# Patient Record
Sex: FEMALE | Race: WHITE | NOT HISPANIC OR LATINO | Employment: OTHER | URBAN - METROPOLITAN AREA
[De-identification: names, ages, dates, MRNs, and addresses within clinical notes are randomized per-mention and may not be internally consistent; named-entity substitution may affect disease eponyms.]

---

## 2017-01-05 ENCOUNTER — TRANSFERRED RECORDS (OUTPATIENT)
Dept: HEALTH INFORMATION MANAGEMENT | Facility: CLINIC | Age: 62
End: 2017-01-05
Payer: COMMERCIAL

## 2017-10-16 LAB — PAP-ABSTRACT: NORMAL

## 2017-10-17 ENCOUNTER — TELEPHONE (OUTPATIENT)
Dept: PEDIATRICS | Facility: CLINIC | Age: 62
End: 2017-10-17

## 2018-06-19 ENCOUNTER — TRANSFERRED RECORDS (OUTPATIENT)
Dept: HEALTH INFORMATION MANAGEMENT | Facility: CLINIC | Age: 63
End: 2018-06-19

## 2018-11-28 ENCOUNTER — TRANSFERRED RECORDS (OUTPATIENT)
Dept: HEALTH INFORMATION MANAGEMENT | Facility: CLINIC | Age: 63
End: 2018-11-28

## 2018-12-04 ENCOUNTER — HEALTH MAINTENANCE LETTER (OUTPATIENT)
Age: 63
End: 2018-12-04

## 2019-04-19 ENCOUNTER — TRANSFERRED RECORDS (OUTPATIENT)
Dept: HEALTH INFORMATION MANAGEMENT | Facility: CLINIC | Age: 64
End: 2019-04-19

## 2019-07-16 ENCOUNTER — TRANSFERRED RECORDS (OUTPATIENT)
Dept: HEALTH INFORMATION MANAGEMENT | Facility: CLINIC | Age: 64
End: 2019-07-16

## 2019-08-01 ENCOUNTER — TRANSFERRED RECORDS (OUTPATIENT)
Dept: HEALTH INFORMATION MANAGEMENT | Facility: CLINIC | Age: 64
End: 2019-08-01

## 2019-08-01 LAB — COLOGUARD-ABSTRACT: NEGATIVE

## 2019-08-06 ENCOUNTER — TRANSFERRED RECORDS (OUTPATIENT)
Dept: HEALTH INFORMATION MANAGEMENT | Facility: CLINIC | Age: 64
End: 2019-08-06
Payer: COMMERCIAL

## 2020-12-10 ENCOUNTER — TRANSFERRED RECORDS (OUTPATIENT)
Dept: HEALTH INFORMATION MANAGEMENT | Facility: CLINIC | Age: 65
End: 2020-12-10

## 2021-03-25 ENCOUNTER — TRANSFERRED RECORDS (OUTPATIENT)
Dept: HEALTH INFORMATION MANAGEMENT | Facility: CLINIC | Age: 66
End: 2021-03-25
Payer: COMMERCIAL

## 2021-07-22 DIAGNOSIS — H40.003 GLAUCOMA SUSPECT OF BOTH EYES: ICD-10-CM

## 2021-07-22 DIAGNOSIS — H40.009 GLAUCOMA SUSPECT, UNSPECIFIED LATERALITY: Primary | ICD-10-CM

## 2021-07-26 ENCOUNTER — OFFICE VISIT (OUTPATIENT)
Dept: OPHTHALMOLOGY | Facility: CLINIC | Age: 66
End: 2021-07-26
Attending: OPHTHALMOLOGY
Payer: MEDICARE

## 2021-07-26 DIAGNOSIS — H52.13 HIGH MYOPIA, BILATERAL: Primary | ICD-10-CM

## 2021-07-26 DIAGNOSIS — H40.003 GLAUCOMA SUSPECT OF BOTH EYES: ICD-10-CM

## 2021-07-26 PROCEDURE — 92134 CPTRZ OPH DX IMG PST SGM RTA: CPT | Performed by: OPHTHALMOLOGY

## 2021-07-26 PROCEDURE — G0463 HOSPITAL OUTPT CLINIC VISIT: HCPCS

## 2021-07-26 PROCEDURE — 92083 EXTENDED VISUAL FIELD XM: CPT | Performed by: OPHTHALMOLOGY

## 2021-07-26 PROCEDURE — 92133 CPTRZD OPH DX IMG PST SGM ON: CPT | Performed by: OPHTHALMOLOGY

## 2021-07-26 PROCEDURE — 99204 OFFICE O/P NEW MOD 45 MIN: CPT | Performed by: OPHTHALMOLOGY

## 2021-07-26 PROCEDURE — 99207 OCT RETINA SPECTRALIS OU (BOTH EYE): CPT | Mod: 26 | Performed by: OPHTHALMOLOGY

## 2021-07-26 RX ORDER — DORZOLAMIDE HCL 20 MG/ML
1 SOLUTION/ DROPS OPHTHALMIC 2 TIMES DAILY
COMMUNITY
End: 2022-01-12 | Stop reason: ALTCHOICE

## 2021-07-26 RX ORDER — BRIMONIDINE TARTRATE 1 MG/ML
1 SOLUTION/ DROPS OPHTHALMIC 2 TIMES DAILY
COMMUNITY
End: 2021-11-02

## 2021-07-26 ASSESSMENT — CONF VISUAL FIELD
OS_INFERIOR_NASAL_RESTRICTION: 3
OS_SUPERIOR_TEMPORAL_RESTRICTION: 3
OS_INFERIOR_TEMPORAL_RESTRICTION: 3

## 2021-07-26 ASSESSMENT — TONOMETRY
OS_IOP_MMHG: 19
OD_IOP_MMHG: 17
IOP_METHOD: TONOPEN

## 2021-07-26 ASSESSMENT — VISUAL ACUITY
OS_CC: 20/25
CORRECTION_TYPE: GLASSES
METHOD: SNELLEN - LINEAR
OS_CC+: -2
OD_CC: 20/20

## 2021-07-26 ASSESSMENT — REFRACTION_WEARINGRX
OD_AXIS: 021
SPECS_TYPE: SVL
OS_CYLINDER: +1.25
OS_SPHERE: -6.25
OD_SPHERE: -5.00
OD_ADD: +2.50
OD_CYLINDER: +1.00
OS_ADD: +2.50
OS_AXIS: 151

## 2021-07-26 ASSESSMENT — SLIT LAMP EXAM - LIDS
COMMENTS: NORMAL
COMMENTS: NORMAL

## 2021-07-26 ASSESSMENT — EXTERNAL EXAM - LEFT EYE: OS_EXAM: NORMAL

## 2021-07-26 ASSESSMENT — EXTERNAL EXAM - RIGHT EYE: OD_EXAM: NORMAL

## 2021-07-26 NOTE — NURSING NOTE
Chief Complaints and History of Present Illnesses   Patient presents with     Glaucoma     Chief Complaint(s) and History of Present Illness(es)     Glaucoma     Laterality: both eyes              Comments     Pt. States that she has been treated for Glaucoma for the last 5 years. Transferring care after move from [place]. No longer drives due to blurry vision. Does have myopic degeneration. No flashes or floaters BE.   Joana Rivers COT 10:24 AM July 26, 2021

## 2021-07-26 NOTE — PROGRESS NOTES
CC: retina and glaucoma consult  HPI: Emperatriz Esteban is a  66 year old year-old patient with history of glaucoma and cataract surgery.  Moved from [place] 2.5 months ago and she is here to establish eye care. Reports chronic progressive decreased vision both eyes. Quite driving in may because of difficulties driving at night. Also resport chronic poor night vision. 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 History of injections x 3 2016 and 2017      Retinal Imaging:  Macula Optical Coherence Tomography 07/26/21   Right eye: Epiretinal membrane with pseudohole  Left eye: thin retina with mild irregularity of the retina surface    ONFL 07/26/21   RE: inf thinning   LE: inferior thinning left eye worse than right eye     OVF 24-2   Right eye: non reliable; scatter spots of decreased sensitivity. MD -1.8  Left eye: sup arcuate MD -12.7    Assessment & Plan:  # history of myopic degeneration  Shallow posterior staphylomas both eyes  No heme or subretinal fluid  Retina detachment precautions were discussed with the patient (presence or increased in flashes, floaters or a curtain in the visual field) and was asked to return if any of the those occur     # Primary open angle glaucoma (POAG) both eyes    Advance left eye. ONFL correlated thinning with arcuate visual field defect    Gonio: needs next follow up   K pachy: needs next follow up    Asthma/COPD: No  Steroid Use: No    Kidney Stones: No    Sulfa Allergy:      No   Tmax: unknown    # history of Dry eye syndrome   - uses serum eyedrops. Currently uses it on and off.  - eyes still significantly dry with punctate epithelial erosions.  - Patient states serum  tears has worked well for her. Serum tears Form filled out today [facility]  - per patient couldn't afford restasis  - PFAT as needed   - warm compresses, lid hygiene, humidification and fish oil recommended    # pseudophakic both eyes   Stable    PLAN  - I counseled patient about the importance of vigilant adherence to regular follow up and any future treatment plan to reduce the risk of vision loss and irreversible blindness that is undetectable subjectively until very late in advanced glaucoma.    - recommend to continue cosopt twice a day both eyes and brimonidine twice a day left eye   - consider repeating the OVF with G TOP in the future  - recommend to follow up in 3 months with optos, autofluorescence, Optical Coherence Tomography, gonio and pachy   - PFAT as needed   - serum tears four times a day  Both eyes        ~~~~~~~~~~~~~~~~~~~~~~~~~~~~~~~~~~   Complete documentation of historical and exam elements from today's encounter can be found in the full encounter summary report (not reduplicated in this progress note).  I personally obtained the chief complaint(s) and history of present illness.  I confirmed and edited as necessary the review of systems, past medical/surgical history, family history, social history, and examination findings as documented by others; and I examined the patient myself.  I personally reviewed the relevant tests, images, and reports as documented above.  I formulated and edited as necessary the assessment and plan and discussed the findings and management plan with the patient and family    Cydney Montes MD   of Ophthalmology.  Retina Service   Department of Ophthalmology and Visual Neurosciences   [facility]  Phone: [phone]   Fax: [phone]

## 2021-08-13 ENCOUNTER — OFFICE VISIT (OUTPATIENT)
Dept: FAMILY MEDICINE | Facility: CLINIC | Age: 66
End: 2021-08-13
Payer: MEDICARE

## 2021-08-13 VITALS
HEIGHT: 66 IN | WEIGHT: 153 LBS | TEMPERATURE: 98.7 F | OXYGEN SATURATION: 98 % | HEART RATE: 59 BPM | DIASTOLIC BLOOD PRESSURE: 72 MMHG | BODY MASS INDEX: 24.59 KG/M2 | SYSTOLIC BLOOD PRESSURE: 112 MMHG | RESPIRATION RATE: 16 BRPM

## 2021-08-13 DIAGNOSIS — K21.9 GASTROESOPHAGEAL REFLUX DISEASE WITHOUT ESOPHAGITIS: ICD-10-CM

## 2021-08-13 DIAGNOSIS — F31.9 BIPOLAR AFFECTIVE DISORDER, REMISSION STATUS UNSPECIFIED (H): Primary | ICD-10-CM

## 2021-08-13 PROCEDURE — 99203 OFFICE O/P NEW LOW 30 MIN: CPT | Performed by: FAMILY MEDICINE

## 2021-08-13 RX ORDER — ARIPIPRAZOLE 5 MG/1
1 TABLET ORAL DAILY
COMMUNITY
Start: 2021-06-30 | End: 2021-09-21

## 2021-08-13 RX ORDER — OMEPRAZOLE 40 MG/1
40 CAPSULE, DELAYED RELEASE ORAL DAILY
Qty: 90 CAPSULE | Refills: 3 | Status: SHIPPED | OUTPATIENT
Start: 2021-08-13 | End: 2022-07-11

## 2021-08-13 ASSESSMENT — MIFFLIN-ST. JEOR: SCORE: 1245.75

## 2021-08-30 ENCOUNTER — MYC MEDICAL ADVICE (OUTPATIENT)
Dept: FAMILY MEDICINE | Facility: CLINIC | Age: 66
End: 2021-08-30

## 2021-09-02 NOTE — TELEPHONE ENCOUNTER
Dr. Wheat/ Provider-  1. Writer sees letter sent to patient (dated 8/24/21) but it does not seem patient fits current category for booster vaccination.  Please advise what to inform patient.    Thank you!  ARLENE GambinoN, RN  [facility]

## 2021-09-07 ENCOUNTER — DOCUMENTATION ONLY (OUTPATIENT)
Dept: FAMILY MEDICINE | Facility: CLINIC | Age: 66
End: 2021-09-07

## 2021-09-07 NOTE — TELEPHONE ENCOUNTER
Letter sent by Renzo Mayo, who appears to be an Epic IT person. I'm not sure why she was contacted.    Right now booster vaccines are being recommended for the following groups of people usually 8 months after their last covid vaccine, and she doesn't qualify.    Receiving active cancer treatment for tumors or cancers of the blood  Received an organ transplant and are taking medicine to suppress the immune system  Received a stem cell transplant within the last 2 years or are taking medicine to suppress the immune system  Moderate or severe primary immunodeficiency (such as DiGeorge syndrome, Wiskott-Nubieber syndrome)  Advanced or untreated HIV infection  Active treatment with high-dose corticosteroids or other drugs that may suppress your immune response    Sincerely,  Dr. Nayana Wheat MD  9/7/2021

## 2021-09-14 ENCOUNTER — MYC MEDICAL ADVICE (OUTPATIENT)
Dept: FAMILY MEDICINE | Facility: CLINIC | Age: 66
End: 2021-09-14

## 2021-09-14 DIAGNOSIS — F31.9 BIPOLAR AFFECTIVE DISORDER, REMISSION STATUS UNSPECIFIED (H): Primary | ICD-10-CM

## 2021-09-17 NOTE — TELEPHONE ENCOUNTER
ASSESSMENT / PLAN:  (F31.9) Bipolar affective disorder, remission status unspecified (H)  (primary encounter diagnosis)  Comment:   Plan: MENTAL HEALTH REFERRAL  - Adult; Psychiatry;         Psychiatry; Collaborative Care Psychiatry         Service/Bridge to Long-Term Psychiatry as         indicated ([phone]); Yes; Diagnostic         clarification; Yes; We will contact you to         schedule the appointment or ple...          HI, I'm happy to refer you for collaborative care, which might be a good bridge, but you do need to be seen by a psychiatrist, as I don't routinely prescribe or monitor Lamictal and Abilify, and I don't treat or manage bipolar disorders. Please see if your psychiatrist in [place] could refill medications until you can be seen here.    Sincerely,  Dr. Nayana Wheat MD  9/16/2021

## 2021-09-18 ENCOUNTER — MYC MEDICAL ADVICE (OUTPATIENT)
Dept: FAMILY MEDICINE | Facility: CLINIC | Age: 66
End: 2021-09-18

## 2021-09-18 DIAGNOSIS — F31.9 BIPOLAR AFFECTIVE DISORDER, REMISSION STATUS UNSPECIFIED (H): Primary | ICD-10-CM

## 2021-09-20 NOTE — TELEPHONE ENCOUNTER
Routing to Dr Wheat to see if she is willing to Assume prescribing.  Meds are currently listed as patient reported  Angella Bahena RN  [facility]

## 2021-09-20 NOTE — TELEPHONE ENCOUNTER
Patient called to inquire about the status of her request below. Is wondering if Dr. Wheat will refill her -  lamoTRIgine (LAMICTAL) 25 MG tablet and ARIPiprazole (ABILIFY) 5 MG tablet.    States she's never prescribed them for her before and she will be out in about 15 days which is sooner than her scheduled visit on 12/16/2021.    Please assist. Thanks!

## 2021-09-21 ENCOUNTER — MYC MEDICAL ADVICE (OUTPATIENT)
Dept: FAMILY MEDICINE | Facility: CLINIC | Age: 66
End: 2021-09-21

## 2021-09-21 RX ORDER — ARIPIPRAZOLE 5 MG/1
5 TABLET ORAL DAILY
Qty: 60 TABLET | Refills: 0 | Status: SHIPPED | OUTPATIENT
Start: 2021-09-21 | End: 2021-11-15

## 2021-09-21 RX ORDER — DORZOLAMIDE HYDROCHLORIDE AND TIMOLOL MALEATE 20; 5 MG/ML; MG/ML
SOLUTION/ DROPS OPHTHALMIC
COMMUNITY
Start: 2021-08-03 | End: 2021-11-02

## 2021-09-21 RX ORDER — LAMOTRIGINE 25 MG/1
125 TABLET ORAL DAILY
Qty: 300 TABLET | Refills: 0 | Status: SHIPPED | OUTPATIENT
Start: 2021-09-21 | End: 2021-12-16 | Stop reason: DRUGHIGH

## 2021-09-21 RX ORDER — BRIMONIDINE TARTRATE 2 MG/ML
SOLUTION/ DROPS OPHTHALMIC
COMMUNITY
Start: 2021-06-30 | End: 2022-01-12

## 2021-09-21 NOTE — TELEPHONE ENCOUNTER
She has psychiatry appointment on 11/15/2021. I will refill lamictal and abilify for 60 days. Also, there is a record of two different doses of Lamictal, 125 vs 150. I will prescribe 125 mg. Please let her know she can  the prescriptions at the [facility].  Sincerely,  Dr. Nayana Wheat MD  9/21/2021      Up to date:  Lamotrigine - Using lamotrigine monotherapy to treat acute bipolar I major depression is advantageous in that the drug is generally well tolerated and has demonstrated efficacy for maintenance treatment [9,45]. However, a disadvantage in using lamotrigine as an acute treatment is that its efficacy is modest and clinicians need to titrate the dose slowly to reduce the risk of life-threatening skin rashes.     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 Also, cardiac arrhythmia may be a concern.    ASSESSMENT / PLAN:  (F31.9) Bipolar affective disorder, remission status unspecified (H)  (primary encounter diagnosis)  Comment:   Plan: ARIPiprazole (ABILIFY) 5 MG tablet, lamoTRIgine        (LAMICTAL) 25 MG tablet

## 2021-09-21 NOTE — TELEPHONE ENCOUNTER
Writer responded via [employer].    ARLENE GambinoN, RN  [facility]

## 2021-10-09 ENCOUNTER — HEALTH MAINTENANCE LETTER (OUTPATIENT)
Age: 66
End: 2021-10-09

## 2021-10-20 DIAGNOSIS — H35.371 EPIRETINAL MEMBRANE (ERM) OF RIGHT EYE: ICD-10-CM

## 2021-10-20 DIAGNOSIS — H40.009 GLAUCOMA SUSPECT, UNSPECIFIED LATERALITY: Primary | ICD-10-CM

## 2021-10-28 ENCOUNTER — MYC MEDICAL ADVICE (OUTPATIENT)
Dept: FAMILY MEDICINE | Facility: CLINIC | Age: 66
End: 2021-10-28

## 2021-10-28 DIAGNOSIS — F13.939 WITHDRAWAL FROM SEDATIVE, HYPNOTIC, OR ANXIOLYTIC DRUG (H): ICD-10-CM

## 2021-10-28 DIAGNOSIS — G47.09 OTHER INSOMNIA: ICD-10-CM

## 2021-10-29 RX ORDER — TRAZODONE HYDROCHLORIDE 100 MG/1
100 TABLET ORAL
Qty: 90 TABLET | Refills: 1 | Status: SHIPPED | OUTPATIENT
Start: 2021-10-29 | End: 2021-11-15

## 2021-10-29 NOTE — TELEPHONE ENCOUNTER
ASSESSMENT / PLAN:  (G47.09) Other insomnia  Comment:   Plan: traZODone (DESYREL) 100 MG tablet            (F13.239) Withdrawal from sedative, hypnotic, or anxiolytic drug (H)  Comment:   Plan: traZODone (DESYREL) 100 MG tablet          Refill sent to [facility] today. She has appointment scheduled with me for December.  Sincerely,  Dr. Nayana Wheat MD  10/29/2021

## 2021-10-29 NOTE — TELEPHONE ENCOUNTER
Routing refill request to provider for review/approval because:  A break in medication    Last filled: 9/1/2016 #90 x 1 refill    Last visit: 8/13/2021    thank you

## 2021-11-02 DIAGNOSIS — H40.003 GLAUCOMA SUSPECT OF BOTH EYES: Primary | ICD-10-CM

## 2021-11-02 RX ORDER — DORZOLAMIDE HYDROCHLORIDE AND TIMOLOL MALEATE 20; 5 MG/ML; MG/ML
1 SOLUTION/ DROPS OPHTHALMIC 2 TIMES DAILY
Qty: 10 ML | Refills: 1 | Status: SHIPPED | OUTPATIENT
Start: 2021-11-02 | End: 2022-02-10

## 2021-11-02 RX ORDER — BRIMONIDINE TARTRATE 1 MG/ML
1 SOLUTION/ DROPS OPHTHALMIC 2 TIMES DAILY
Qty: 5 ML | Refills: 3 | Status: SHIPPED | OUTPATIENT
Start: 2021-11-02 | End: 2022-01-12

## 2021-11-02 NOTE — PROGRESS NOTES
Refilled patient's prescriptions per Dr. Montes note:  -Cosopt BID each eye   -Brimonidine BID left eye     Salma Renee MD  Ophthalmology Resident, PGY-3

## 2021-11-12 ENCOUNTER — MEDICAL CORRESPONDENCE (OUTPATIENT)
Dept: HEALTH INFORMATION MANAGEMENT | Facility: CLINIC | Age: 66
End: 2021-11-12
Payer: MEDICARE

## 2021-11-15 ENCOUNTER — VIRTUAL VISIT (OUTPATIENT)
Dept: PSYCHIATRY | Facility: CLINIC | Age: 66
End: 2021-11-15
Attending: FAMILY MEDICINE
Payer: MEDICARE

## 2021-11-15 DIAGNOSIS — F51.05 INSOMNIA DUE TO OTHER MENTAL DISORDER: ICD-10-CM

## 2021-11-15 DIAGNOSIS — F99 INSOMNIA DUE TO OTHER MENTAL DISORDER: ICD-10-CM

## 2021-11-15 DIAGNOSIS — F31.31 BIPOLAR AFFECTIVE DISORDER, CURRENTLY DEPRESSED, MILD (H): Primary | ICD-10-CM

## 2021-11-15 PROCEDURE — 99204 OFFICE O/P NEW MOD 45 MIN: CPT | Mod: 95 | Performed by: NURSE PRACTITIONER

## 2021-11-15 RX ORDER — TRAZODONE HYDROCHLORIDE 100 MG/1
200 TABLET ORAL
Qty: 90 TABLET | Refills: 1 | Status: SHIPPED | OUTPATIENT
Start: 2021-11-15 | End: 2021-12-16

## 2021-11-15 RX ORDER — LAMOTRIGINE 150 MG/1
300 TABLET ORAL DAILY
Qty: 180 TABLET | Refills: 0 | Status: SHIPPED | OUTPATIENT
Start: 2021-11-15 | End: 2022-02-10

## 2021-11-15 RX ORDER — ARIPIPRAZOLE 5 MG/1
5 TABLET ORAL DAILY
Qty: 90 TABLET | Refills: 1 | Status: SHIPPED | OUTPATIENT
Start: 2021-11-15 | End: 2022-01-19 | Stop reason: ALTCHOICE
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
## 2021-11-15 NOTE — PATIENT INSTRUCTIONS
1.  Continue Abilify 5 mg at bedtime-consider decreasing the dose in the future  2.  Continue lamotrigine 300 mg at bedtime  3.  Change trazodone dose to 100 mg at bedtime with 50 to 100 mg later at night if you wake up  4.  Continue talk therapies virtually  5.  Routine lab work to be ordered by your primary doctor-include fasting glucose, lipid panel, kidney and liver function profiles        Continue all other medical directions per primary care provider.     Continue all other medications as reviewed per electronic medical record today.     Safety plan reviewed. To the Emergency Department as needed or call after hours crisis line at [phone] or [phone]. [place] Crisis Text Line: Text [place] to 196643  or  Suicide LifeLine Chat: FastCAP.org/chat/    To schedule individual or family therapy, call [facility] at [phone].     Schedule an appointment with me in 3 months or sooner as needed.  Call [facility] at [phone] to schedule.    Follow up with primary care provider as planned or for acute medical concerns.    Call the psychiatric nurse line with medication questions or concerns at [phone].    GotVoicehart may be used to communicate with your provider, but this is not intended to be used for emergencies.    Crisis Resources:    National Suicide Prevention Lifeline: 155.622.6530 (TTY: 999.343.5980). Call anytime for help.  (www.suicidepreventionlifeline.org)  National [place] on Mental Illness (www.rey.org): 971.182.7565 or 704-495-8149.   Mental Health Association (www.mentalhealth.org): 125.163.7583 or 983-616-9033.  [place] Crisis Text Line: Text [place] to 313281  Suicide LifeLine Chat: FastCAP.org/chat

## 2021-11-15 NOTE — Clinical Note
Hello, I spoke with Eulalia today and she desires no changes in her medications at this point.  She is interested in decreasing doses and we talked about possibly tapering her off of the Abilify in the future.  Her trazodone has been changed to take 1 tablet at bedtime with another 1/2 to 1 tablet later at night if she wakes up.  Eulalia is engaged in weekly therapy sessions.  I recommended routine lab work at her next visit with you to include kidney liver function, lipid panel, fasting glucose, possibly vitamin D level.  Thank you for the referral.  Malia

## 2021-11-22 ENCOUNTER — OFFICE VISIT (OUTPATIENT)
Dept: OPHTHALMOLOGY | Facility: CLINIC | Age: 66
End: 2021-11-22
Attending: OPHTHALMOLOGY
Payer: MEDICARE

## 2021-11-22 DIAGNOSIS — H40.1190 PRIMARY OPEN ANGLE GLAUCOMA: Primary | ICD-10-CM

## 2021-11-22 DIAGNOSIS — H35.371 EPIRETINAL MEMBRANE (ERM) OF RIGHT EYE: ICD-10-CM

## 2021-11-22 PROCEDURE — 92012 INTRM OPH EXAM EST PATIENT: CPT | Performed by: OPHTHALMOLOGY

## 2021-11-22 PROCEDURE — 76514 ECHO EXAM OF EYE THICKNESS: CPT | Performed by: OPHTHALMOLOGY

## 2021-11-22 PROCEDURE — 92134 CPTRZ OPH DX IMG PST SGM RTA: CPT | Performed by: OPHTHALMOLOGY

## 2021-11-22 PROCEDURE — G0463 HOSPITAL OUTPT CLINIC VISIT: HCPCS | Mod: 25

## 2021-11-22 ASSESSMENT — REFRACTION_WEARINGRX
OD_SPHERE: -5.00
OD_AXIS: 021
OS_AXIS: 151
OD_CYLINDER: +1.00
OS_SPHERE: -6.25
OD_ADD: +2.50
OS_ADD: +2.50
OS_CYLINDER: +1.25
SPECS_TYPE: SVL

## 2021-11-22 ASSESSMENT — GONIOSCOPY
OD_NASAL: OPEN
OS_TEMPORAL: OPEN
OD_INFERIOR: OPEN
OS_SUPERIOR: OPEN
OD_TEMPORAL: OPEN
OD_SUPERIOR: OPEN
OS_NASAL: OPEN
OS_INFERIOR: OPEN

## 2021-11-22 ASSESSMENT — PACHYMETRY
OD_CT(UM): 555
OS_CT(UM): 560

## 2021-11-22 ASSESSMENT — VISUAL ACUITY
OS_CC+: -
CORRECTION_TYPE: GLASSES
OD_CC: 20/25
OS_CC: 20/30
OD_CC+: -2
METHOD: SNELLEN - LINEAR

## 2021-11-22 ASSESSMENT — EXTERNAL EXAM - RIGHT EYE: OD_EXAM: NORMAL

## 2021-11-22 ASSESSMENT — TONOMETRY
OS_IOP_MMHG: 17
OD_IOP_MMHG: 17
IOP_METHOD: APPLANATION

## 2021-11-22 ASSESSMENT — SLIT LAMP EXAM - LIDS
COMMENTS: NORMAL
COMMENTS: NORMAL

## 2021-11-22 ASSESSMENT — CONF VISUAL FIELD
METHOD: COUNTING FINGERS
OS_NORMAL: 1
OD_NORMAL: 1

## 2021-11-22 ASSESSMENT — EXTERNAL EXAM - LEFT EYE: OS_EXAM: NORMAL

## 2021-11-22 NOTE — PROGRESS NOTES
CC: retina and glaucoma consult  HPI: Emperatriz Esteban is a  66 year old year-old patient with history of glaucoma and cataract surgery.  Moved from [place] 2.5 months ago and she is here to establish eye care. Reports chronic progressive decreased vision both eyes. Quite driving in may because of difficulties driving at night. Also resport chronic poor night vision. 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 History of injections x 3 2016 and 2017      Retinal Imaging:  Macula Optical Coherence Tomography 07/26/21   Right eye: Epiretinal membrane with pseudohole  Left eye: thin retina with mild irregularity of the retina surface    ONFL 07/26/21   RE: inf thinning   LE: inferior thinning left eye worse than right eye     OVF 24-2   Right eye: non reliable; scatter spots of decreased sensitivity. MD -1.8  Left eye: sup arcuate MD -12.7    Assessment & Plan:  # history of myopic degeneration  Shallow posterior staphylomas both eyes  No heme or subretinal fluid  Patient prefers no dilated exam today   Retina detachment precautions were discussed with the patient (presence or increased in flashes, floaters or a curtain in the visual field) and was asked to return if any of the those occur     # Primary open angle glaucoma (POAG) both eyes    Advance left eye. ONFL correlated thinning with arcuate visual field defect    Gonio: open   K pachy:    Thickness 555 560     Asthma/COPD: No  Steroid Use: No    Kidney Stones: No    Sulfa Allergy:      No   Tmax: unknown  Today 17 both eyes     # partial thickness Macula hole right eye   Retina detachment precautions were discussed with the patient (presence or increased in  flashes, floaters or a curtain in the visual field) and was asked to return if any of the those occur   Follow up in approximately 6 months without  Optical Coherence Tomography     # history of Dry eye syndrome   - uses serum eyedrops. Currently uses it on and off.  - today few punctate epithelial erosions.  - Patient states serum tears has worked well for her.   - per patient couldn't afford restasis.  - PFAT as needed   - warm compresses, lid hygiene, humidification and fish oil recommended    # pseudophakic both eyes   Stable    PLAN  - follow up in 1-2 months with ONFL, optos and dilation  - I counseled patient about the importance of vigilant adherence to regular follow up and any future treatment plan to reduce the risk of vision loss and irreversible blindness that is undetectable subjectively until very late in advanced glaucoma.    - recommend to continue cosopt twice a day both eyes and brimonidine twice a day left eye   - recommend to follow up in 3 months with optos, autofluorescence, Optical Coherence Tomography, gonio and pachy   - PFAT as needed   - will alternate glaucoma testing      ~~~~~~~~~~~~~~~~~~~~~~~~~~~~~~~~~~   Complete documentation of historical and exam elements from today's encounter can be found in the full encounter summary report (not reduplicated in this progress note).  I personally obtained the chief complaint(s) and history of present illness.  I confirmed and edited as necessary the review of systems, past medical/surgical history, family history, social history, and examination findings as documented by others; and I examined the patient myself.  I personally reviewed the relevant tests, images, and reports as documented above.  I formulated and edited as necessary the assessment and plan and discussed the findings and management plan with the patient and family    Cydney Montes MD   of Ophthalmology.  Retina Service   Department of Ophthalmology and  Visual Neurosciences   [facility]  Phone: [phone]   Fax: [phone]

## 2021-11-22 NOTE — NURSING NOTE
Chief Complaints and History of Present Illnesses   Patient presents with     Follow Up     Primary open angle glaucoma (POAG) both eyes       Chief Complaint(s) and History of Present Illness(es)     Follow Up     Laterality: both eyes    Course: stable    Associated symptoms: Negative for tearing, eye pain, dryness and redness    Treatments tried: no treatments    Pain scale: 0/10    Comments: Primary open angle glaucoma (POAG) both eyes                Comments     She states that her vision has seemed stable in both eyes since her last eye exam.  Due to expense and inconvenience she has not used serum tears.  She does not use artificial tears either.    She uses:  Brimonidine left eye twice a day  Cosopt each eye twice a day    VINAY Francisco 8:31 AM  November 22, 2021

## 2021-12-16 ENCOUNTER — OFFICE VISIT (OUTPATIENT)
Dept: FAMILY MEDICINE | Facility: CLINIC | Age: 66
End: 2021-12-16
Payer: MEDICARE

## 2021-12-16 VITALS
TEMPERATURE: 98.3 F | SYSTOLIC BLOOD PRESSURE: 134 MMHG | HEIGHT: 66 IN | OXYGEN SATURATION: 97 % | HEART RATE: 68 BPM | RESPIRATION RATE: 16 BRPM | BODY MASS INDEX: 24.91 KG/M2 | WEIGHT: 155 LBS | DIASTOLIC BLOOD PRESSURE: 81 MMHG

## 2021-12-16 DIAGNOSIS — Z51.81 ENCOUNTER FOR THERAPEUTIC DRUG MONITORING: ICD-10-CM

## 2021-12-16 DIAGNOSIS — Z11.59 ENCOUNTER FOR HEPATITIS C SCREENING TEST FOR LOW RISK PATIENT: ICD-10-CM

## 2021-12-16 DIAGNOSIS — Z78.0 POST-MENOPAUSE: ICD-10-CM

## 2021-12-16 DIAGNOSIS — Z00.00 ENCOUNTER FOR MEDICARE ANNUAL WELLNESS EXAM: Primary | ICD-10-CM

## 2021-12-16 DIAGNOSIS — Z12.31 ENCOUNTER FOR SCREENING MAMMOGRAM FOR BREAST CANCER: ICD-10-CM

## 2021-12-16 DIAGNOSIS — E03.8 OTHER SPECIFIED HYPOTHYROIDISM: ICD-10-CM

## 2021-12-16 DIAGNOSIS — R73.01 IMPAIRED FASTING GLUCOSE: ICD-10-CM

## 2021-12-16 DIAGNOSIS — G47.09 OTHER INSOMNIA: ICD-10-CM

## 2021-12-16 DIAGNOSIS — Z13.6 CARDIOVASCULAR SCREENING; LDL GOAL LESS THAN 160: ICD-10-CM

## 2021-12-16 DIAGNOSIS — R68.2 DRY MOUTH: ICD-10-CM

## 2021-12-16 LAB
ALBUMIN SERPL-MCNC: 3.5 G/DL (ref 3.4–5)
ALP SERPL-CCNC: 59 U/L (ref 40–150)
ALT SERPL W P-5'-P-CCNC: 32 U/L (ref 0–50)
ANION GAP SERPL CALCULATED.3IONS-SCNC: 6 MMOL/L (ref 3–14)
AST SERPL W P-5'-P-CCNC: 21 U/L (ref 0–45)
BASOPHILS # BLD AUTO: 0 10E3/UL (ref 0–0.2)
BASOPHILS NFR BLD AUTO: 1 %
BILIRUB SERPL-MCNC: 0.5 MG/DL (ref 0.2–1.3)
BUN SERPL-MCNC: 15 MG/DL (ref 7–30)
CALCIUM SERPL-MCNC: 9.4 MG/DL (ref 8.5–10.1)
CHLORIDE BLD-SCNC: 106 MMOL/L (ref 94–109)
CO2 SERPL-SCNC: 27 MMOL/L (ref 20–32)
CREAT SERPL-MCNC: 0.84 MG/DL (ref 0.52–1.04)
EOSINOPHIL # BLD AUTO: 0.1 10E3/UL (ref 0–0.7)
EOSINOPHIL NFR BLD AUTO: 3 %
ERYTHROCYTE [DISTWIDTH] IN BLOOD BY AUTOMATED COUNT: 13.8 % (ref 10–15)
FASTING STATUS PATIENT QL REPORTED: NO
FOLATE SERPL-MCNC: 20.1 NG/ML
GFR SERPL CREATININE-BSD FRML MDRD: 72 ML/MIN/1.73M2
GLUCOSE BLD-MCNC: 95 MG/DL (ref 70–99)
GLUCOSE BLD-MCNC: 95 MG/DL (ref 70–99)
HCT VFR BLD AUTO: 42.7 % (ref 35–47)
HGB BLD-MCNC: 13.9 G/DL (ref 11.7–15.7)
IMM GRANULOCYTES # BLD: 0 10E3/UL
IMM GRANULOCYTES NFR BLD: 0 %
LYMPHOCYTES # BLD AUTO: 1.4 10E3/UL (ref 0.8–5.3)
LYMPHOCYTES NFR BLD AUTO: 28 %
MAGNESIUM SERPL-MCNC: 2.6 MG/DL (ref 1.6–2.3)
MCH RBC QN AUTO: 31.6 PG (ref 26.5–33)
MCHC RBC AUTO-ENTMCNC: 32.6 G/DL (ref 31.5–36.5)
MCV RBC AUTO: 97 FL (ref 78–100)
MONOCYTES # BLD AUTO: 0.4 10E3/UL (ref 0–1.3)
MONOCYTES NFR BLD AUTO: 9 %
NEUTROPHILS # BLD AUTO: 3 10E3/UL (ref 1.6–8.3)
NEUTROPHILS NFR BLD AUTO: 61 %
PLATELET # BLD AUTO: 273 10E3/UL (ref 150–450)
POTASSIUM BLD-SCNC: 4.1 MMOL/L (ref 3.4–5.3)
PROT SERPL-MCNC: 7.1 G/DL (ref 6.8–8.8)
RBC # BLD AUTO: 4.4 10E6/UL (ref 3.8–5.2)
SODIUM SERPL-SCNC: 139 MMOL/L (ref 133–144)
TSH SERPL DL<=0.005 MIU/L-ACNC: 2.14 MU/L (ref 0.4–4)
VIT B12 SERPL-MCNC: 769 PG/ML (ref 193–986)
WBC # BLD AUTO: 5 10E3/UL (ref 4–11)

## 2021-12-16 PROCEDURE — 36415 COLL VENOUS BLD VENIPUNCTURE: CPT | Performed by: FAMILY MEDICINE

## 2021-12-16 PROCEDURE — G0439 PPPS, SUBSEQ VISIT: HCPCS | Performed by: FAMILY MEDICINE

## 2021-12-16 PROCEDURE — 82607 VITAMIN B-12: CPT | Performed by: FAMILY MEDICINE

## 2021-12-16 PROCEDURE — 85025 COMPLETE CBC W/AUTO DIFF WBC: CPT | Performed by: FAMILY MEDICINE

## 2021-12-16 PROCEDURE — 84443 ASSAY THYROID STIM HORMONE: CPT | Performed by: FAMILY MEDICINE

## 2021-12-16 PROCEDURE — 82746 ASSAY OF FOLIC ACID SERUM: CPT | Performed by: FAMILY MEDICINE

## 2021-12-16 PROCEDURE — 80053 COMPREHEN METABOLIC PANEL: CPT | Performed by: FAMILY MEDICINE

## 2021-12-16 PROCEDURE — 86803 HEPATITIS C AB TEST: CPT | Performed by: FAMILY MEDICINE

## 2021-12-16 PROCEDURE — 99214 OFFICE O/P EST MOD 30 MIN: CPT | Mod: 25 | Performed by: FAMILY MEDICINE

## 2021-12-16 PROCEDURE — 83735 ASSAY OF MAGNESIUM: CPT | Performed by: FAMILY MEDICINE

## 2021-12-16 PROCEDURE — 80061 LIPID PANEL: CPT | Performed by: FAMILY MEDICINE

## 2021-12-16 PROCEDURE — 86235 NUCLEAR ANTIGEN ANTIBODY: CPT | Performed by: FAMILY MEDICINE

## 2021-12-16 RX ORDER — SORBITOL SOLUTION 70 %
60 SOLUTION, ORAL MISCELLANEOUS 2 TIMES DAILY PRN
Qty: 472 ML | Refills: 3 | Status: SHIPPED | OUTPATIENT
Start: 2021-12-16 | End: 2022-03-04

## 2021-12-16 RX ORDER — TRAZODONE HYDROCHLORIDE 100 MG/1
100 TABLET ORAL
Qty: 90 TABLET | Refills: 3 | Status: SHIPPED | OUTPATIENT
Start: 2021-12-16 | End: 2022-02-14

## 2021-12-16 ASSESSMENT — ENCOUNTER SYMPTOMS
DIARRHEA: 0
DIZZINESS: 0
DYSURIA: 0
PARESTHESIAS: 0
ARTHRALGIAS: 0
NAUSEA: 0
SHORTNESS OF BREATH: 0
HEADACHES: 0
COUGH: 0
HEMATURIA: 0
JOINT SWELLING: 0
ABDOMINAL PAIN: 0
CHILLS: 0
SORE THROAT: 0
WEAKNESS: 0
EYE PAIN: 0
HEMATOCHEZIA: 0
FREQUENCY: 0
PALPITATIONS: 0
NERVOUS/ANXIOUS: 1
BREAST MASS: 0
HEARTBURN: 1
FEVER: 0
CONSTIPATION: 0
MYALGIAS: 0

## 2021-12-16 ASSESSMENT — MIFFLIN-ST. JEOR: SCORE: 1254.83

## 2021-12-16 ASSESSMENT — ACTIVITIES OF DAILY LIVING (ADL): CURRENT_FUNCTION: NO ASSISTANCE NEEDED

## 2021-12-16 NOTE — PATIENT INSTRUCTIONS
"    DEXA scan due  Please call [facility] Radiology Department to schedule an outpatient appointment at [phone] for your DEXA scan. You can do this the same day as your  Mammogram.    Shingles vaccine  I strongly recommend getting the shingles vaccine (Shingrix) if you are over age 50, but please check with your insurance to see how much you might have to pay for this vaccine! If you are on most insurance plans including Medicare, it's covered if you get it at a pharmacy but not in a clinic.    This shot will prevent shingles, a painful skin rash that you are at risk for getting if you have ever had chicken pox. Sometimes the pain will last the rest of your life, even after the rash has healed, and there is not much that we can do to relieve the pain. The vaccine is 98% effective at preventing shingles.    Please consider getting this vaccine! You'll need #2 vaccines 2-4 months apart to be protected.         Patient Education   Coping with Dry Mouth and Thick Saliva  What happens when you have dry mouth and thick saliva?  If you have a dry mouth or thick saliva, it affects simple activities like speaking and swallowing. It can also cause taste changes.  Dry mouth can last long after your treatment has ended. Symptoms may be worse at night and when you get up in the morning.  How are they treated?  Ask your care team about \"artificial saliva\" (for example, Biotene). This is a medicine that coats, protects and moistens your mouth and throat. You can buy this at the [employer].   What else can I do to treat or prevent dry mouth and thick saliva?    Sip water every few minutes to help yourself swallow and talk. Keep a water bottle with you at all times.    Use a straw to make drinking easier.    Suck on hard candy, frozen grapes, frozen watermelon, Popsicles or ice chips.    Chew sugar-free gum.    Moisten foods with broth, soup, sauce, gravy, sour cream, milk, butter or margarine.    Eat cool foods (yogurt, ice " cream, ice chips, sherbet, Popsicles) to help soothe your mouth.    Try very sweet or tart foods and drinks, such as lemonade. These foods may help your mouth make more saliva. Do not try this if you have mouth sores. It could make your mouth hurt more.    Choose foods that are moist and easy to chew (see the following food list). Take small bites and chew food well.    Avoid:  ? Hot foods and drinks  ? Caffeine and alcohol (these may dry out your mouth)  ? Foods that irritate the mouth (such as high-acid foods like tomatoes, or coarse, dry foods like raw vegetables, granola, crackers and toast)  ? Store-bought mouthwash, since the alcohol it contains can dry out your mouth.    If you wear dentures, be sure they fit you well.    Do not smoke.    Use a humidifier to moisten the air at home, especially at night.    Clean your teeth with a soft toothbrush.    Rinse your mouth with water before and after meals. Or use a mild mouth rinse: combine one quart water, one teaspoon salt and one teaspoon baking soda.  When should I call my care team?  Call your care team if:    You still have problems after trying the steps listed here.    Your symptoms get worse.    You develop grooves on your tongue.    You cannot eat your usual amount of food, and you begin to lose weight.    You notice food sticking to your teeth, gums and tongue.  Comments:  __________________________________________  __________________________________________  __________________________________________  __________________________________________  __________________________________________  __________________________________________  __________________________________________  __________________________________________  Food group Recommended foods Foods to avoid   Meats, eggs and cheese Moist meats, chicken, turkey and fish served in sauce, gravy or salad dressing. Soups, stews, casseroles, scrambled or soft-boiled eggs, cottage cheese. Dry meats,  chicken, turkey and fish. Any meats with a dry, crunchy coating or breading.   Breads and starches Breads, rolls, cereals, pasta or rice soaked in milk, gravy or sauce. Dry breads, rolls, cereals, pasta and rice. Pretzels, chips, crackers.   Fruits and vegetables Canned and juicy fresh fruits (like peaches, pears, melons, watermelon and applesauce). Frozen berries, grapes, melons or peaches. Cooked vegetables in sauce. Dried fruits, vegetables without sauce.   Milk products Milk, half-and-half, milk shakes, ice cream.    Drinks Any fruit juice. Caffeine-free coffee, tea and soda pop. Tomato juice, caffeine, alcohol.   Desserts Sherbet, gelatin and pudding. Cakes, cookies, pies (unless soaked in milk first).   Other Butter, margarine, sour cream, salad dressing.    For informational purposes only. Not to replace the advice of your health care provider. Copyright   2006 [employer]. All rights reserved. Clinically reviewed by [employer]. [employer] 571040 - REV 04/19.       Patient Education   Personalized Prevention Plan  You are due for the preventive services outlined below.  Your care team is available to assist you in scheduling these services.  If you have already completed any of these items, please share that information with your care team to update in your medical record.  Health Maintenance Due   Topic Date Due     Osteoporosis Screening  Never done     Hepatitis C Screening  Never done     Hepatitis B Vaccine (1 of 3 - Risk 3-dose series) Never done     Diptheria Tetanus Pertussis (DTAP/TDAP/TD) Vaccine (1 - Tdap) Never done     Zoster (Shingles) Vaccine (1 of 2) Never done     Pneumococcal Vaccine (1 of 1 - PPSV23) Never done     Mammogram  07/16/2021     Cholesterol Lab  09/01/2021     Preventive Health Recommendations    See your health care provider every year to    Review health changes.     Discuss preventive care.      Review your medicines if your doctor has prescribed  any.    You no longer need a yearly Pap test unless you've had an abnormal Pap test in the past 10 years. If you have vaginal symptoms, such as bleeding or discharge, be sure to talk with your provider about a Pap test.    Every 1 to 2 years, have a mammogram.  If you are over 69, talk with your health care provider about whether or not you want to continue having screening mammograms.    Every 10 years, have a colonoscopy. Or, have a yearly FIT test (stool test). These exams will check for colon cancer.     Have a cholesterol test every 5 years, or more often if your doctor advises it.     Have a diabetes test (fasting glucose) every three years. If you are at risk for diabetes, you should have this test more often.     At age 65, have a bone density scan (DEXA) to check for osteoporosis (brittle bone disease).    Shots:    Get a flu shot each year.    Get a tetanus shot every 10 years.    Talk to your doctor about your pneumonia vaccines. There are now two you should receive - Pneumovax (PPSV 23) and Prevnar (PCV 13).    Talk to your pharmacist about the shingles vaccine.    Talk to your doctor about the hepatitis B vaccine.    Nutrition:     Eat at least 5 servings of fruits and vegetables each day.    Eat whole-grain bread, whole-wheat pasta and brown rice instead of white grains and rice.    Get adequate Calcium and Vitamin D.     Lifestyle    Exercise at least 150 minutes a week (30 minutes a day, 5 days a week). This will help you control your weight and prevent disease.    Limit alcohol to one drink per day.    No smoking.     Wear sunscreen to prevent skin cancer.     See your dentist twice a year for an exam and cleaning.    See your eye doctor every 1 to 2 years to screen for conditions such as glaucoma, macular degeneration and cataracts.    Personalized Prevention Plan  You are due for the preventive services outlined below.  Your care team is available to assist you in scheduling these services.  If  you have already completed any of these items, please share that information with your care team to update in your medical record.  Health Maintenance   Topic Date Due     DEXA  Never done     HEPATITIS C SCREENING  Never done     HEPATITIS B IMMUNIZATION (1 of 3 - Risk 3-dose series) Never done     DTAP/TDAP/TD IMMUNIZATION (1 - Tdap) Never done     ZOSTER IMMUNIZATION (1 of 2) Never done     Pneumococcal Vaccine: 65+ Years (1 of 1 - PPSV23) Never done     MAMMO SCREENING  07/16/2021     LIPID  09/01/2021     ANNUAL REVIEW OF HM ORDERS  08/13/2022     FALL RISK ASSESSMENT  08/13/2022     MEDICARE ANNUAL WELLNESS VISIT  12/16/2022     COLORECTAL CANCER SCREENING  10/01/2025     ADVANCE CARE PLANNING  12/16/2026     PHQ-2  Completed     INFLUENZA VACCINE  Completed     COVID-19 Vaccine  Completed     IPV IMMUNIZATION  Aged Out     MENINGITIS IMMUNIZATION  Aged Out       Signs of Hearing Loss      Hearing much better with one ear can be a sign of hearing loss.   Hearing loss is a problem shared by many people. In fact, it is one of the most common health problems, particularly as people age. Most people age 65 and older have some hearing loss. By age 80, almost everyone does. Hearing loss often occurs slowly over the years. So you may not realize your hearing has gotten worse.  Have your hearing checked  Call your healthcare provider if you:    Have to strain to hear normal conversation    Have to watch other people s faces very carefully to follow what they re saying    Need to ask people to repeat what they ve said    Often misunderstand what people are saying    Turn the volume of the television or radio up so high that others complain    Feel that people are mumbling when they re talking to you    Find that the effort to hear leaves you feeling tired and irritated    Notice, when using the phone, that you hear better with one ear than the other  [place] last reviewed this educational content on 1/1/2020     4254-9248 [employer]. All rights reserved. This information is not intended as a substitute for professional medical care. Always follow your healthcare professional's instructions.        Your Health Risk Assessment indicates you feel you are not in good emotional health.    Recreation   Recreation is not limited to sports and team events. It includes any activity that provides relaxation, interest, enjoyment, and exercise. Recreation provides an outlet for physical, mental, and social energy. It can give a sense of worth and achievement. It can help you stay healthy.    Mental Exercise and Social Involvement  Mental and emotional health is as important as physical health. Keep in touch with friends and family. Stay as active as possible. Continue to learn and challenge yourself.   Things you can do to stay mentally active are:    Learn something new, like a foreign language or musical instrument.     Play SCRABBLE or do crossword puzzles. If you cannot find people to play these games with you at home, you can play them with others on your computer through the Internet.     Join a games club--anything from card games to chess or checkers or lawn bowling.     Start a new hobby.     Go back to school.     Volunteer.     Read.   Keep up with world events.    Preventing Falls at Home  A person can fall for many reasons. Older adults may fall because reaction time slows down as we age. Your muscles and joints may get stiff, weak, or less flexible because of illness, medicines, or a physical condition.   Other health problems that make falls more likely include:     Arthritis    Dizziness or lightheadedness when you stand up (orthostatic hypotension)    History of a stroke    Dizziness    Anemia    Certain medicines taken for mental illness or to control blood pressure.    Problems with balance or gait    Bladder or urinary problems    History of falling    Changes in vision (vision impairment)    Changes  in thinking skills and memory (cognitive impairment)  Injuries from a fall can include serious injuries such as broken bones, dislocated joints, internal bleeding and cuts. Injuries like these can limit your independence.   Prevention tips  To help prevent falls and fall-related injuries, follow the tips below.    Floors  To make floors safer:     Put nonskid pads under area rugs.    Remove small rugs.    Replace worn floor coverings.    Tack carpets firmly to each step on carpeted stairs. Put nonskid strips on the edges of uncarpeted stairs.    Keep floors and stairs free of clutter and cords.    Arrange furniture so there are clear pathways.    Clean up any spills right away.  Bathrooms    To make bathrooms safer:     Install grab bars in the tub or shower.    Apply nonskid strips or put a nonskid rubber mat in the tub or shower.    Sit on a bath chair to bathe.    Use bathmats with nonskid backing.  Lighting  To improve visibility in your home:      Keep a flashlight in each room. Or put a lamp next to the bed within easy reach.    Put nightlights in the bedrooms, hallways, kitchen, and bathrooms.    Make sure all stairways have good lighting.    Take your time when going up and down stairs.    Put handrails on both sides of stairs and in walkways for more support. To prevent injury to your wrist or arm, don t use handrails to pull yourself up.    Install grab bars to pull yourself up.    Move or rearrange items that you use often. This will make them easier to find or reach.    Look at your home to find any safety hazards. Especially look at doorways, walkways, and the driveway. Remove or repair any safety problems that you find.  Other changes to make    Look around to find any safety hazards. Look closely at doorways, walkways, and the driveway. Remove or repair any safety problems that you find.    Wear shoes that fit well.    Take your time when going up and down stairs.    Put handrails on both sides of  stairs and in walkways for more support. To prevent injury to your wrist or arm, don t use handrails to pull yourself up.    Install grab bars wherever needed to pull yourself up.    Arrange items that you use often. This will make them easier to find or reach.    [place] last reviewed this educational content on 3/1/2020    3410-3982 [employer]. All rights reserved. This information is not intended as a substitute for professional medical care. Always follow your healthcare professional's instructions.

## 2021-12-16 NOTE — PROGRESS NOTES
"SUBJECTIVE:   Emperatriz Esteban is a 66 year old female who presents for Preventive Visit.    Dry Mouth  Emperatriz reports very dry mouth, especially at night, which she attributes to using trazodone, which she uses nightly. She cut back dose and didn't see any change. She reports drinking coffee in the morning and alcohol every night. She denies smoking or marijuana use. She has been drinking less water at night, she feels she might be dehydrated.    Hypothyroidism Follow-up      Since last visit, patient describes the following symptoms: Weight stable, no hair loss, no skin changes, no constipation, no loose stools    Impaired fasting glucose Follow-up      Patient is checking blood sugars: not at all    Diabetic concerns: None     Symptoms of hypoglycemia (low blood sugar): none     Paresthesias (numbness or burning in feet) or sores: No      Lab Results   Component Value Date    A1C 5.6 07/29/2016    A1C 5.4 10/19/2015            Patient has been advised of split billing requirements and indicates understanding: Yes   Are you in the first 12 months of your Medicare coverage?  No    Healthy Habits:     In general, how would you rate your overall health?  Excellent    Frequency of exercise:  2-3 days/week    Duration of exercise:  15-30 minutes    Do you usually eat at least 4 servings of fruit and vegetables a day, include whole grains    & fiber and avoid regularly eating high fat or \"junk\" foods?  Yes    Taking medications regularly:  Yes    Medication side effects:  None    Ability to successfully perform activities of daily living:  No assistance needed    Home Safety:  No safety concerns identified    Hearing Impairment:  Need to ask people to speak up or repeat themselves and no hearing concerns    In the past 6 months, have you been bothered by leaking of urine?  No    In general, how would you rate your overall mental or emotional health?  Fair      PHQ-2 Total Score: 1    Additional concerns today:  " Yes    Do you feel safe in your environment? Yes    Have you ever done Advance Care Planning? (For example, a Health Directive, POLST, or a discussion with a medical provider or your loved ones about your wishes): No, advance care planning information given to patient to review.  Patient plans to discuss their wishes with loved ones or provider.         Fall risk  Fallen 2 or more times in the past year?: Yes  Any fall with injury in the past year?: Yes  Timed Up and Go Test (>13.5 is fall risk; contact physician) : 10    Cognitive Screening   1) Repeat 3 items (Leader, Season, Table)    2) Clock draw: NORMAL  3) 3 item recall: Recalls 2 objects   Results: NORMAL clock, 1-2 items recalled: COGNITIVE IMPAIRMENT LESS LIKELY    Mini-CogTM Copyright S Kirby. Licensed by the author for use in [facility]; reprinted with permission (nena@[facility]). All rights reserved.      Do you have sleep apnea, excessive snoring or daytime drowsiness?: no    Reviewed and updated as needed this visit by clinical staff  Tobacco  Allergies  Meds  Problems            Reviewed and updated as needed this visit by Provider   Allergies  Meds  Problems           Social History     Tobacco Use     Smoking status: Former Smoker     Quit date: 1997     Years since quittin.9     Smokeless tobacco: Never Used   Substance Use Topics     Alcohol use: Yes     Alcohol/week: 4.0 - 5.0 standard drinks     Types: 4 - 5 Standard drinks or equivalent per week       Alcohol Use 2021   Prescreen: >3 drinks/day or >7 drinks/week? Yes   Prescreen: >3 drinks/day or >7 drinks/week? -   AUDIT SCORE  4             Current providers sharing in care for this patient include:   Patient Care Team:  Nayana Wheat MD as PCP - General (Family Medicine)  Cydney Montes MD as MD (Ophthalmology)  Cydney Montes MD as Assigned Surgical Provider  Nayana Wheat MD as Assigned PCP    The following [facility]  maintenance items are reviewed in Epic and correct as of today:  Health Maintenance Due   Topic Date Due     DEXA  Never done     HEPATITIS C SCREENING  Never done     HEPATITIS B IMMUNIZATION (1 of 3 - Risk 3-dose series) Never done     DTAP/TDAP/TD IMMUNIZATION (1 - Tdap) Never done     ZOSTER IMMUNIZATION (1 of 2) Never done     Pneumococcal Vaccine: 65+ Years (1 of 1 - PPSV23) Never done     MAMMO SCREENING  2021     LIPID  2021     BP Readings from Last 3 Encounters:   21 134/81   21 112/72   10/07/16 120/60    Wt Readings from Last 3 Encounters:   21 70.3 kg (155 lb)   21 69.4 kg (153 lb)   10/07/16 75.3 kg (166 lb)                  Patient Active Problem List   Diagnosis     Gastroesophageal reflux disease without esophagitis     Chronic rhinitis     Glaucoma suspect, unspecified laterality     Borderline personality disorder (H)     Bipolar affective disorder (H)     Other specified hypothyroidism     Other insomnia     Prediabetes     Past Surgical History:   Procedure Laterality Date     CATARACT IOL, RT/LT Bilateral      VITRECTOMY ANTERIOR Right 2019    for floaters        Social History     Tobacco Use     Smoking status: Former Smoker     Quit date: 1997     Years since quittin.9     Smokeless tobacco: Never Used   Substance Use Topics     Alcohol use: Yes     Alcohol/week: 4.0 - 5.0 standard drinks     Types: 4 - 5 Standard drinks or equivalent per week     Family History   Problem Relation Age of Onset     Glaucoma Father      Glaucoma Sister      Macular Degeneration No family hx of          Current Outpatient Medications   Medication Sig Dispense Refill     ARIPiprazole (ABILIFY) 5 MG tablet Take 1 tablet (5 mg) by mouth daily 90 tablet 1     aspirin 81 MG EC tablet Take 81 mg by mouth daily  90 tablet 3     atorvastatin (LIPITOR) 20 MG tablet Take 1 tablet (20 mg) by mouth daily 90 tablet 3     brimonidine (ALPHAGAN P) 0.1 % ophthalmic solution  Place 1 drop Into the left eye 2 times daily 5 mL 3     brimonidine (ALPHAGAN) 0.2 % ophthalmic solution        dorzolamide (TRUSOPT) 2 % ophthalmic solution Place 1 drop Into the left eye 2 times daily       dorzolamide-timolol (COSOPT) 2-0.5 % ophthalmic solution Place 1 drop into both eyes 2 times daily 10 mL 1     lamoTRIgine (LAMICTAL) 150 MG tablet Take 2 tablets (300 mg) by mouth daily 180 tablet 0     levothyroxine (SYNTHROID, LEVOTHROID) 25 MCG tablet Take 1 tablet (25 mcg) by mouth daily 90 tablet 1     montelukast (SINGULAIR) 10 MG tablet Take 1 tablet (10 mg) by mouth At Bedtime 90 tablet 3     Multiple Minerals-Vitamins (ADVANCED CALCIUM/D/MAGNESIUM) TABS Take 1,500 mg by mouth daily        multivitamin (OCUVITE) TABS Take 1 tablet by mouth daily  30 each 0     omega 3 1000 MG CAPS Take 3 g by mouth daily  90 capsule      omeprazole (PRILOSEC) 40 MG DR capsule Take 1 capsule (40 mg) by mouth daily Take 30-60 minutes before a meal. 90 capsule 3     sorbitol 70 % solution Take 60 mLs by mouth 2 times daily as needed (dry mouth) 472 mL 3     traZODone (DESYREL) 100 MG tablet Take 1 tablet (100 mg) by mouth nightly as needed for sleep 90 tablet 3     No Known Allergies  Recent Labs   Lab Test 09/01/16  0930 07/29/16  0000 10/19/15  0000   A1C  --  5.6 5.4   LDL 78 102 12   HDL 74 80* 51   TRIG 82 93 95   ALT 23  --   --    CR 0.94  --   --    GFRESTIMATED 60*  --   --    GFRESTBLACK 73  --   --    POTASSIUM 4.5  --   --    TSH 2.36  --   --       FHS-7:   Breast CA Risk Assessment (FHS-7) 12/16/2021   Did any of your first-degree relatives have breast or ovarian cancer? Yes   Did any of your relatives have bilateral breast cancer? No   Did any man in your family have breast cancer? No   Did any woman in your family have breast and ovarian cancer? Yes   Did any woman in your family have breast cancer before age 50 y? No   Do you have 2 or more relatives with breast and/or ovarian cancer? Yes   Do you have 2  "or more relatives with breast and/or bowel cancer? No       Mammogram Screening: Recommended mammography every 1-2 years with patient discussion and risk factor consideration  Pertinent mammograms are reviewed under the imaging tab.    Review of Systems   Constitutional: Negative for chills and fever.   HENT: Negative for congestion, ear pain, hearing loss and sore throat.    Eyes: Positive for visual disturbance. Negative for pain.   Respiratory: Negative for cough and shortness of breath.    Cardiovascular: Negative for chest pain, palpitations and peripheral edema.   Gastrointestinal: Positive for heartburn. Negative for abdominal pain, constipation, diarrhea, hematochezia and nausea.   Breasts:  Positive for tenderness. Negative for breast mass and discharge.   Genitourinary: Negative for dysuria, frequency, genital sores, hematuria, pelvic pain, urgency, vaginal bleeding and vaginal discharge.   Musculoskeletal: Negative for arthralgias, joint swelling and myalgias.   Skin: Negative for rash.   Neurological: Negative for dizziness, weakness, headaches and paresthesias.   Psychiatric/Behavioral: Negative for mood changes. The patient is nervous/anxious.        OBJECTIVE:   /81   Pulse 68   Temp 98.3  F (36.8  C)   Resp 16   Ht 1.676 m (5' 6\")   Wt 70.3 kg (155 lb)   SpO2 97%   Breastfeeding No   BMI 25.02 kg/m   Estimated body mass index is 25.02 kg/m  as calculated from the following:    Height as of this encounter: 1.676 m (5' 6\").    Weight as of this encounter: 70.3 kg (155 lb).  Physical Exam  GENERAL: healthy, alert and no distress  EYES: Eyes grossly normal to inspection, PERRL and conjunctivae and sclerae normal  HENT: ear canals and TM's normal, nose and mouth without ulcers or lesions  NECK: no adenopathy, no asymmetry, masses, or scars and thyroid normal to palpation  RESP: lungs clear to auscultation - no rales, rhonchi or wheezes  CV: regular rate and rhythm, normal S1 S2, no S3 or S4, " no murmur, click or rub, no peripheral edema and peripheral pulses strong  ABDOMEN: soft, nontender, no hepatosplenomegaly, no masses and bowel sounds normal  MS: no gross musculoskeletal defects noted, no edema  SKIN: no suspicious lesions or rashes  NEURO: Normal strength and tone, mentation intact and speech normal  PSYCH: mentation appears normal, affect flat, usual for her    ASSESSMENT / PLAN:       ICD-10-CM    1. Encounter for Medicare annual wellness exam  Z00.00    2. Dry mouth  R68.2 sorbitol 70 % solution     SSA Ro ANSLEY Antibody IgG     Vitamin B12     Folate     Magnesium   3. Other insomnia  G47.09 traZODone (DESYREL) 100 MG tablet   4. Impaired fasting glucose  R73.01 Glucose   5. Other specified hypothyroidism  E03.8 TSH with free T4 reflex   6. Encounter for screening mammogram for breast cancer  Z12.31 *MA Screening Digital Bilateral   7. CARDIOVASCULAR SCREENING; LDL GOAL LESS THAN 160  Z13.6 Lipid panel reflex to direct LDL Fasting   8. Post-menopause  Z78.0 DX Hip/Pelvis/Spine   9. Encounter for therapeutic drug monitoring  Z51.81 Comprehensive metabolic panel (BMP + Alb, Alk Phos, ALT, AST, Total. Bili, TP)     CBC with platelets and differential   10. Encounter for hepatitis C screening test for low risk patient  Z11.59 Hepatitis C Screen Reflex to HCV RNA Quant and Genotype     ASSESSMENT / PLAN:  (Z00.00) Encounter for Medicare annual wellness exam  (primary encounter diagnosis)  Comment: routine      (R68.2) Dry mouth  Comment: New, previously undiagnosed problem with uncertain prognosis and additional work-up planned.   Evaluate for Sjogren's, recheck thyroid. Alcohol may be contributing, I recommended cutting back or eliminating. Also consider reducing or stopping trazodone.  Will fu as indicated.   Plan: sorbitol 70 % solution, SSA Ro ANSLEY Antibody         IgG, Vitamin B12, Folate, Magnesium            (G47.09) Other insomnia  Comment: see above, adjust to reduce symptoms   Plan:  "traZODone (DESYREL) 100 MG tablet            (R73.01) Impaired fasting glucose  Comment:   Plan: Glucose            (E03.8) Other specified hypothyroidism  Comment:   TSH   Date Value Ref Range Status   09/01/2016 2.36 0.40 - 4.00 mU/L Final   ]    Plan: TSH with free T4 reflex            (Z12.31) Encounter for screening mammogram for breast cancer  Comment:   Plan: *MA Screening Digital Bilateral            (Z13.6) CARDIOVASCULAR SCREENING; LDL GOAL LESS THAN 160  Comment:   Plan: Lipid panel reflex to direct LDL Fasting            (Z78.0) Post-menopause  Comment:   Plan: DX Hip/Pelvis/Spine            (Z51.81) Encounter for therapeutic drug monitoring  Comment: she has been seen by psychiatry, on abilify, med monitoring recommended  Plan: Comprehensive metabolic panel (BMP + Alb, Alk         Phos, ALT, AST, Total. Bili, TP), CBC with         platelets and differential        Will fu as indicated.     (Z11.59) Encounter for hepatitis C screening test for low risk patient  Comment:   Plan: Hepatitis C Screen Reflex to HCV RNA Quant and         Genotype             Patient has been advised of split billing requirements and indicates understanding: Yes  COUNSELING:  Reviewed preventive health counseling, as reflected in patient instructions    Estimated body mass index is 25.02 kg/m  as calculated from the following:    Height as of this encounter: 1.676 m (5' 6\").    Weight as of this encounter: 70.3 kg (155 lb).        She reports that she quit smoking about 24 years ago. She has never used smokeless tobacco.      Appropriate preventive services were discussed with this patient, including applicable screening as appropriate for cardiovascular disease, diabetes, osteopenia/osteoporosis, and glaucoma.  As appropriate for age/gender, discussed screening for colorectal cancer, prostate cancer, breast cancer, and cervical cancer. Checklist reviewing preventive services available has been given to the patient.    Reviewed " patients plan of care and provided an AVS. The Intermediate Care Plan ( asthma action plan, low back pain action plan, and migraine action plan) for Emperatriz meets the Care Plan requirement. This Care Plan has been established and reviewed with the Patient.    Counseling Resources:  ATP IV Guidelines  Pooled Cohorts Equation Calculator  Breast Cancer Risk Calculator  Breast Cancer: Medication to Reduce Risk  FRAX Risk Assessment  ICSI Preventive Guidelines  Dietary Guidelines for Americans, 2010  [employer]'s MyPlate  ASA Prophylaxis  Lung CA Screening    Nayana Wheat MD  [facility]    Identified Health Risks:

## 2021-12-16 NOTE — PROGRESS NOTES
The patient was provided with written information regarding signs of hearing loss.  The patient was provided with suggestions to help her develop a healthy emotional lifestyle.  She is at risk for falling and has been provided with information to reduce the risk of falling at home.

## 2021-12-17 LAB — HCV AB SERPL QL IA: NONREACTIVE

## 2021-12-17 NOTE — RESULT ENCOUNTER NOTE
Hello!  It was a pleasure to see you in clinic!  Thank you for getting labs done. Everything looks normal, which is good news.    The testing of your blood sugar, kidney function, liver function and electrolytes was normal.     Your cbc, or complete blood count, which measures red blood cells (to check for anemia and other vitamin deficiencies) and white blood cells (to check for infection and leukemia) is normal, which is great!  Your platelets, which reflect liver function and ability to clot, are normal as well.     Your thyroid labs are normal, you are on the correct dose of thyroid replacement. Please continue to take this as you were.     Your screening test was negative for Hepatitis C, which is great news! You have NOT been infected with this liver disease.  You do not need any further testing for Hepatitis C.     Your vitamin B12 and folate levels are normal.    Your magnesium levels are slightly high, so if you're taking a supplement, please stop.    If you have any questions, please contact the clinic or schedule an appointment with me, thank you!      Sincerely,  Dr. Nayana Wheat MD  12/17/2021

## 2021-12-18 LAB
CHOLEST SERPL-MCNC: 185 MG/DL
FASTING STATUS PATIENT QL REPORTED: NO
HDLC SERPL-MCNC: 80 MG/DL
LDLC SERPL CALC-MCNC: 93 MG/DL
NONHDLC SERPL-MCNC: 105 MG/DL
TRIGL SERPL-MCNC: 61 MG/DL

## 2021-12-22 LAB
ENA SS-A AB SER IA-ACNC: <0.5 U/ML
ENA SS-A AB SER IA-ACNC: NEGATIVE

## 2021-12-28 ENCOUNTER — MYC MEDICAL ADVICE (OUTPATIENT)
Dept: FAMILY MEDICINE | Facility: CLINIC | Age: 66
End: 2021-12-28
Payer: MEDICARE

## 2021-12-28 DIAGNOSIS — N64.4 BREAST PAIN: Primary | ICD-10-CM

## 2021-12-28 NOTE — TELEPHONE ENCOUNTER
Dr. Wheat: pt reports need for diagnostic rather than preventative mammogram due to pain.  Pended appropriate order below.    Bindu Buckner RN  [facility]

## 2022-01-11 DIAGNOSIS — H40.003 GLAUCOMA SUSPECT OF BOTH EYES: ICD-10-CM

## 2022-01-12 RX ORDER — BRIMONIDINE TARTRATE 1 MG/ML
1 SOLUTION/ DROPS OPHTHALMIC 2 TIMES DAILY
Qty: 10 ML | Refills: 2 | Status: SHIPPED | OUTPATIENT
Start: 2022-01-12 | End: 2022-01-12 | Stop reason: ALTCHOICE

## 2022-01-12 NOTE — TELEPHONE ENCOUNTER
brimonidine (ALPHAGAN P) 0.1 % ophthalmic solution   Last Written Prescription Date:  11/2/2021  Last Fill Quantity: 5,   # refills: 3  Last Office Visit : 11/22/2021  Future Office visit:  None     Cydney Montes MD    Ophthalmology     PLAN  - follow up in 1-2 months with ONFL, optos and dilation  - I counseled patient about the importance of vigilant adherence to regular follow up and any future treatment plan to reduce the risk of vision loss and irreversible blindness that is undetectable subjectively until very late in advanced glaucoma.    - recommend to continue cosopt twice a day both eyes and brimonidine twice a day left eye   - recommend to follow up in 3 months with optos, autofluorescence, Optical Coherence Tomography, gonio and pachy   - PFAT as needed   - will alternate glaucoma testing     Routing refill request to provider for review/approval because:  Pt needs an office visit scheduled for a 3 month follow up appointment for February or March of 2022.   Please call Pt to schedule and fill med to cover Pt until next visit after it is scheduled.     Thank you       Mavis Murray RN  Central Triage Red Flags/Med Refills

## 2022-02-10 ENCOUNTER — HOSPITAL ENCOUNTER (OUTPATIENT)
Dept: BONE DENSITY | Facility: CLINIC | Age: 67
End: 2022-02-10
Attending: FAMILY MEDICINE
Payer: MEDICARE

## 2022-02-10 ENCOUNTER — HOSPITAL ENCOUNTER (OUTPATIENT)
Dept: MAMMOGRAPHY | Facility: CLINIC | Age: 67
End: 2022-02-10
Attending: FAMILY MEDICINE
Payer: MEDICARE

## 2022-02-10 DIAGNOSIS — N64.4 BREAST PAIN: ICD-10-CM

## 2022-02-10 DIAGNOSIS — F31.31 BIPOLAR AFFECTIVE DISORDER, CURRENTLY DEPRESSED, MILD (H): ICD-10-CM

## 2022-02-10 DIAGNOSIS — H40.003 GLAUCOMA SUSPECT OF BOTH EYES: ICD-10-CM

## 2022-02-10 DIAGNOSIS — Z78.0 POST-MENOPAUSE: ICD-10-CM

## 2022-02-10 PROCEDURE — 77080 DXA BONE DENSITY AXIAL: CPT

## 2022-02-10 PROCEDURE — 77062 BREAST TOMOSYNTHESIS BI: CPT

## 2022-02-10 RX ORDER — DORZOLAMIDE HYDROCHLORIDE AND TIMOLOL MALEATE 20; 5 MG/ML; MG/ML
1 SOLUTION/ DROPS OPHTHALMIC 2 TIMES DAILY
Qty: 10 ML | Refills: 11 | Status: SHIPPED | OUTPATIENT
Start: 2022-02-10 | End: 2022-12-05

## 2022-02-10 NOTE — TELEPHONE ENCOUNTER
--Last visit:  12/16/2021 AGUEDA.    --Last visit:  11/15/21 Taj.    --Future Visit: 2/14/22 Taj.

## 2022-02-11 RX ORDER — LAMOTRIGINE 150 MG/1
TABLET ORAL
Qty: 180 TABLET | Refills: 0 | Status: SHIPPED | OUTPATIENT
Start: 2022-02-11 | End: 2022-02-14

## 2022-02-11 NOTE — PROGRESS NOTES
[facility] Collaborative Care Psychiatry Service  February 14, 2022    Behavioral Health Clinician Progress Note    Patient Name: Emperatriz Esteban      Telemedicine Visit: The patient's condition can be safely assessed and treated via synchronous audio and visual telemedicine encounter.      Reason for Telemedicine Visit: Services only offered telehealth    Originating Site (Patient Location): Patient's home    Distant Site (Provider Location): Provider Remote Setting- Home Office    Consent:  The patient/guardian has verbally consented to: the potential risks and benefits of telemedicine (video visit) versus in person care; bill my insurance or make self-payment for services provided; and responsibility for payment of non-covered services.     Mode of Communication:  Video Conference via [employer]    As the provider I attest to compliance with applicable laws and regulations related to telemedicine.         Service Type:  Individual      Service Location:   Face to Face in Home / Community     Session Start Time: 122pm  Session End Time: 145pm      Session Length: 16 - 37      Attendees: Patient    Visit Activities (Refresh list every visit): [facility] Only    Diagnostic Assessment Date: 11/15/2021  Treatment Plan Review Date: 05/14/2022  See Flowsheets for today's PHQ-9 and VAL-7 results  Previous PHQ-9:   PHQ-9 SCORE 2/14/2022 2/14/2022   PHQ-9 Total Score MyChart - 3 (Minimal depression)   PHQ-9 Total Score 3 3     Previous VAL-7: No flowsheet data found. Will be completed with pt next session.       DATA  Extended Session (60+ minutes): No  Interactive Complexity: No  Crisis: No  [facility] Patient: No    Treatment Objective(s) Addressed in This Session:  Target Behavior(s): anxiety, stress eating, recent move, taking care of cousin    Anxiety: will experience a reduction in anxiety, will develop more effective coping skills to manage anxiety symptoms, will develop healthy cognitive patterns and beliefs and will  "increase ability to function adaptively    Current Stressors / Issues:  Worse/Better/Same: Pt reports that they are stable. Pt says that they have always experienced anxiety. Has full blow glaucoma, and bipolar and borderline and pt says that they do not have borderline. Pt reduced trazodone and it didn't help. Moved in May as well and is adjusting.   Side Effects: one gives terrible dry mouth, not sure what medication is dr. Nunez prescribed medication and it is not covered  Mood: \"been okay, in a very difficult life situation and taking care of cousin in their 90s.\"  Appetite: stress eat, and don't like weight they are at  Sleep: unremarkable, had a couple of night where didn't sleep well   Pregnancy:No  Suicidality: no current, yes in the past did not act on it   Self-harm: Pt denies any current or in past.  Substance Use: Alcohol: drink a little bit, 1 drink daily   Caffeine: drink coffee in am and sometimes in the afternoon  Therapist: Yes meeting with a therapist, Cerebral thing, but would like a referral for someone else if it can be covered with insurance   Interventions: coordinator for ideas of therapy   Medication Questions/Requests: Pt says that Medicare no longer covers medication that they are taking- ariprisol and they can ask for a tier exemption and would like to go back to taking generic of Latuda, would like to talk about bipolar and borderline, antipsychotic medication and supplementing with Buspar, scared been on them for years          Progress on Treatment Objective(s) / Homework:  Satisfactory progress - ACTION (Actively working towards change); Intervened by reinforcing change plan / affirming steps taken    Motivational Interviewing    MI Intervention: Co-Developed Goal: to find a therapist that works for the patient, Expressed Empathy/Understanding, Supported Autonomy, Collaboration, Evocation, Permission to raise concern or advise, Open-ended questions, Reflections: simple and complex, " Change talk (evoked) and Reframe     Change Talk Expressed by the Patient: Committment to change Taking steps    Provider Response to Change Talk: E - Evoked more info from patient about behavior change, A - Affirmed patient's thoughts, decisions, or attempts at behavior change, R - Reflected patient's change talk and S - Summarized patient's change talk statements    Also provided psychoeducation about behavioral health condition, symptoms, and treatment options    Care Plan review completed: Yes    Medication Review:  No changes to current psychiatric medication(s)    Medication Compliance:  Yes    Changes in Health Issues:   None reported    Chemical Use Review:   Substance Use: Chemical use reviewed, no active concerns identified      Tobacco Use: No current tobacco use.      Assessment: Current Emotional / Mental Status (status of significant symptoms):  Risk status (Self / Other harm or suicidal ideation)  Patient has had a history of suicidal ideation: in the past and did not act on it  Patient denies current fears or concerns for personal safety.  Patient denies current or recent suicidal ideation or behaviors.  Patient denies current or recent homicidal ideation or behaviors.  Patient denies current or recent self injurious behavior or ideation.  Patient denies other safety concerns.  A safety and risk management plan has not been developed at this time, however patient was encouraged to call [place] [phone] should there be a change in any of these risk factors.    Appearance:   Appropriate   Eye Contact:   Good   Psychomotor Behavior: Normal   Attitude:   Cooperative   Orientation:   All  Speech   Rate / Production: Normal    Volume:  Normal   Mood:    Normal  Affect:    Appropriate   Thought Content:  Clear   Thought Form:  Coherent  Logical   Insight:    Good     Diagnoses:  1. Bipolar affective disorder, currently depressed, mild (H)        Collateral Reports Completed:  Communicated with:    [facility]      Plan: (Homework, other):  Patient was given information about behavioral services and instructed to schedule a follow up appointment with the [facility].  She was also given information about mental health symptoms and treatment options .  CD Recommendations: No indications of CD issues.     ______________________________________________________________________    [facility] Psychiatry Service    Patient's Name: Emperatriz Esteban  YOB: 1955    Date: 2/14/2022    DSM-V Diagnoses: Bipolar affective disorder, currently depressed, mild   Psychosocial / Contextual Factors: recent move, stress eating, taking care of cousin        Anticipated number of session or this episode of care: 4-6      MeasurableTreatment Goal(s) related to diagnosis / functional impairment(s)  Goal 1: Patient will be referred to a therapist that meets their needs and is covered by their insurance.     I will know I've met my goal when I have a therapist that is covered by my insurance.      Objective #A (Patient Action)    Patient will [facility] will help patient get set-up with a therapist that meets their therapy needs and is covered by insurance.   Status: New - Date: 02/14/2022     Intervention(s)  [facility] will use resources to assist patient each meeting to locate a therapist covered by their insurance and inquire about how the patient is feeling about therapy and their therapist.      Patient has reviewed and agreed to the above plan.      Ana Cardona, [facility]  February 14, 2022

## 2022-02-11 NOTE — RESULT ENCOUNTER NOTE
Thank you for getting the DEXA bone density test done! The bone density test shows osteopenia. This is an intermediate category that is in between normal and osteoporosis.  I recommend getting 5082-5384 mg of calcium with vitamin D daily either in your diet or by taking a supplement.  I also strongly recommend getting daily weight bearing exercise (walking works!) or lift weights 2-3 times per week.     I recommend repeating your DEXA scan in about 2 years.    If you have any questions, please contact the clinic or schedule an appointment with me, thank you!    Sincerely,    Nayana Wheat MD   2/11/2022

## 2022-02-14 ENCOUNTER — VIRTUAL VISIT (OUTPATIENT)
Dept: PSYCHIATRY | Facility: CLINIC | Age: 67
End: 2022-02-14
Payer: MEDICARE

## 2022-02-14 ENCOUNTER — VIRTUAL VISIT (OUTPATIENT)
Dept: BEHAVIORAL HEALTH | Facility: CLINIC | Age: 67
End: 2022-02-14
Payer: MEDICARE

## 2022-02-14 DIAGNOSIS — F31.31 BIPOLAR AFFECTIVE DISORDER, CURRENTLY DEPRESSED, MILD (H): Primary | ICD-10-CM

## 2022-02-14 DIAGNOSIS — F41.1 GAD (GENERALIZED ANXIETY DISORDER): ICD-10-CM

## 2022-02-14 DIAGNOSIS — F99 INSOMNIA DUE TO OTHER MENTAL DISORDER: ICD-10-CM

## 2022-02-14 DIAGNOSIS — F51.05 INSOMNIA DUE TO OTHER MENTAL DISORDER: ICD-10-CM

## 2022-02-14 PROCEDURE — 99214 OFFICE O/P EST MOD 30 MIN: CPT | Mod: 95 | Performed by: NURSE PRACTITIONER

## 2022-02-14 PROCEDURE — 90833 PSYTX W PT W E/M 30 MIN: CPT | Mod: 95 | Performed by: COUNSELOR

## 2022-02-14 RX ORDER — LURASIDONE HYDROCHLORIDE 20 MG/1
20 TABLET, FILM COATED ORAL DAILY
Qty: 90 TABLET | Refills: 0 | Status: SHIPPED | OUTPATIENT
Start: 2022-02-14 | End: 2022-04-20

## 2022-02-14 RX ORDER — LAMOTRIGINE 150 MG/1
TABLET ORAL
Qty: 180 TABLET | Refills: 0 | Status: SHIPPED | OUTPATIENT
Start: 2022-02-14 | End: 2022-04-27

## 2022-02-14 RX ORDER — ARIPIPRAZOLE 5 MG/1
5 TABLET ORAL DAILY
Qty: 21 TABLET | Refills: 0 | COMMUNITY
Start: 2022-02-14 | End: 2022-02-18

## 2022-02-14 RX ORDER — TRAZODONE HYDROCHLORIDE 100 MG/1
100 TABLET ORAL
Qty: 90 TABLET | Refills: 3 | Status: SHIPPED | OUTPATIENT
Start: 2022-02-14 | End: 2023-01-23

## 2022-02-14 ASSESSMENT — PATIENT HEALTH QUESTIONNAIRE - PHQ9
SUM OF ALL RESPONSES TO PHQ QUESTIONS 1-9: 3
SUM OF ALL RESPONSES TO PHQ QUESTIONS 1-9: 3
10. IF YOU CHECKED OFF ANY PROBLEMS, HOW DIFFICULT HAVE THESE PROBLEMS MADE IT FOR YOU TO DO YOUR WORK, TAKE CARE OF THINGS AT HOME, OR GET ALONG WITH OTHER PEOPLE: NOT DIFFICULT AT ALL
SUM OF ALL RESPONSES TO PHQ QUESTIONS 1-9: 3
10. IF YOU CHECKED OFF ANY PROBLEMS, HOW DIFFICULT HAVE THESE PROBLEMS MADE IT FOR YOU TO DO YOUR WORK, TAKE CARE OF THINGS AT HOME, OR GET ALONG WITH OTHER PEOPLE: NOT DIFFICULT AT ALL
SUM OF ALL RESPONSES TO PHQ QUESTIONS 1-9: 3

## 2022-02-14 NOTE — PROGRESS NOTES
"Eulalia is a 66 year old who is being evaluated via a billable video visit.      How would you like to obtain your AVS? MyChart  If the video visit is dropped, the invitation should be resent by: Send to e-mail at: [email or web address]  Will anyone else be joining your video visit? No      Video Start Time: 2:07 PM  Video-Visit Details    Type of service:  Video Visit    Video End Time:2:25 PM    Originating Location (pt. Location): Home    Distant Location (provider location):  [facility]     Platform used for Video Visit: [employer]  Answers for HPI/ROS submitted by the patient on 2022  If you checked off any problems, how difficult have these problems made it for you to do your work, take care of things at home, or get along with other people?: Not difficult at all  PHQ9 TOTAL SCORE: 3        Outpatient Psychiatric Progress Note    Name: Emperatriz Esteban   : 1955                    Primary Care Provider: Nyaana Wheat MD   Therapist: Yes    PHQ-9 scores:  PHQ-9 SCORE 2022   PHQ-9 Total Score MyChart - 3 (Minimal depression)   PHQ-9 Total Score 3 3       VAL-7 scores:  No flowsheet data found.    Patient Identification:    Patient is a 66 year old year old,   White Not  or  female  who presents for return visit with me.  Patient is currently unemployed. Patient attended the session alone. Patient prefers to be called: \"Eulalia\".    Current medications include: aspirin 81 MG EC tablet, Take 81 mg by mouth daily   atorvastatin (LIPITOR) 20 MG tablet, Take 1 tablet (20 mg) by mouth daily  brimonidine (ALPHAGAN) 0.2 % ophthalmic solution, Place 1 drop Into the left eye 2 times daily  busPIRone (BUSPAR) 5 MG tablet, Take 1 tablet (5 mg) by mouth 2 times daily  dorzolamide-timolol (COSOPT) 2-0.5 % ophthalmic solution, Place 1 drop into both eyes 2 times daily  lamoTRIgine (LAMICTAL) 150 MG tablet, TAKE TWO TABLETS BY MOUTH " DAILY.  levothyroxine (SYNTHROID, LEVOTHROID) 25 MCG tablet, Take 1 tablet (25 mcg) by mouth daily  montelukast (SINGULAIR) 10 MG tablet, Take 1 tablet (10 mg) by mouth At Bedtime  Multiple Minerals-Vitamins (ADVANCED CALCIUM/D/MAGNESIUM) TABS, Take 1,500 mg by mouth daily   multivitamin (OCUVITE) TABS, Take 1 tablet by mouth daily   omega 3 1000 MG CAPS, Take 3 g by mouth daily   omeprazole (PRILOSEC) 40 MG DR capsule, Take 1 capsule (40 mg) by mouth daily Take 30-60 minutes before a meal.  sorbitol 70 % solution, Take 60 mLs by mouth 2 times daily as needed (dry mouth)  traZODone (DESYREL) 100 MG tablet, Take 1 tablet (100 mg) by mouth nightly as needed for sleep    No current facility-administered medications on file prior to visit.       The [place] Prescription Monitoring Program has been reviewed and there are no concerns about diversionary activity for controlled substances at this time.      I was able to review most recent Primary Care Provider, specialty provider, and therapy visit notes that I have access to.     Today, patient reports that she cannot afford the Abilify.  In the past she tried Latuda and remembers that it worked well.  In the past she has also tried lithium and Lamictal.  If she does not take her Abilify she notices an increase in irritability and, physically, she feels ill.  She is sleeping well at night.  She has gained some weight.  She is on [employer] watchers.  She lives alone. Her cousin who is 96 lives where she lives.  She cooks dinner for her.  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  Depression is a little better since settling in to her new place.       has a past medical history of Bipolar 1 disorder (H).    Social history updates:    Eulalia ;rabia alone.    Substance use updates:    No alcohol use reported  Tobacco use: No    Vital Signs:   There were no vitals taken for this visit.    Labs:    Most recent laboratory results reviewed and no new labs.      Mental Status Examination:  Appearance:  awake, alert and casually dressed  Attitude:  cooperative   Eye Contact:  adequate  Gait and Station: No assistive Devices used and No dizziness or falls  Psychomotor Behavior:  intact station, gait and muscle tone  Oriented to:  time, person, and place  Attention Span and Concentration:  Normal  Speech:   clear, coherent and Speaks: English  Mood:  anxious and depressed  Affect:  mood congruent  Associations:  no loose associations  Thought Process:  goal oriented  Thought Content:  no evidence of suicidal ideation or homicidal ideation, no auditory hallucinations present and no visual hallucinations present  Recent and Remote Memory:  intact Not formally assessed. No amnesia.  Fund of Knowledge: appropriate  Insight:  good  Judgment:  intact  Impulse Control:  intact    Suicide Risk Assessment:  Today Emperatriz Esteban reports that she is having no thoughts to want to end her life or to harm other people. In addition, there are notable risk factors for self-harm, including single status, anxiety and mood change. However, risk is mitigated by commitment to family, history of seeking help when needed, future oriented, denies suicidal intent or plan and denies homicidal ideation, intent, or plan. Therefore, based on all available evidence including the factors cited above, Emperatriz Esteban does not appear to be at imminent risk for self-harm, does not meet criteria for a 72-hr hold, and therefore remains appropriate for ongoing outpatient level of care.  A thorough assessment of risk factors related to suicide and self-harm have been reviewed and are noted above. The patient convincingly denies suicidality on several occasions. Local community safety resources printed and reviewed for patient to use if needed. There was no deceit detected, and the patient presented in a manner that was believable.     DSM5 Diagnosis:  296.89 Bipolar II Disorder With mixed features and  mild  300.02 (F41.1) Generalized Anxiety Disorder  780.52 (G47.00) Insomnia Disorder   With non-sleep disorder mental comorbidity  Episodic      Medical comorbidities include:   Patient Active Problem List    Diagnosis Date Noted     Impaired fasting glucose 12/16/2021     Priority: Medium     Dry mouth 12/16/2021     Priority: Medium     Prediabetes 09/02/2016     Priority: Medium     Gastroesophageal reflux disease without esophagitis 09/01/2016     Priority: Medium     Chronic rhinitis 09/01/2016     Priority: Medium     Takes singulair       Glaucoma suspect, unspecified laterality 09/01/2016     Priority: Medium     Borderline personality disorder (H) 09/01/2016     Priority: Medium     Follows with psychiatry in [place], [place] (in [place] for a few months)       Bipolar affective disorder (H) 09/01/2016     Priority: Medium     Follows with psychiatry in [place], [place] (in [place] for a few months)         Other specified hypothyroidism 09/01/2016     Priority: Medium     Other insomnia 09/01/2016     Priority: Medium       Assessment:    Emperatriz Esteban visited with me today to inform me that, now that she is more settled in her new residence, her symptoms have subsided slightly.  She tells me if she does not take the Abilify she becomes highly irritable.  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  Sleep continues to be disrupted periodically..  Financial stress continues to be an issue for her which creates difficulties in affording her medications.  She would like to switch to Latuda instead of Abilify and will start that at 20 mg daily with food.  Lamictal will continue at 300 mg daily.  Trazodone will continue at 100 mg at bedtime as needed for insomnia.  Talk therapy is recommended to learn ways to cope with life stressors.    Medication side effects and alternatives were reviewed. Health promotion activities recommended and reviewed today. All questions addressed. Education  and counseling completed regarding risks and benefits of medications and psychotherapy options.    Treatment Plan:        Abilify discontinued once you start lurasidone    Lurasidone 20 mg daily with food    Continue Lamictal 300 mg daily    Continue trazodone 100 mg at bedtime    Talk therapy when able to to learn ways to cope with life stressors        Continue all other medications as reviewed per electronic medical record today.     Safety plan reviewed. To the Emergency Department as needed or call after hours crisis line at [phone] or [phone]. [place] Crisis Text Line. Text [place] to 065068 or Suicide LifeLine Chat: suicideSyncapse.org/chat/    To schedule individual or family therapy, call [facility] at [phone].    Schedule an appointment with me in 6 weeks or sooner as needed. Call [facility] at [phone] to schedule.    Follow up with primary care provider as planned or for acute medical concerns.    Call the psychiatric nurse line with medication questions or concerns at [phone].    [employer] may be used to communicate with your provider, but this is not intended to be used for emergencies.    Crisis Resources:    National Suicide Prevention Lifeline: 373.679.3022 (TTY: 404.642.6033). Call anytime for help.  (www.suicidepreventionlifeline.org)  National [place] on Mental Illness (www.rey.org): 425.935.8823 or 680-490-4951.   Mental Health Association (www.mentalhealth.org): 896.501.8979 or 773-280-7204.  [place] Crisis Text Line: Text [place] to 324017  Suicide LifeLine Chat: suicideSyncapse.org/chat    Administrative Billing:   Time spent with patient was 30 minutes and greater than 50% of time or 20 minutes was spent in counseling and coordination of care regarding above diagnoses and treatment plan.    Patient Status:  Patient will continue to be seen for ongoing consultation and stabilization.    Signed:   Malia Vang  PMHNP-BC   Psychiatry

## 2022-02-15 ASSESSMENT — PATIENT HEALTH QUESTIONNAIRE - PHQ9
SUM OF ALL RESPONSES TO PHQ QUESTIONS 1-9: 3
SUM OF ALL RESPONSES TO PHQ QUESTIONS 1-9: 3

## 2022-02-22 ENCOUNTER — MYC MEDICAL ADVICE (OUTPATIENT)
Dept: FAMILY MEDICINE | Facility: CLINIC | Age: 67
End: 2022-02-22
Payer: MEDICARE

## 2022-02-23 ENCOUNTER — TELEPHONE (OUTPATIENT)
Dept: PSYCHIATRY | Facility: CLINIC | Age: 67
End: 2022-02-23
Payer: MEDICARE

## 2022-02-23 NOTE — TELEPHONE ENCOUNTER
Malia Vang NP  Psych Rn - [facility] 9 minutes ago (12:13 PM)     [place]    I sent a message to the PA team  to check on the status of this.    Message text       Malia Vang NP  Prior Auth Psych Medication 10 minutes ago (12:12 PM)     [place]    Would you  have any information on the status of this?  Thank you.    Message text      Robina Gallo RN Juhl, Juhl M 22 hours ago (1:49 PM)     JASVIR Awan -  I am forwarding this new message to Malia about your request for a fax statement. Please wait for her reply.     Thank you  CHUCHO Quarles Nellie, RN Therkildsen, Vicky Lynn, NP 22 hours ago (1:48 PM)     [place]    Please review [employer] messages from patient.   Let me know how I can help     Routing comment      Eulalia Esteban Vicky Lynn, NP 22 hours ago (1:47 PM)           Dear Robina,  I just applied for a tier exemption from [facility] for Aripiprazol.  Malia will get a FAX asking for a statement from my doctor.  Please tell her that I am not only concerned about weight gain, I am concerned about increases in cholesterol levels.  Heart disease is rampant in my family.  Risperadol is not a good medication for me. Please ask her to make a strong case.  I have been taking Aripiprazol with success for several years.  If she fills out the form right away and it is approved, they will process it in within 72 hours. Then you can send the prescription to the [facility] on [address] in [place] where my sister can take me to pick it up.     Thank you for your kindness on the phone.  I am much better now.  Robina Calderon RN Therkildsen, Vicky Lynn, NP 22 hours ago (1:43 PM)     [place]    Please review [employer] messages from patient.   Let me know how I can help    Routing comment      Robina Gallo RN Juhl, Juhl M 22 hours ago (1:43 PM)     JASVIR Awan -   I will forward your request to Malia, Please wait for her reply.   Thank you  CHUCHO Quarles  Malia Barnard, NP 23 hours ago (12:58 PM)     GAUDENCIO      Risperadole causes significant weight gain.  That is not healthy, and I cannot handle it emotionally.  Please tell Malia to work on the tier exemption.  She told me she would do that. Thank you.         Eulalia Esteban Vicky Lynn, NP 23 hours ago (12:49 PM)     GAUDENCIO Quarles,     Please send the prescription to [facility] ([phone]). They send medication quickly. Or you could send the prescription to the [facility] in the [facility] on [address].   I live nowhere near [place] and don't drive.  I live fairly close to [address].  Please let me know when it is sent to that pharmacy or to [facility].       Thank you.  Emperatriz Esteban

## 2022-02-24 NOTE — TELEPHONE ENCOUNTER
"I called patient. Please see TE to Psychiatry dated yesterday.  Patient is concerned as she has been switched from an antipsychotic that she has taken for years (aripiprizole) to Buspar which is in a different drug class.  Patient 2 days ago started taking 1/2 tab of her Aripiprizole along with Buspar 5 mg.  States \"so far I am OK, but it has only been 2 days\"  Currently has 7 tabs of the Abilify left and she uses [facility] which is mail order.    Per chart review Psychiatry has this morning sent a message to the PA team asking if they have any information on PA for Tier exemption to lower cost.  Cost jumped considerably and is now $800 to $1100 for a 3 month supply.  Please Advise.  Angella Bahena RN  [facility]  "

## 2022-02-25 NOTE — TELEPHONE ENCOUNTER
Malia Vang NP  Psych Rn - [facility] Yesterday (9:14 AM)     [place]    Please check with the PA team tomorrow for status    Message text      Please review, provider wanting update.

## 2022-02-25 NOTE — TELEPHONE ENCOUNTER
Central Prior Authorization Team   Phone: [phone]      PA ALREADY DENIED    Medication: aripiprazole  Insurance Company: [employer] - Phone [phone] Fax [phone]  Pharmacy Filling the Rx: [facility] [place] [place] - [address] AT PORTAL TO REGISTERED [facility] SITES  Filling Pharmacy Phone: [phone]  Filling Pharmacy Fax:    Start Date: 2/25/2022    Called and spoke with Maranda at [facility].  She states that they faxed information over to [phone] to have the provider make a statement and fax back on the tier exception.  No response was given so it was denied yesterday (2/24/22). Denial has also been faxed to [phone], you'll have to look for it and appeal it,  since the Central PA Team did not do the PA, we do not handle the appeals.

## 2022-02-26 ENCOUNTER — TELEPHONE (OUTPATIENT)
Dept: PSYCHIATRY | Facility: CLINIC | Age: 67
End: 2022-02-26

## 2022-02-27 NOTE — TELEPHONE ENCOUNTER
Central Prior Authorization Team   Phone: [phone]    PA Initiation    Medication: ARIPiprazole (ABILIFY) 5 MG tablet  Insurance Company: [employer] - Phone [phone] Fax [phone]  Pharmacy Filling the Rx:    Filling Pharmacy Phone:    Filling Pharmacy Fax:    Start Date: 2/22/2022    The patient's provider sent me a message a few days ago requesting a Tier Exception for the patient - I received the form, but I did not complete it in time and her request was denied. I have submitted an urgent appeal to [facility] with chart notes from the provider and the patient's statement about her therapy. I also sent a journal article and did explain that she is stable on the therapy and her and her provider do not wish to change it due to her/provider's concern with weight gain and heart disease.

## 2022-02-28 NOTE — TELEPHONE ENCOUNTER
Faxed additional journal articles and document about which therapies she is ok and then previous one.

## 2022-03-01 NOTE — TELEPHONE ENCOUNTER
Central Prior Authorization Team   Phone: [phone]      Prior Authorization Approval - TIER EXCEPTION APPROVED    Authorization Effective Date:  02/24/2022  Authorization Expiration Date:  12/31/2022  Medication: ARIPiprazole (ABILIFY) 5 MG tablet - APPEAL APPROVED  Approved Dose/Quantity: CHANGE TO TIER 2  Reference #:     Insurance Company: [employer] - Phone [phone] Fax [phone]  Expected CoPay:       CoPay Card Available: No    Foundation Assistance Needed:    Which Pharmacy is filling the prescription (Not needed for infusion/clinic administered):    Pharmacy Notified: No  Patient Notified: No

## 2022-03-04 NOTE — TELEPHONE ENCOUNTER
Hi, I agree that she should discuss this with the prescribing physician.  Sincerely,  Dr. Nayana Wheat MD  3/4/2022

## 2022-03-07 ENCOUNTER — OFFICE VISIT (OUTPATIENT)
Dept: OPHTHALMOLOGY | Facility: CLINIC | Age: 67
End: 2022-03-07
Attending: OPHTHALMOLOGY
Payer: MEDICARE

## 2022-03-07 DIAGNOSIS — H52.13 HIGH MYOPIA, BILATERAL: Primary | ICD-10-CM

## 2022-03-07 PROCEDURE — 92250 FUNDUS PHOTOGRAPHY W/I&R: CPT | Performed by: OPHTHALMOLOGY

## 2022-03-07 PROCEDURE — 92012 INTRM OPH EXAM EST PATIENT: CPT | Mod: GC | Performed by: OPHTHALMOLOGY

## 2022-03-07 PROCEDURE — G0463 HOSPITAL OUTPT CLINIC VISIT: HCPCS

## 2022-03-07 ASSESSMENT — VISUAL ACUITY
OD_CC+: +2
OS_CC+: -2
OS_CC: 20/40
CORRECTION_TYPE: GLASSES
METHOD: SNELLEN - LINEAR
OD_CC: 20/30

## 2022-03-07 ASSESSMENT — TONOMETRY
OS_IOP_MMHG: 17
IOP_METHOD: TONOPEN
OD_IOP_MMHG: 16

## 2022-03-07 ASSESSMENT — PACHYMETRY
OS_CT(UM): 570
OD_CT(UM): 539

## 2022-03-07 ASSESSMENT — REFRACTION_WEARINGRX
OS_ADD: +2.50
OS_CYLINDER: +1.25
OD_CYLINDER: +1.00
OD_AXIS: 021
OD_SPHERE: -5.00
OD_ADD: +2.50
OS_SPHERE: -6.25
OS_AXIS: 151
SPECS_TYPE: SVL

## 2022-03-07 ASSESSMENT — SLIT LAMP EXAM - LIDS
COMMENTS: NORMAL
COMMENTS: NORMAL

## 2022-03-07 ASSESSMENT — CONF VISUAL FIELD
METHOD: COUNTING FINGERS
OS_NORMAL: 1
OD_NORMAL: 1

## 2022-03-07 ASSESSMENT — EXTERNAL EXAM - LEFT EYE: OS_EXAM: NORMAL

## 2022-03-07 ASSESSMENT — EXTERNAL EXAM - RIGHT EYE: OD_EXAM: NORMAL

## 2022-03-07 NOTE — PROGRESS NOTES
CC: retina and glaucoma consult    HPI: Emperatriz Esteban is a  66 year old year-old patient with history of glaucoma and cataract surgery.  Moved from [place] 2.5 months ago and she is here to establish eye care. Reports chronic progressive decreased vision both eyes. Quite driving in may because of difficulties driving at night. Also resport chronic poor night vision. 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 History of injections x 3 2016 and 2017      Retinal Imaging:    Optos Fundus 3/7/2022  Right Eye - clear media, dense lens phymosis obscuring peripheral view, PPA, flat macula, peripheral laser  Left eye - clear media, peripheral laser, PPA, flat macula     Autofluorescence: 03/07/22  PP Autofluorescence and peripheral areas of hypoautofluorescence     OCT MAC  11/22/21   Right eye: Epiretinal membrane with lamellar hole stable   Left eye: thin retina with mild irregularity of the retina surface    ONFL 07/26/21   RE: inf thinning   LE: inferior thinning left eye worse than right eye     OVF 24-2   Right eye: non reliable; scatter spots of decreased sensitivity. MD -1.8  Left eye: sup arcuate MD -12.7    Assessment & Plan:    # history of myopic degeneration  - Shallow posterior staphylomas both eyes  - Retina detachment precautions were discussed with the patient (presence or increased in flashes, floaters or a curtain in the visual field) and was asked to return if any of the those occur     # Primary open angle glaucoma (POAG) both eyes    Advance left eye.   ONFL correlated thinning with arcuate visual field defect    Gonio: open 360  to CB both eyes   K pachy:  539/570  Asthma/COPD: No  Steroid Use: No    Kidney Stones: No    Sulfa Allergy:      No   Tmax: unknown  Today 16/17     # partial thickness Macula hole right eye   Stable - observe    # history of Dry eye syndrome   Patient complaining of dry eyes and pain after application of dilating eyedrops  -in the past used serum eyedrops. Currently uses it on and off.  - today punctate epithelial erosions.  - Patient states serum tears has worked well for her.   - per patient couldn't afford restasis.  - PFAT as needed    - warm compresses, lid hygiene, humidification and fish oil recommended  Recommend to follow up with cornea next available    # pseudophakic both eyes   Stable    # history of vitrectomy left eye   By Dr. Henok Bolton in [facility]    PLAN  - follow up in 3-4 months with ONFL, macula Optical Coherence Tomography and OVF24-2. No dilation   - PFAT as needed   - will alternate glaucoma testing if stable        Bozena Quiles MD  Ophthalmology Resident PGY3  [facility]     ~~~~~~~~~~~~~~~~~~~~~~~~~~~~~~~~~~   Complete documentation of historical and exam elements from today's encounter can be found in the full encounter summary report (not reduplicated in this progress note).  I personally obtained the chief complaint(s) and history of present illness.  I confirmed and edited as necessary the review of systems, past medical/surgical history, family history, social history, and examination findings as documented by others; and I examined the patient myself.  I personally reviewed the relevant tests, images, and reports as documented above.  I personally reviewed the ophthalmic test(s) associated with this encounter, agree with the interpretation(s) as documented by the resident/fellow, and have edited the corresponding report(s) as necessary.   I formulated and edited as necessary the assessment and plan and discussed the findings and management plan with the patient  and family    Cydney Montes MD   of Ophthalmology.  Retina Service   Department of Ophthalmology and Visual Neurosciences   [facility]  Phone: [phone]   Fax: [phone]

## 2022-03-20 ENCOUNTER — MYC MEDICAL ADVICE (OUTPATIENT)
Dept: FAMILY MEDICINE | Facility: CLINIC | Age: 67
End: 2022-03-20
Payer: MEDICARE

## 2022-03-22 NOTE — TELEPHONE ENCOUNTER
Writer responded via BPT.    ARLENE GambinoN, RN  [facility]

## 2022-03-28 ENCOUNTER — OFFICE VISIT (OUTPATIENT)
Dept: URGENT CARE | Facility: URGENT CARE | Age: 67
End: 2022-03-28
Payer: MEDICARE

## 2022-03-28 VITALS
RESPIRATION RATE: 15 BRPM | TEMPERATURE: 98.2 F | HEART RATE: 62 BPM | SYSTOLIC BLOOD PRESSURE: 112 MMHG | HEIGHT: 66 IN | WEIGHT: 155 LBS | OXYGEN SATURATION: 98 % | DIASTOLIC BLOOD PRESSURE: 64 MMHG | BODY MASS INDEX: 24.91 KG/M2

## 2022-03-28 DIAGNOSIS — L02.31 ABSCESS OF BUTTOCK: Primary | ICD-10-CM

## 2022-03-28 PROCEDURE — 99213 OFFICE O/P EST LOW 20 MIN: CPT | Performed by: PHYSICIAN ASSISTANT

## 2022-03-28 RX ORDER — SULFAMETHOXAZOLE/TRIMETHOPRIM 800-160 MG
1 TABLET ORAL 2 TIMES DAILY
Qty: 20 TABLET | Refills: 0 | Status: SHIPPED | OUTPATIENT
Start: 2022-03-28 | End: 2022-04-07

## 2022-03-28 RX ORDER — MUPIROCIN 20 MG/G
OINTMENT TOPICAL 3 TIMES DAILY
Qty: 30 G | Refills: 0 | Status: SHIPPED | OUTPATIENT
Start: 2022-03-28 | End: 2022-10-10

## 2022-03-28 NOTE — PROGRESS NOTES
Abscess of buttock  - sulfamethoxazole-trimethoprim (BACTRIM DS) 800-160 MG tablet; Take 1 tablet by mouth 2 times daily for 10 days  - mupirocin (BACTROBAN) 2 % external ointment; Apply topically 3 times daily     See Patient Instructions  Patient Instructions     Patient Education     Abscess (Antibiotic Treatment Only)  An abscess happens when bacteria get trapped under the skin and start to grow. Pus forms inside the abscess as the body responds to the bacteria. An abscess can happen with an insect bite, ingrown hair, blocked oil gland, pimple, cyst, or puncture wound. It is sometimes call a boil.   In the early stages, your wound may be red and tender. For this stage, you may get antibiotics. If the abscess does not get better with antibiotics, it will need to be drained with a small cut.   Home care  These tips will help you care for your abscess at home:    Soak the wound in hot water or apply hot packs (small towel soaked in hot water) to the area for 20 minutes at a time. Do this 3 to 4 times a day, or as instructed. Use a new towel each time. Wash the towels afterward because they may be contaminated with bacteria after use.    Don't cut, squeeze, or pop the boil yourself.    Put antibiotic cream or ointment on the skin 3 to 4 times a day, unless something else was prescribed. Some ointments include an antibiotic plus a pain reliever.    If your healthcare provider prescribed antibiotics, don't stop taking them until you have finished the medicine or you are told to stop.    You may use an over-the-counter pain medicine to control pain, unless another pain medicine was prescribed. Talk with your provider before taking these medicines if you have chronic liver or kidney disease or ever had a stomach ulcer or digestive bleeding.  Follow-up care  Follow up with your healthcare provider, or as advised. Check your wound each day for the signs that the infection may be getting worse (see below).   When to seek  "medical advice  Get prompt medical attention if any of these occur:    An increase in redness or swelling    Red streaks in the skin leading away from the abscess    An increase in local pain or swelling    Fever of 100.4 F (38 C) or higher, or as directed by your healthcare provider    Pus or fluid coming from the abscess    Boil returns after getting better  [place] last reviewed this educational content on 8/1/2019 2000-2021 [employer]. All rights reserved. This information is not intended as a substitute for professional medical care. Always follow your healthcare professional's instructions.               SHENA [facility]    Subjective   67 year old who presents to clinic today for the following health issues:    Urgent Care and Derm Problem       HPI     Cyst near genital area x 3 days. Patient states that the cyst formed about 3 days ago and has been gradually worsening. She states that it is smaller than a marble. Its between the vulva and the anus. Patient states that she felt a little \"wonky\" last night but denies fever, chills, or fatigue. Patient states that it has been draining blood but has not noticed any pus coming out of it..     Review of Systems   Review of Systems   See HPI     Objective    Temp: 98.2  F (36.8  C) Temp src: Temporal BP: 112/64 Pulse: 62   Resp: 15 SpO2: 98 %       Physical Exam   Physical Exam  Constitutional:       General: She is not in acute distress.     Appearance: Normal appearance. She is normal weight. She is not ill-appearing, toxic-appearing or diaphoretic.   HENT:      Head: Normocephalic and atraumatic.   Cardiovascular:      Rate and Rhythm: Normal rate.      Pulses: Normal pulses.   Pulmonary:      Effort: Pulmonary effort is normal. No respiratory distress.   Genitourinary:         Comments: There is a small 2 cm draining abscess in the area shown above. It's tender to touch, warm, and " erythematous.  Neurological:      General: No focal deficit present.      Mental Status: She is alert and oriented to person, place, and time. Mental status is at baseline.      Gait: Gait normal.   Psychiatric:         Mood and Affect: Mood normal.         Behavior: Behavior normal.         Thought Content: Thought content normal.         Judgment: Judgment normal.          No results found for this or any previous visit (from the past 24 hour(s)).

## 2022-03-31 NOTE — PATIENT INSTRUCTIONS
Abilify discontinued once you start lurasidone    Lurasidone 20 mg daily with food    Continue Lamictal 300 mg daily    Continue trazodone 100 mg at bedtime    Talk therapy when able to to learn ways to cope with life stressors        Continue all other medications as reviewed per electronic medical record today.     Safety plan reviewed. To the Emergency Department as needed or call after hours crisis line at [phone] or [phone]. [place] Crisis Text Line. Text [place] to 003865 or Suicide LifeLine Chat: suicideJanrain.org/chat/    To schedule individual or family therapy, call [facility] at [phone].    Schedule an appointment with me in 6 weeks or sooner as needed. Call [facility] at [phone] to schedule.    Follow up with primary care provider as planned or for acute medical concerns.    Call the psychiatric nurse line with medication questions or concerns at [phone].    Melodeohart may be used to communicate with your provider, but this is not intended to be used for emergencies.    Crisis Resources:    National Suicide Prevention Lifeline: 420.587.4972 (TTY: 568.163.7370). Call anytime for help.  (www.suicidepreventionlifeline.org)  National [place] on Mental Illness (www.rey.org): 686.341.9326 or 341-036-4356.   Mental Health Association (www.mentalhealth.org): 822.474.9498 or 767-534-6196.  [place] Crisis Text Line: Text [place] to 793617  Suicide LifeLine Chat: suicideJanrain.org/chat

## 2022-04-20 ENCOUNTER — OFFICE VISIT (OUTPATIENT)
Dept: OPHTHALMOLOGY | Facility: CLINIC | Age: 67
End: 2022-04-20
Attending: OPHTHALMOLOGY
Payer: MEDICARE

## 2022-04-20 DIAGNOSIS — H04.123 DRY EYES, BILATERAL: Primary | ICD-10-CM

## 2022-04-20 PROCEDURE — 99215 OFFICE O/P EST HI 40 MIN: CPT | Mod: GC | Performed by: OPHTHALMOLOGY

## 2022-04-20 PROCEDURE — G0463 HOSPITAL OUTPT CLINIC VISIT: HCPCS

## 2022-04-20 ASSESSMENT — CONF VISUAL FIELD
OS_NORMAL: 1
METHOD: COUNTING FINGERS
OD_NORMAL: 1

## 2022-04-20 ASSESSMENT — REFRACTION_WEARINGRX
OD_AXIS: 021
OD_SPHERE: -5.00
OS_AXIS: 151
OS_ADD: +2.50
SPECS_TYPE: SVL
OD_CYLINDER: +1.00
OS_SPHERE: -6.25
OS_CYLINDER: +1.25
OD_ADD: +2.50

## 2022-04-20 ASSESSMENT — TONOMETRY
IOP_METHOD: TONOPEN
OS_IOP_MMHG: 14
OD_IOP_MMHG: 13

## 2022-04-20 ASSESSMENT — SLIT LAMP EXAM - LIDS
COMMENTS: NORMAL
COMMENTS: NORMAL

## 2022-04-20 ASSESSMENT — VISUAL ACUITY
OS_CC: 20/30
OS_CC+: -1
OD_CC: 20/25
OD_CC+: -2
METHOD: SNELLEN - LINEAR

## 2022-04-20 ASSESSMENT — EXTERNAL EXAM - LEFT EYE: OS_EXAM: NORMAL

## 2022-04-20 ASSESSMENT — EXTERNAL EXAM - RIGHT EYE: OD_EXAM: NORMAL

## 2022-04-20 NOTE — PROGRESS NOTES
CC: Dry Eye each eye     Referring Provider: Dr ANTHONY Montes    HPI:  Emperatriz Esteban is a  66 year old year-old patient with history of glaucoma and cataract surgery.  Moved from [place] May 2021 and she is here to establish eye care. Reports chronic progressive decreased vision both eyes.  She is referred to us from Dr. Montes for evaluation of her dry eyes.  She has not been doing eyelid hygiene.  She has used serum tears in the past and found them helpful.  She has not used them since 2021.  She uses Refresh Tremayne intermittently.  She has had punctal plugs in the past that were dissolvable which were helpful.     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 History of injections x 3 2016 and 2017    Last eye exam: 3/7/22    Prior eye surgery/laser:   CEIOL (1990s) each eye  Vitrectomy right eye   SLT each eye (2019)    CTL wearer: prior    Glasses: yes    Family Hx of eye disease: Father (glaucoma)    Gtts Currenly Taking:  Cosopt BID each eye   Brimonidine BID left eye   PFAT as needed    warm compresses, lid hygiene, humidification and fish oil recommended    Allergies:  Prednisone - raises eye pressure    Assessment:  # history of Dry eye syndrome   Patient complaining of dry eyes and pain after application of dilating eyedrops  - in the past used serum eyedrops.   - Hx punctate epithelial erosions.  - Patient states serum tears has worked well for her.   - Will restart serum tears QID OU - will order from serum tears  - WC BID OU  - start omega-3 2000mg po daily  - Discussed eyelid hygiene measures  - if no improvement, consider punctal plugs    # history of myopic degeneration  - Shallow posterior staphylomas both eyes  - Retina detachment precautions were discussed with the  patient (presence or increased in flashes, floaters or a curtain in the visual field) and was asked to return if any of the those occur      # Primary open angle glaucoma (POAG) both eyes    Advance left eye.   ONFL correlated thinning with arcuate visual field defect    Gonio: open 360 to CB both eyes   K pachy:  539/570  Asthma/COPD: No  Steroid Use: No    Kidney Stones: No    Sulfa Allergy:      No   Tmax: unknown  Today 16/17      # partial thickness Macula hole right eye   Stable - observe     # pseudophakic both eyes   Stable     # history of vitrectomy left eye   By Dr. Henok Bolton in [facility]    Follow up Cornea:  6 months alternating with Retina w/ V,T at next visit, call if problems sooner to clinic.    Retina follow up in 3-4 months with ONFL, macula Optical Coherence Tomography and OVF24-2. No dilation   - will alternate glaucoma testing if stable    ALSO SEES:  Dr ANTHONY Montes.    Renzo Pastor,   Fellow, Cornea & External Disease  Department of Ophthalmology  [facility]    Attending Physician Attestation:  Complete documentation of historical and exam elements from today's encounter can be found in the full encounter summary report (not reduplicated in this progress note).  I personally obtained the chief complaint(s) and history of present illness.  I confirmed and edited as necessary the review of systems, past medical/surgical history, family history, social history, and examination findings as documented by others; and I examined the patient myself.  I personally reviewed the relevant tests, images, and reports as documented above.  I formulated and edited as necessary the assessment and plan and discussed the findings and management plan with the patient and family. - Lul Burden MD    I personally spent great than 40min with the patient, of which >50% of the time was spent face to face with the patient, counseling and coordinating care with the patient. We discussed the  complexity of her diagnosis, the need for further information prior to proceeding with yet another surgery, and the unknown prognosis for the patient at this time.    Lul Burden MD

## 2022-04-20 NOTE — PATIENT INSTRUCTIONS
Eyelid Hygiene:    -Use a heatable eye mask or dry rice in a sock for 5 minutes  -Then gently massage with your fingers (not rubbing)  -Then use baby shampoo to do a gentle eyelid scrub    Medications:    Serum tears 4 times daily in both eyes  Omega-3 fish oil (pure EPA) 2000mg po daily    Use Refresh Tremayne 4 times daily until the serum tears are available and as needed while on the computer/reading/television watching, etc

## 2022-04-20 NOTE — NURSING NOTE
Chief Complaints and History of Present Illnesses   Patient presents with     Dry Eye Syndrome Evaluation     Chief Complaint(s) and History of Present Illness(es)     Dry Eye Syndrome Evaluation     Laterality: both eyes    Associated symptoms: dryness.  Negative for mattering, itching and photophobia    Pain scale: 0/10              Comments     Eulalia is here referred by Dr Montes for AZEEM, BE. She was told her eyes are dry, although she says they seem fine.     Antoine Licea COT 12:16 PM April 20, 2022

## 2022-04-27 ENCOUNTER — VIRTUAL VISIT (OUTPATIENT)
Dept: PSYCHIATRY | Facility: CLINIC | Age: 67
End: 2022-04-27
Payer: MEDICARE

## 2022-04-27 DIAGNOSIS — F41.1 GAD (GENERALIZED ANXIETY DISORDER): ICD-10-CM

## 2022-04-27 DIAGNOSIS — F99 INSOMNIA DUE TO OTHER MENTAL DISORDER: ICD-10-CM

## 2022-04-27 DIAGNOSIS — F51.05 INSOMNIA DUE TO OTHER MENTAL DISORDER: ICD-10-CM

## 2022-04-27 DIAGNOSIS — F31.31 BIPOLAR AFFECTIVE DISORDER, CURRENTLY DEPRESSED, MILD (H): Primary | ICD-10-CM

## 2022-04-27 PROCEDURE — 99214 OFFICE O/P EST MOD 30 MIN: CPT | Mod: 95 | Performed by: NURSE PRACTITIONER

## 2022-04-27 RX ORDER — LAMOTRIGINE 150 MG/1
TABLET ORAL
Qty: 180 TABLET | Refills: 0 | Status: SHIPPED | OUTPATIENT
Start: 2022-04-27 | End: 2022-10-10

## 2022-04-27 RX ORDER — ARIPIPRAZOLE 5 MG/1
5 TABLET ORAL DAILY
Qty: 90 TABLET | Refills: 0 | Status: SHIPPED | OUTPATIENT
Start: 2022-04-27 | End: 2022-07-11

## 2022-04-27 RX ORDER — BUSPIRONE HYDROCHLORIDE 5 MG/1
5 TABLET ORAL 2 TIMES DAILY
Qty: 180 TABLET | Refills: 0 | Status: SHIPPED | OUTPATIENT
Start: 2022-04-27 | End: 2022-10-10

## 2022-04-27 NOTE — PROGRESS NOTES
"Eulalia is a 67 year old who is being evaluated via a billable video visit.      How would you like to obtain your AVS? MyChart  If the video visit is dropped, the invitation should be resent by: Send to e-mail at: [email or web address]  Will anyone else be joining your video visit? No      Video Start Time: 1:24 PM  Video-Visit Details    Type of service:  Video Visit    Video End Time:1:41 PM    Originating Location (pt. Location): Home    Distant Location (provider location):  [facility]     Platform used for Video Visit: [facility]              Outpatient Psychiatric Progress Note    Name: Emperatriz Esteban   : 1955                    Primary Care Provider: Nayana Wheat MD   Therapist: None    PHQ-9 scores:  PHQ-9 SCORE 2022   PHQ-9 Total Score MyChart - 3 (Minimal depression)   PHQ-9 Total Score 3 3       VAL-7 scores:  No flowsheet data found.    Patient Identification:    Patient is a 67 year old year old, single  White Not  or  female  who presents for return visit with me.  Patient is currently unemployed. Patient attended the session alone. Patient prefers to be called: \"Eulalia\".    Current medications include: ARIPiprazole (ABILIFY) 5 MG tablet, Take 1 tablet (5 mg) by mouth daily  aspirin 81 MG EC tablet, Take 81 mg by mouth daily   atorvastatin (LIPITOR) 20 MG tablet, Take 1 tablet (20 mg) by mouth daily  brimonidine (ALPHAGAN) 0.2 % ophthalmic solution, Place 1 drop Into the left eye 2 times daily  dorzolamide-timolol (COSOPT) 2-0.5 % ophthalmic solution, Place 1 drop into both eyes 2 times daily  lamoTRIgine (LAMICTAL) 150 MG tablet, TAKE TWO TABLETS BY MOUTH DAILY.  levothyroxine (SYNTHROID, LEVOTHROID) 25 MCG tablet, Take 1 tablet (25 mcg) by mouth daily  montelukast (SINGULAIR) 10 MG tablet, Take 1 tablet (10 mg) by mouth At Bedtime  Multiple Minerals-Vitamins (ADVANCED CALCIUM/D/MAGNESIUM) TABS, Take 1,500 mg by " mouth daily   multivitamin (OCUVITE) TABS, Take 1 tablet by mouth daily   mupirocin (BACTROBAN) 2 % external ointment, Apply topically 3 times daily  omega 3 1000 MG CAPS, Take 3 g by mouth daily   omeprazole (PRILOSEC) 40 MG DR capsule, Take 1 capsule (40 mg) by mouth daily Take 30-60 minutes before a meal.  traZODone (DESYREL) 100 MG tablet, Take 1 tablet (100 mg) by mouth nightly as needed for sleep    No current facility-administered medications on file prior to visit.       The [place] Prescription Monitoring Program has been reviewed and there are no concerns about diversionary activity for controlled substances at this time.      I was able to review most recent Primary Care Provider, specialty provider, and therapy visit notes that I have access to.     Today, patient reports that she has to move her cousin to assisted living.  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  Overall she admits to enjoying life and has hobbies.  She told me about a friend who walks her dog with her.    Eulalia has a goal of walking up to 5 miles every day for exercise.  Mood swings still could occur, but she is able to manage them.  Other than some back pain, she reports no other physical symptoms.     has a past medical history of Bipolar 1 disorder (H).    Social history updates:    She lives alone with her dog.  When her cousin moved to assisted living it is within walking distance of her residence.    Substance use updates:    Social alcohol use  Tobacco use: No    Vital Signs:   There were no vitals taken for this visit.    Labs:    Most recent laboratory results reviewed and no new labs.     Mental Status Examination:  Appearance: awake, alert and casual dress  Attitude: cooperative  Eye Contact:  adequate  Gait and Station: No assistive Devices used and No dizziness or falls  Psychomotor Behavior:  intact station, gait and muscle tone  Oriented to:  time, person,  and place  Attention Span and Concentration:  Normal  Speech:   clear, coherent and Speaks: English  Mood:  anxious, depressed and better  Affect:  mood congruent  Associations:  no loose associations  Thought Process:  goal oriented  Thought Content:  no evidence of suicidal ideation or homicidal ideation, no auditory hallucinations present and no visual hallucinations present  Recent and Remote Memory:  intact Not formally assessed. No amnesia.  Fund of Knowledge: appropriate  Insight:  good  Judgment:  intact  Impulse Control:  intact    Suicide Risk Assessment:  Today Emperatriz Esteban reports no thoughts to want to end her life or to harm other people. In addition, there are notable risk factors for self-harm, including anxiety. However, risk is mitigated by commitment to family, history of seeking help when needed, future oriented, denies suicidal intent or plan and denies homicidal ideation, intent, or plan. Therefore, based on all available evidence including the factors cited above, Emperatriz Esteban does not appear to be at imminent risk for self-harm, does not meet criteria for a 72-hr hold, and therefore remains appropriate for ongoing outpatient level of care.  A thorough assessment of risk factors related to suicide and self-harm have been reviewed and are noted above. The patient convincingly denies suicidality on several occasions. Local community safety resources printed and reviewed for patient to use if needed. There was no deceit detected, and the patient presented in a manner that was believable.     DSM5 Diagnosis:  296.89 Bipolar II Disorder With mixed features and mild  300.02 (F41.1) Generalized Anxiety Disorder  780.52 (G47.00) Insomnia Disorder   With non-sleep disorder mental comorbidity  Episodic      Medical comorbidities include:   Patient Active Problem List    Diagnosis Date Noted     Impaired fasting glucose 12/16/2021     Priority: Medium     Dry mouth 12/16/2021     Priority: Medium  "    Prediabetes 09/02/2016     Priority: Medium     Gastroesophageal reflux disease without esophagitis 09/01/2016     Priority: Medium     Chronic rhinitis 09/01/2016     Priority: Medium     Takes singulair       Glaucoma suspect, unspecified laterality 09/01/2016     Priority: Medium     Borderline personality disorder (H) 09/01/2016     Priority: Medium     Follows with psychiatry in [place], [place] (in [place] for a few months)       Bipolar affective disorder (H) 09/01/2016     Priority: Medium     Follows with psychiatry in [place], [place] (in [place] for a few months)         Other specified hypothyroidism 09/01/2016     Priority: Medium     Other insomnia 09/01/2016     Priority: Medium       Assessment:    Emperatriz Esteban met with me to review her medications.  She wants to stay on the Abilify as when she does not take it, she reports increased irritability and \"not feeling good\".  Buspirone was really added to her schedule at 5 mg twice daily as needed for breakthrough anxiety.  She is getting ready to move her cousin to an assisted living facility which has heightened her symptoms.  Lamotrigine will continue at 300 mg at bedtime for mood regulation.  With her bipolar diagnosis,Eulalia reports some mild mood dysregulation but feels it is manageable at this point.  She reports no sleep disturbance and reported no self-harm thoughts..    Medication side effects and alternatives were reviewed. Health promotion activities recommended and reviewed today. All questions addressed. Education and counseling completed regarding risks and benefits of medications and psychotherapy options.    Treatment Plan:        1.  Buspirone 5 mg twice daily as needed for anxiety    2.  Continue Abilify 5 mg daily    3.  Continue lamotrigine 300 mg at bedtime    4.  Talk therapy when needed and able to in order to process life stressors        Continue all other medications as reviewed per electronic medical record today.     Safety plan reviewed. To " the Emergency Department as needed or call after hours crisis line at [phone] or [phone]. [place] Crisis [place]. Text [place] to 456501 or Suicide LifeLine Chat: suicideHepa Wash.org/chat/    To schedule individual or family therapy, call [facility] at [phone]    Schedule an appointment with me in 3 months or sooner as needed. Call [facility] at [phone] to schedule.    Follow up with primary care provider as planned or for acute medical concerns.    Call the psychiatric nurse line with medication questions or concerns at [phone]    MyChart may be used to communicate with your provider, but this is not intended to be used for emergencies.    Crisis Resources:    National Suicide Prevention Lifeline: 899.317.6349 (TTY: 574.899.4285). Call anytime for help.  (www.suicidepreventionlifeline.org)  National [place] on Mental Illness (www.rey.org): 724.387.1874 or 621-696-9722.   Mental Health Association (www.mentalhealth.org): 747.861.7493 or 353-773-4613.  [place] Crisis [place]: Text [place] to 682410  Suicide LifeLine Chat: suicideHepa [place].org/chat    Administrative Billing:   Time spent with patient includes counseling and coordination of care regarding above diagnoses and treatment plan.    Patient Status:  Patient will continue to be seen for ongoing consultation and stabilization.    Signed:   LGORIA Mims-BC   Psychiatry

## 2022-04-27 NOTE — PATIENT INSTRUCTIONS
1.  Buspirone 5 mg twice daily as needed for anxiety  2.  Continue Abilify 5 mg daily  3.  Continue lamotrigine 300 mg at bedtime  4.  Talk therapy when needed and able to in order to process life stressors    Continue all other medications as reviewed per electronic medical record today.   Safety plan reviewed. To the Emergency Department as needed or call after hours crisis line at [phone] or [phone]. [place] Crisis [place]. Text [place] to 252093 or Suicide LifeLine Chat: suicideSemaConnect.org/chat/  To schedule individual or family therapy, call [facility] at [phone]  Schedule an appointment with me in 3 months or sooner as needed. Call [facility] at [phone] to schedule.  Follow up with primary care provider as planned or for acute medical concerns.  Call the psychiatric nurse line with medication questions or concerns at [phone]  MyChart may be used to communicate with your provider, but this is not intended to be used for emergencies.    Crisis Resources:    National Suicide Prevention Lifeline: 633.734.4785 (TTY: 983.572.6926). Call anytime for help.  (www.suicidepreventionlifeline.org)  National [place] on Mental Illness (www.rey.org): 447.405.2029 or 843-468-3184.   Mental Health Association (www.mentalhealth.org): 873.292.8001 or 146-574-8541.  [place] Crisis [place]: Text [place] to 887030  Suicide LifeLine Chat: suicideSemaConnect.org/chat

## 2022-06-05 ENCOUNTER — MYC MEDICAL ADVICE (OUTPATIENT)
Dept: FAMILY MEDICINE | Facility: CLINIC | Age: 67
End: 2022-06-05
Payer: MEDICARE

## 2022-06-05 DIAGNOSIS — R26.89 BALANCE PROBLEMS: Primary | ICD-10-CM

## 2022-06-08 NOTE — TELEPHONE ENCOUNTER
Pt having balance issues, would you want to refer to ENT?    Jesenia Murray, BSN RN  [facility]

## 2022-06-08 NOTE — TELEPHONE ENCOUNTER
Should see provider in the clinic to decide neuro, ent or ortho or other cause and referral as per assessment.

## 2022-06-09 NOTE — TELEPHONE ENCOUNTER
Writer responded via [employer].    ARLENE GambinoN, RN  [facility]

## 2022-06-28 ENCOUNTER — MYC MEDICAL ADVICE (OUTPATIENT)
Dept: FAMILY MEDICINE | Facility: CLINIC | Age: 67
End: 2022-06-28

## 2022-06-28 DIAGNOSIS — R73.03 PREDIABETES: Primary | ICD-10-CM

## 2022-06-29 ENCOUNTER — MYC MEDICAL ADVICE (OUTPATIENT)
Dept: FAMILY MEDICINE | Facility: CLINIC | Age: 67
End: 2022-06-29

## 2022-06-29 RX ORDER — ATORVASTATIN CALCIUM 20 MG/1
20 TABLET, FILM COATED ORAL DAILY
Qty: 90 TABLET | Refills: 0 | Status: SHIPPED | OUTPATIENT
Start: 2022-06-29 | End: 2022-09-22

## 2022-06-29 NOTE — TELEPHONE ENCOUNTER
Routing refill request to provider for review/approval because:  Medication is reported/historical    Last visit: 12/16/2021    Recent Labs   Lab Test 12/16/21  1417 09/01/16  0930   CHOL 185 168   HDL 80 74   LDL 93 78   TRIG 61 82

## 2022-07-08 DIAGNOSIS — H40.003 GLAUCOMA SUSPECT OF BOTH EYES: ICD-10-CM

## 2022-07-08 RX ORDER — BRIMONIDINE TARTRATE 2 MG/ML
1 SOLUTION/ DROPS OPHTHALMIC 2 TIMES DAILY
Qty: 10 ML | Refills: 3 | Status: SHIPPED | OUTPATIENT
Start: 2022-07-08 | End: 2022-09-12

## 2022-07-08 NOTE — TELEPHONE ENCOUNTER
BRIMONIDINE  SOL 0.2% OP  Last Written Prescription Date:   1/12/2022  Last Fill Quantity: 10,   # refills: 2  Last Office Visit :  4/20/2022  Future Office visit:   9/12/2022     Lul Burden MD    Ophthalmology       Gtts Curren Taking:  Cosopt BID each eye   Brimonidine BID left eye   PFAT as needed    warm compresses, lid hygiene, humidification and fish oil recommended     Allergies:  Prednisone - raises eye pressure     Assessment:  # history of Dry eye syndrome   Patient complaining of dry eyes and pain after application of dilating eyedrops  - in the past used serum eyedrops.   - Hx punctate epithelial erosions.  - Patient states serum tears has worked well for her.   - Will restart serum tears QID OU - will order from serum tears  - WC BID OU  - start omega-3 2000mg po daily  - Discussed eyelid hygiene measures  - if no improvement, consider punctal plugs     # history of myopic degeneration  - Shallow posterior staphylomas both eyes  - Retina detachment precautions were discussed with the patient (presence or increased in flashes, floaters or a curtain in the visual field) and was asked to return if any of the those occur      # Primary open angle glaucoma (POAG) both eyes    Advance left eye.   ONFL correlated thinning with arcuate visual field defect    Gonio: open 360 to CB both eyes   K pachy:  539/570  Asthma/COPD: No  Steroid Use: No    Kidney Stones: No    Sulfa Allergy:      No   Tmax: unknown  Today 16/17      # partial thickness Macula hole right eye   Stable - observe     # pseudophakic both eyes   Stable     # history of vitrectomy left eye   By Dr. Henok Bolton in [facility]     Follow up Cornea:  6 months alternating with Retina w/ V,T at next visit, call if problems sooner to clinic.     Retina follow up in 3-4 months with ONFL, macula Optical Coherence Tomography and OVF24-2. No dilation   - will alternate glaucoma testing if stable     ALSO SEES:  Dr ANTHONY Montes.     Renzo  DO Darby  Fellow, Cornea & External Disease  Department of Ophthalmology  [facility]    10 mL, 3 Refills sent to pharm       Mavis Murray RN  Central Triage Red Flags/Med Refills

## 2022-07-11 DIAGNOSIS — F31.31 BIPOLAR AFFECTIVE DISORDER, CURRENTLY DEPRESSED, MILD (H): ICD-10-CM

## 2022-07-11 RX ORDER — ARIPIPRAZOLE 5 MG/1
5 TABLET ORAL DAILY
Qty: 90 TABLET | Refills: 0 | Status: SHIPPED | OUTPATIENT
Start: 2022-07-11 | End: 2022-08-10

## 2022-07-11 NOTE — TELEPHONE ENCOUNTER
Reason for call:  Medication   If this is a refill request, has the caller requested the refill from the pharmacy already? Yes  Will the patient be using a [facility]? No  Name of the pharmacy and phone number for the current request: [facility]    Name of the medication requested: Abilify    Other request: [facility] pharmacy called with a refill request on behalf of the patient. [facility] number [phone] Option 2. Order# [id number]    Phone number to reach patient:  Home number on file [phone] (home)    Best Time:  ASAP    Can we leave a detailed message on this number?  YES    Travel screening: Not Applicable

## 2022-07-11 NOTE — TELEPHONE ENCOUNTER
Date of Last Office Visit: 4/27/2022  Date of Next Office Visit: none ((scheduling, please connect with patient and schedule follow up appointment with provider)  No shows since last visit: none  Cancellations since last visit: none    Medication requested: Abilify 5 mg tablet Date last ordered: 4/27/2022 Qty: 90 Refills: 0     Review of [place] ?: n/a    Lapse in medication adherence greater than 5 days?: no  If yes, call patient and gather details: no  Medication refill request verified as identical to current order?: yes  Result of Last DAM, VPA, Li+ Level, CBC, or Carbamazepine Level (at or since last visit): N/A    Last visit treatment plan: Treatment Plan:          1.  Buspirone 5 mg twice daily as needed for anxiety    2.  Continue Abilify 5 mg daily    3.  Continue lamotrigine 300 mg at bedtime    4.  Talk therapy when needed and able to in order to process life stressors         Continue all other medications as reviewed per electronic medical record today.     Safety plan reviewed. To the Emergency Department as needed or call after hours crisis line at [phone] or [phone]. [place] Crisis Text [place]. Text [place] to 768749 or Suicide LifeLine Chat: suicidepreventionlifeline.org/chat/    To schedule individual or family therapy, call [facility] at [phone]    Schedule an appointment with me in 3 months or sooner as needed. Call [facility] at [phone] to schedule.    Follow up with primary care provider as planned or for acute medical concerns.    Call the psychiatric nurse line with medication questions or concerns at [phone]    MyChart may be used to communicate with your provider, but this is not intended to be used for emergencies.      []Medication refilled per  Medication Refill in Ambulatory Care  policy.  [x]Medication unable to be refilled by RN due to criteria not met as indicated below:    []Eligibility - not seen in the last year   []Supervision -  no future appointment   []Compliance - no shows, cancellations or lapse in therapy   []Verification - order discrepancy   []Controlled medication   [x]Medication not included in policy   []90-day supply request   []Other

## 2022-07-19 ENCOUNTER — TELEPHONE (OUTPATIENT)
Dept: PSYCHIATRY | Facility: CLINIC | Age: 67
End: 2022-07-19

## 2022-07-19 NOTE — TELEPHONE ENCOUNTER
[facility] - [place], [place] - [address] AT Portal to Registered [facility] Sites  [phone]    Called requesting refill of Abilify 5mg.       7/19/22: left  to schedule follow up with Taj.

## 2022-08-23 ENCOUNTER — TELEPHONE (OUTPATIENT)
Dept: OPHTHALMOLOGY | Facility: CLINIC | Age: 67
End: 2022-08-23

## 2022-08-26 ENCOUNTER — MYC MEDICAL ADVICE (OUTPATIENT)
Dept: FAMILY MEDICINE | Facility: CLINIC | Age: 67
End: 2022-08-26

## 2022-08-30 NOTE — TELEPHONE ENCOUNTER
Dr Wheat,    Pt had medicare annual exam 12/16/21     Does she need another yearly physical? She is thinking she does and asking for lab orders up front as well as ok for virtual or in person    What do you advise?  Xi Reed RN

## 2022-09-01 NOTE — TELEPHONE ENCOUNTER
Health Maintenance Due   Topic Date Due     HEPATITIS B IMMUNIZATION (1 of 3 - Risk 3-dose series) Never done     ZOSTER IMMUNIZATION (1 of 2) 03/18/2004     DTAP/TDAP/TD IMMUNIZATION (2 - Td or Tdap) 06/26/2019     COVID-19 Vaccine (4 - Booster for Moderna series) 03/03/2022     INFLUENZA VACCINE (1) 09/01/2022      I sent the following my chart message. Nothing further to do. Note closed.  Sincerely,    Nayana Wheat MD   9/1/2022      Hi, you're actually up to date, except for some vaccines (shingles vaccine, tdap (tetanus) booster, flu shot and covid booster). You aren't due for a preventive visit until on or after December 17th. Insurance will not pay for a preventive physical unless it's been a full year since your last one.    I do recommend that you go ahead and schedule the physical since my schedule is filling up into December, and in person if you can.    You can certainly get the vaccines anytime, at any pharmacy, or with a vaccine appointment at clinic, or we can do them at your physical.    I hope this helps, please let me know if you have any other questions!    Sincerely,  Dr. Nayana Wheat MD  9/1/2022

## 2022-09-06 NOTE — TELEPHONE ENCOUNTER
Writer responded via [employer].    ARLENE GambinoN, RN  [facility]

## 2022-09-08 DIAGNOSIS — H35.371 EPIRETINAL MEMBRANE (ERM) OF RIGHT EYE: ICD-10-CM

## 2022-09-08 DIAGNOSIS — H40.1190 PRIMARY OPEN ANGLE GLAUCOMA: ICD-10-CM

## 2022-09-08 DIAGNOSIS — H40.003 GLAUCOMA SUSPECT OF BOTH EYES: Primary | ICD-10-CM

## 2022-09-11 ENCOUNTER — HEALTH MAINTENANCE LETTER (OUTPATIENT)
Age: 67
End: 2022-09-11

## 2022-09-12 ENCOUNTER — OFFICE VISIT (OUTPATIENT)
Dept: OPHTHALMOLOGY | Facility: CLINIC | Age: 67
End: 2022-09-12
Attending: OPHTHALMOLOGY
Payer: MEDICARE

## 2022-09-12 DIAGNOSIS — H35.371 EPIRETINAL MEMBRANE (ERM) OF RIGHT EYE: ICD-10-CM

## 2022-09-12 DIAGNOSIS — H40.003 GLAUCOMA SUSPECT OF BOTH EYES: ICD-10-CM

## 2022-09-12 PROCEDURE — 99214 OFFICE O/P EST MOD 30 MIN: CPT | Mod: GC | Performed by: OPHTHALMOLOGY

## 2022-09-12 PROCEDURE — 99207 OCT OPTIC NERVE RNFL SPECTRALIS OU (BOTH EYES): CPT | Mod: 26 | Performed by: OPHTHALMOLOGY

## 2022-09-12 PROCEDURE — 92134 CPTRZ OPH DX IMG PST SGM RTA: CPT | Performed by: OPHTHALMOLOGY

## 2022-09-12 PROCEDURE — 92083 EXTENDED VISUAL FIELD XM: CPT | Performed by: OPHTHALMOLOGY

## 2022-09-12 PROCEDURE — G0463 HOSPITAL OUTPT CLINIC VISIT: HCPCS | Mod: 25

## 2022-09-12 PROCEDURE — 92133 CPTRZD OPH DX IMG PST SGM ON: CPT | Performed by: OPHTHALMOLOGY

## 2022-09-12 RX ORDER — BRIMONIDINE TARTRATE 2 MG/ML
1 SOLUTION/ DROPS OPHTHALMIC 2 TIMES DAILY
Qty: 10 ML | Refills: 3 | Status: SHIPPED | OUTPATIENT
Start: 2022-09-12 | End: 2023-03-22

## 2022-09-12 ASSESSMENT — SLIT LAMP EXAM - LIDS
COMMENTS: NORMAL
COMMENTS: NORMAL

## 2022-09-12 ASSESSMENT — TONOMETRY
OS_IOP_MMHG: 17
OD_IOP_MMHG: 16
IOP_METHOD: TONOPEN
OS_IOP_MMHG: 17
IOP_METHOD: APPLANATE (AN)
OD_IOP_MMHG: 14

## 2022-09-12 ASSESSMENT — REFRACTION_WEARINGRX
OD_CYLINDER: +1.00
OS_SPHERE: -6.25
OS_ADD: +2.75
OD_SPHERE: -4.75
OD_ADD: +2.75
OD_AXIS: 174
OS_CYLINDER: +1.50
OS_AXIS: 159
SPECS_TYPE: PAL

## 2022-09-12 ASSESSMENT — CUP TO DISC RATIO
OD_RATIO: 0.3
OS_RATIO: 0.4

## 2022-09-12 ASSESSMENT — CONF VISUAL FIELD
OD_NORMAL: 1
OS_SUPERIOR_NASAL_RESTRICTION: 1
METHOD: COUNTING FINGERS

## 2022-09-12 ASSESSMENT — EXTERNAL EXAM - RIGHT EYE: OD_EXAM: NORMAL

## 2022-09-12 ASSESSMENT — VISUAL ACUITY
OS_CC: 20/30-/+
OD_CC: 20/20-
METHOD: SNELLEN - LINEAR

## 2022-09-12 ASSESSMENT — EXTERNAL EXAM - LEFT EYE: OS_EXAM: NORMAL

## 2022-09-12 NOTE — PROGRESS NOTES
CC: retina and glaucoma consult    HPI: Emperatriz Esteban is a  67 year old year-old patient with history of glaucoma and cataract surgery.  Moved from [place] 2.5 months ago and she is here to establish eye care. Reports chronic progressive decreased vision both eyes. Quite driving in may because of difficulties driving at night. Also resport chronic poor night vision. Has history of Dry eye syndrome and uses serum eyedrops     Current Drops: took this morning   Cosopt BID each eye   Brimonidine BID left eye     Past ocular history:  History of myopia age 6 with CL   1997 cataract surgery both eyes   In the past 10 ys diagnosed with glaucoma: cosopt twice a day both eyes and brimonidine twice a day left eye   2016 diagnosis of myopic degeneration - followed by Henok MaloneretnaTavo Santos (glaucoma). History of injections x 3 2016 and 2017    Retinal Imaging:  ONFL 09/12/22   RE: unable today    LE: inferior temporal thinning stable from 6/2021    [place]  Right eye: non reliable; scatter spots of decreased sensitivity. MD -6.1  Left eye: sup arcuate MD -14.9; non reliable  Will repeat next follow up     OCT MAC 09/12/22   Right eye: Epiretinal membrane with lamellar hole stable   Left eye: thin retina with mild irregularity of the retina surface    Optos Fundus 3/7/2022  Right Eye - clear media, dense lens phymosis obscuring peripheral view, PPA, flat macula, peripheral laser  Left eye - clear media, peripheral laser, PPA, flat macula     Autofluorescence: 03/07/22  PP Autofluorescence and peripheral areas of hypoautofluorescence       Assessment & Plan:    # history of myopic degeneration  - Shallow posterior staphylomas both eyes  - Retina detachment precautions were discussed with the patient (presence or increased in flashes, floaters or a curtain in the visual field) and was asked to return if any of the those occur     # Primary open angle glaucoma (POAG) both eyes    Advance left eye.   ONFL correlated thinning  with arcuate visual field defect    Gonio: open 360 to CB both eyes   K pachy:  539/570  Asthma/COPD: No  Steroid Use: No    Kidney Stones: No    Sulfa Allergy:      No   Tmax: unknown  Today 14/17  - GTOP in future as pt has anxiety with VF testing and reports less anxiety with GTOP    # partial thickness Macula hole right eye   Stable - observe    # history of Dry eye syndrome   Patient complaining of dry eyes and pain after application of dilating eyedrops  -in the past used serum eyedrops. Currently uses it on and off.  - today punctate epithelial erosions.  - Patient states serum tears has worked well for her.   - per patient couldn't afford restasis.  - she's now on serum eye drops (seen by Dr. Burden  - warm compresses, lid hygiene, humidification and fish oil recommended  - Seeing Dr. Ryder 10/24    # pseudophakic both eyes   Stable    # history of vitrectomy left eye   - floaterectomy  By Dr. Henok Bolton in [facility]    PLAN  - follow up in 6 months with VF 24-2; dilate and Optical Coherence Tomography   - PFAT as needed   - will alternate glaucoma testing if stable          ~~~~~~~~~~~~~~~~~~~~~~~~~~~~~~~~~~   Complete documentation of historical and exam elements from today's encounter can be found in the full encounter summary report (not reduplicated in this progress note).  I personally obtained the chief complaint(s) and history of present illness.  I confirmed and edited as necessary the review of systems, past medical/surgical history, family history, social history, and examination findings as documented by others; and I examined the patient myself.  I personally reviewed the relevant tests, images, and reports as documented above.  I personally reviewed the ophthalmic test(s) associated with this encounter, agree with the interpretation(s) as documented by the resident/fellow, and have edited the corresponding report(s) as necessary.   I formulated and edited as necessary the assessment and plan  and discussed the findings and management plan with the patient and family  Camron Soria MD  Resident Physician, PGY-3  Department of Ophthalmology  09/12/22 2:46 PM      Cydney Montes MD   of Ophthalmology.  Retina Service   Department of Ophthalmology and Visual Neurosciences   [facility]  Phone: [phone]   Fax: [phone]

## 2022-09-20 DIAGNOSIS — R73.03 PREDIABETES: ICD-10-CM

## 2022-09-21 ENCOUNTER — MYC MEDICAL ADVICE (OUTPATIENT)
Dept: FAMILY MEDICINE | Facility: CLINIC | Age: 67
End: 2022-09-21

## 2022-09-21 DIAGNOSIS — R73.03 PREDIABETES: ICD-10-CM

## 2022-09-22 RX ORDER — ATORVASTATIN CALCIUM 20 MG/1
20 TABLET, FILM COATED ORAL DAILY
Qty: 90 TABLET | Refills: 0 | Status: SHIPPED | OUTPATIENT
Start: 2022-09-22 | End: 2022-12-19

## 2022-09-22 RX ORDER — ATORVASTATIN CALCIUM 20 MG/1
TABLET, FILM COATED ORAL
Qty: 90 TABLET | Refills: 0 | Status: SHIPPED | OUTPATIENT
Start: 2022-09-22 | End: 2022-09-22

## 2022-09-22 NOTE — TELEPHONE ENCOUNTER
Writer responded via [email or web address].    ARLENE GambinoN, RN  [facility]

## 2022-09-22 NOTE — TELEPHONE ENCOUNTER
Prescription approved per [facility] Refill Protocol.  ARLENE GambinoN, RN  [facility]

## 2022-10-10 ENCOUNTER — VIRTUAL VISIT (OUTPATIENT)
Dept: PSYCHIATRY | Facility: CLINIC | Age: 67
End: 2022-10-10
Payer: MEDICARE

## 2022-10-10 ENCOUNTER — VIRTUAL VISIT (OUTPATIENT)
Dept: BEHAVIORAL HEALTH | Facility: CLINIC | Age: 67
End: 2022-10-10
Payer: MEDICARE

## 2022-10-10 DIAGNOSIS — F31.31 BIPOLAR AFFECTIVE DISORDER, CURRENTLY DEPRESSED, MILD (H): Primary | ICD-10-CM

## 2022-10-10 DIAGNOSIS — F51.05 INSOMNIA DUE TO OTHER MENTAL DISORDER: ICD-10-CM

## 2022-10-10 DIAGNOSIS — F41.1 GAD (GENERALIZED ANXIETY DISORDER): ICD-10-CM

## 2022-10-10 DIAGNOSIS — F99 INSOMNIA DUE TO OTHER MENTAL DISORDER: ICD-10-CM

## 2022-10-10 PROCEDURE — 90832 PSYTX W PT 30 MINUTES: CPT | Mod: 95 | Performed by: COUNSELOR

## 2022-10-10 PROCEDURE — 99214 OFFICE O/P EST MOD 30 MIN: CPT | Mod: 95 | Performed by: NURSE PRACTITIONER

## 2022-10-10 RX ORDER — ARIPIPRAZOLE 5 MG/1
5 TABLET ORAL DAILY
Qty: 90 TABLET | Refills: 0 | Status: SHIPPED | OUTPATIENT
Start: 2022-10-10 | End: 2022-10-10

## 2022-10-10 RX ORDER — LAMOTRIGINE 150 MG/1
TABLET ORAL
Qty: 180 TABLET | Refills: 0 | Status: SHIPPED | OUTPATIENT
Start: 2022-10-10 | End: 2022-12-12

## 2022-10-10 RX ORDER — ARIPIPRAZOLE 5 MG/1
5 TABLET ORAL DAILY
Qty: 90 TABLET | Refills: 0 | Status: SHIPPED | OUTPATIENT
Start: 2022-10-10 | End: 2022-12-12

## 2022-10-10 NOTE — PROGRESS NOTES
"Eulalia is a 67 year old who is being evaluated via a billable video visit.      How would you like to obtain your AVS? MyChart  If the video visit is dropped, the invitation should be resent by: Text to cell phone: [phone]  Will anyone else be joining your video visit? No    Emerald Logan [facility]F    Video-Visit Details    Video Start Time: 8:53 AM    Type of service:  Video Visit    Video End Time:9:21 AM    Originating Location (pt. Location): Home    Distant Location (provider location):  [facility]     Platform used for Video Visit: [facility]              Outpatient Psychiatric Progress Note    Name: Emperatriz Esteban   : 1955                    Primary Care Provider: Nayana Wheat MD   Therapist: None    PHQ-9 scores:  PHQ-9 SCORE 2022   PHQ-9 Total Score MyChart - 3 (Minimal depression)   PHQ-9 Total Score 3 3       VAL-7 scores:  No flowsheet data found.    Patient Identification:    Patient is a 67 year old year old, single  White Not  or  female  who presents for return visit with me.  Patient is currently unemployed. Patient attended the session alone. Patient prefers to be called: \"Eulalia\".    Current medications include: ARIPiprazole (ABILIFY) 5 MG tablet, Take 1 tablet (5 mg) by mouth daily  aspirin 81 MG EC tablet, Take 81 mg by mouth daily   atorvastatin (LIPITOR) 20 MG tablet, Take 1 tablet (20 mg) by mouth daily  brimonidine (ALPHAGAN) 0.2 % ophthalmic solution, Place 1 drop Into the left eye 2 times daily  dorzolamide-timolol (COSOPT) 2-0.5 % ophthalmic solution, Place 1 drop into both eyes 2 times daily  lamoTRIgine (LAMICTAL) 150 MG tablet, TAKE TWO TABLETS BY MOUTH DAILY.  levothyroxine (SYNTHROID, LEVOTHROID) 25 MCG tablet, Take 1 tablet (25 mcg) by mouth daily  montelukast (SINGULAIR) 10 MG tablet, Take 1 tablet (10 mg) by mouth At Bedtime  Multiple Minerals-Vitamins (ADVANCED CALCIUM/D/MAGNESIUM) TABS, Take " 1,500 mg by mouth daily   multivitamin (OCUVITE) TABS, Take 1 tablet by mouth daily   omega 3 1000 MG CAPS, Take 3 g by mouth daily   traZODone (DESYREL) 100 MG tablet, Take 1 tablet (100 mg) by mouth nightly as needed for sleep  busPIRone (BUSPAR) 5 MG tablet, Take 1 tablet (5 mg) by mouth 2 times daily As needed for breakthrough anxiety (Patient not taking: Reported on 10/10/2022)  mupirocin (BACTROBAN) 2 % external ointment, Apply topically 3 times daily (Patient not taking: Reported on 10/10/2022)  omeprazole (PRILOSEC) 40 MG DR capsule, TAKE 1 CAPSULE DAILY 30-60 MINUTES BEFORE A MEAL (Patient not taking: Reported on 10/10/2022)    No current facility-administered medications on file prior to visit.       The [place] Prescription Monitoring Program has been reviewed and there are no concerns about diversionary activity for controlled substances at this time.      I was able to review most recent Primary Care Provider, specialty provider, and therapy visit notes that I have access to.   She has tried  Today, patient reports that her cousin was transferred to an assisted living facility.  Now  Eulalia is more time in her day for herself.  She has back pain.  She is involved in her apartment committees and she stays involved.  No sleep concerns - She has gained  weight over time.  She is on weight watchers.  .  She drinks fluids.  Showers are hard- balance issues.  She has had some mood swings but they are manageable.  She tried an online therapist.  She would rather wait for  [facility] therapist. A tier exemption is needed for the Abilify in January- she has tried risperidone, quetiapine, latuda (too expensive).  She will look into obtaining paperwork for the clinic to complete.  For now she is purchasing the medication through [facility]     has a past medical history of Bipolar 1 disorder (H).    Social history updates:    She does not have a car.       Substance use updates:    No alcohol use reported  Tobacco use:  No    Vital Signs:   There were no vitals taken for this visit.    Labs:    Most recent laboratory results reviewed and no new labs.     Mental Status Examination:  Appearance: awake, alert  Attitude: cooperative  Eye Contact:  Unable to assess  Gait and Station: No assistive Devices used and No dizziness or falls  Psychomotor Behavior:  Unable to assess  Oriented to:  time, person, and place  Attention Span and Concentration:  Normal  Speech:   clear, coherent and Speaks: English  Mood:  anxious, depressed and better  Affect:  mood congruent  Associations:  no loose associations  Thought Process:  goal oriented  Thought Content:  no evidence of suicidal ideation or homicidal ideation, no auditory hallucinations present and no visual hallucinations present  Recent and Remote Memory:  intact Not formally assessed. No amnesia.  Fund of Knowledge: appropriate  Insight:  good  Judgment:  intact  Impulse Control:  intact    Suicide Risk Assessment:  Today Emperatriz Esteban reports no thoughts to harm themself or others. In addition, there are notable risk factors for self-harm, including anxiety and mood change. However, risk is mitigated by commitment to family, history of seeking help when needed, future oriented, denies suicidal intent or plan and denies homicidal ideation, intent, or plan. Therefore, based on all available evidence including the factors cited above, Emperatriz Esteban does not appear to be at imminent risk for self-harm, does not meet criteria for a 72-hr hold, and therefore remains appropriate for ongoing outpatient level of care.  A thorough assessment of risk factors related to suicide and self-harm have been reviewed and are noted above. The patient convincingly denies suicidality on several occasions. Local community safety resources printed and reviewed for patient to use if needed. There was no deceit detected, and the patient presented in a manner that was believable.     DSM5 Diagnosis:  296.89  Bipolar II Disorder Depressed and mild  300.02 (F41.1) Generalized Anxiety Disorder  780.52 (G47.00) Insomnia Disorder   With non-sleep disorder mental comorbidity  Episodic      Medical comorbidities include:   Patient Active Problem List    Diagnosis Date Noted     Impaired fasting glucose 12/16/2021     Priority: Medium     Dry mouth 12/16/2021     Priority: Medium     Prediabetes 09/02/2016     Priority: Medium     Gastroesophageal reflux disease without esophagitis 09/01/2016     Priority: Medium     Chronic rhinitis 09/01/2016     Priority: Medium     Takes singulair       Glaucoma suspect, unspecified laterality 09/01/2016     Priority: Medium     Borderline personality disorder (H) 09/01/2016     Priority: Medium     Follows with psychiatry in [place], [place] (in [place] for a few months)       Bipolar affective disorder (H) 09/01/2016     Priority: Medium     Follows with psychiatry in [place], [place] (in [place] for a few months)         Other specified hypothyroidism 09/01/2016     Priority: Medium     Other insomnia 09/01/2016     Priority: Medium       Assessment:    Emperatriz Esteban reported in today that her medications are managing her symptoms.  While she has some anxiety, depression, and mood swings, they are manageable.  Since helping her cousin move into an assisted living facility her stress level has decreased.  She discontinued the buspirone as it was not making any difference in how she was feeling.  She is trying to get a counselor and a referral has been made for her to obtain [facility] through [facility] rather than online.  Abilify and Lamictal will continue as prescribed.  She will contact us to submit paperwork for an exception on her insurance plan to pay for her Abilify in January.  For now she is purchasing it with [facility]..    Medication side effects and alternatives were reviewed. Health promotion activities recommended and reviewed today. All questions addressed. Education and counseling completed regarding  risks and benefits of medications and psychotherapy options.    Treatment Plan:        1.  Buspirone discontinued    2.  Abilify 5 mg daily    3.  Lamictal 300 mg daily    4.  Someone will contact you to connect you with a counselor    5.  Please send tier 3 exception paperwork for the Abilify to me when available-contact [phone] for information to get this sent to me        Continue all other medications as reviewed per electronic medical record today.     Safety plan reviewed. To the Emergency Department as needed or call after hours crisis line at [phone] or [phone]. [place] Crisis Text [place]. Text [place] to 996352 or Suicide LifeLine Chat: suicideVerold.org/chat/    To schedule individual or family therapy, call [facility] at [phone]    Schedule an appointment with me in December or sooner as needed. Call [facility] at [phone] to schedule.    Follow up with primary care provider as planned or for acute medical concerns.    Call the psychiatric nurse line with medication questions or concerns at [phone]    [employer] may be used to communicate with your provider, but this is not intended to be used for emergencies.    Crisis Resources:    National Suicide Prevention Lifeline: 362.369.9743 (TTY: 700.218.7497). Call anytime for help.  (www.suicidepreventionlifeline.org)  National [place] on Mental Illness (www.rey.org): 518.694.4103 or 377-946-6473.   Mental Health Association (www.mentalhealth.org): 517.623.1306 or 623-818-5419.  [place] Crisis Text [place]: Text [place] to 348475  Suicide LifeLine Chat: suicideVerold.org/chat    Administrative Billing:   Time spent with patient includes counseling and coordination of care regarding above diagnoses and treatment plan.    Patient Status:  Patient will continue to be seen for ongoing consultation and stabilization.    Signed:   GLORIA Mims-BC   Psychiatry

## 2022-10-10 NOTE — PATIENT INSTRUCTIONS
1.  Buspirone discontinued  2.  Abilify 5 mg daily  3.  Lamictal 300 mg daily  4.  Someone will contact you to connect you with a counselor  5.  Please send tier 3 exception paperwork for the Abilify to me when available-contact [phone] for information to get this sent to me    Continue all other medications as reviewed per electronic medical record today.   Safety plan reviewed. To the Emergency Department as needed or call after hours crisis line at [phone] or [phone]. [place] Crisis Text [place]. Text [place] to 444218 or Suicide LifeLine Chat: 1SDK.org/chat/  To schedule individual or family therapy, call [facility] at [phone]  Schedule an appointment with me in December or sooner as needed. Call [facility] at [phone] to schedule.  Follow up with primary care provider as planned or for acute medical concerns.  Call the psychiatric nurse line with medication questions or concerns at [phone]  MyChart may be used to communicate with your provider, but this is not intended to be used for emergencies.    Crisis Resources:    National Suicide Prevention Lifeline: 306.119.3706 (TTY: 702.616.6671). Call anytime for help.  (www.suicidepreventionlifeline.org)  National [place] on Mental Illness (www.rey.org): 383.852.9362 or 600-476-3306.   Mental Health Association (www.mentalhealth.org): 206.244.2684 or 256-284-2644.  [place] Crisis Text [place]: Text [place] to 327049  Suicide LifeLine Chat: suicideGenizon BioSciences.org/chat

## 2022-10-10 NOTE — PROGRESS NOTES
[facility]  October 10, 2022      Behavioral Health Clinician Progress Note    Patient Name: Emperatriz Esteban           Service Type:  Individual      Service Location:   MyChart / Email (patient reached)     Session Start Time: 820am  Session End Time: 840am      Session Length: 16 - 37      Attendees: Patient     Service Modality:  Video Visit:      Provider verified identity through the following two step process.  Patient provided:  Patient is known previously to provider and Patient was verified at admission/transfer    Telemedicine Visit: The patient's condition can be safely assessed and treated via synchronous audio and visual telemedicine encounter.      Reason for Telemedicine Visit: Services only offered telehealth    Originating Site (Patient Location): Patient's home    Distant Site (Provider Location): Provider Remote Setting- Home Office    Consent:  The patient/guardian has verbally consented to: the potential risks and benefits of telemedicine (video visit) versus in person care; bill my insurance or make self-payment for services provided; and responsibility for payment of non-covered services.     Patient would like the video invitation sent by:  My Chart    Mode of Communication:  Video Conference via [facility]    As the provider I attest to compliance with applicable laws and regulations related to telemedicine.    Visit Activities (Refresh list every visit): [facility] Only    Diagnostic Assessment Date: 11/15/2021  Treatment Plan Review Date: 12/10/2022  See Flowsheets for today's PHQ-9 and VAL-7 results  Previous PHQ-9:   PHQ-9 SCORE 2/14/2022 2/14/2022   PHQ-9 Total Score MyChart - 3 (Minimal depression)   PHQ-9 Total Score 3 3     Previous VAL-7: No flowsheet data found.    SAEED LEVEL:  No flowsheet data found.    DATA  Extended Session (60+ minutes): No  Interactive Complexity: No  Crisis: No  [facility] Patient: No    Treatment Objective(s)  "Addressed in This Session:  Target Behavior(s): disease management/lifestyle changes related to anxiety, feeling depressed and self-care    Depressed Mood: Decrease frequency and intensity of feeling down, depressed, hopeless  Improve diet, appetite, mindful eating, and / or meal planning  Feel less fidgety, restless or slow in daily activities / interpersonal interactions  Anxiety: will experience a reduction in anxiety, will develop more effective coping skills to manage anxiety symptoms and will increase ability to function adaptively    Current Stressors / Issues:   update: Pt states that it took some stress off for their cousin to move. Pt says that they are fielding calls and still taking care of their cousin. Pt is wanting a therapist to help them learn more skills. Pt states that jounaling use to help. Pt would like to find a therapist to reduce eating and to manage stress and anxiety.   Stressors: wife is under pressure and she lives in [place] and has a deadline so they can't talk much   Side Effects: none  Mood: \"in and out\"   Appetite: eating and gained weight   Sleep: unremarkable    Impulsive spending/spending sprees: none  Suicidality: Pt denies   Self-harm: Pt denies  Substance Use: drink occassionally   Caffeine: no change  Therapist: is not able to find a therapist and tried to find an online one and the therapist wasn't good   Interventions: [facility] will make a referral for therapy. [facility] encourages to journal, practice mindfulness skills while walking, eating, showering and listening to music           Progress on Treatment Objective(s) / Homework:  Satisfactory progress - ACTION (Actively working towards change); Intervened by reinforcing change plan / affirming steps taken    Motivational Interviewing    MI Intervention: Co-Developed Goal: reduce depression and anxiety symptoms, Expressed Empathy/Understanding, Supported Autonomy, Collaboration, Evocation, Permission to raise concern or advise, " Open-ended questions, Reflections: simple and complex, Change talk (evoked) and Reframe     Change Talk Expressed by the Patient: Need to change Committment to change Taking steps    Provider Response to Change Talk: E - Evoked more info from patient about behavior change, A - Affirmed patient's thoughts, decisions, or attempts at behavior change, R - Reflected patient's change talk and S - Summarized patient's change talk statements    Also provided psychoeducation about behavioral health condition, symptoms, and treatment options    Care Plan review completed: Yes    Medication Review:  No changes to current psychiatric medication(s)    Medication Compliance:  Yes    Changes in Health Issues:   Yes: haven't been able to walk very far- going to the chiropractor    Chemical Use Review:   Substance Use: Chemical use reviewed, no active concerns identified      Tobacco Use: No current tobacco use.      Assessment: Current Emotional / Mental Status (status of significant symptoms):  Risk status (Self / Other harm or suicidal ideation)  Patient has had a history of suicidal ideation: in the past and did not act on it  Patient denies current fears or concerns for personal safety.  Patient denies current or recent suicidal ideation or behaviors.  Patient denies current or recent homicidal ideation or behaviors.  Patient denies current or recent self injurious behavior or ideation.  Patient denies other safety concerns.  A safety and risk management plan has not been developed at this time, however patient was encouraged to call [place] [phone] should there be a change in any of these risk factors.    Appearance:   Appropriate   Eye Contact:   Good   Psychomotor Behavior: Normal   Attitude:   Cooperative   Orientation:   All  Speech   Rate / Production: Normal    Volume:  Normal   Mood:    Anxious  Sad   Affect:    Subdued  Worrisome   Thought Content:  Clear   Thought Form:  Coherent  Logical   Insight:    Good      Diagnoses:  1. Bipolar affective disorder, currently depressed, mild (H)        Collateral Reports Completed:  Communicated with:   [facility]    Plan: (Homework, other):  Patient was given information about behavioral services and encouraged to schedule a follow up appointment with the clinic [facility] as needed.  She was also given information about mental health symptoms and treatment options  and Cognitive Behavioral Therapy skills to practice when experiencing anxiety.  CD Recommendations: No indications of CD issues.  Ana Cardona, JOSH, [facility] 10/10/2022    ______________________________________________________________________    Integrated Primary Care Behavioral Health Treatment Plan    Patient's Name: Emperatriz Esteban  YOB: 1955    Date of Creation: 02/14/2022  Date Treatment Plan Last Reviewed/Revised: 10/10/2022    DSM5 Diagnoses: Bipolar affective disorder, currently depressed, mild   Psychosocial / Contextual Factors: taking care of cousin, has a dog, health concerns    PROMIS (reviewed every 90 days):   PROMIS 10 Global Mental Health Score Global Physical Health Score   4/27/2022 12 17     PROMIS 10 PROMIS TOTAL - SUBSCORES   4/27/2022 29       Referral / Collaboration:  Referral to another professional/service is not indicated at this time.    Anticipated number of session for this episode of care:4-7  Anticipation frequency of session: as determined by Malia  Anticipated Duration of each session: 16-37 minutes  Treatment plan will be reviewed in 90 days or when goals have been changed.       MeasurableTreatment Goal(s) related to diagnosis / functional impairment(s)  Goal 1: Patient will be referred to a therapist that meets their needs and is covered by their insurance.     I will know I've met my goal when I have a therapist that is covered by my insurance. I tried the other process and it was confusing, expensive and not a good  "fit.\"    Objective #A (Patient Action)                          Patient will [facility] will help patient get set-up with a therapist that meets their therapy needs and is covered by insurance.   Status: Continue - Date: 10/10/2022      Intervention(s)  [facility] will use resources to assist patient each meeting to locate a therapist covered by their insurance and inquire about how the patient is feeling about therapy and their therapist.         Ana Cardona, [facility]  October 10, 2022    "

## 2022-10-24 ENCOUNTER — OFFICE VISIT (OUTPATIENT)
Dept: OPHTHALMOLOGY | Facility: CLINIC | Age: 67
End: 2022-10-24
Attending: OPHTHALMOLOGY
Payer: MEDICARE

## 2022-10-24 DIAGNOSIS — H04.123 DRY EYES, BILATERAL: Primary | ICD-10-CM

## 2022-10-24 PROCEDURE — 99213 OFFICE O/P EST LOW 20 MIN: CPT | Performed by: OPHTHALMOLOGY

## 2022-10-24 PROCEDURE — G0463 HOSPITAL OUTPT CLINIC VISIT: HCPCS

## 2022-10-24 ASSESSMENT — CONF VISUAL FIELD
OD_SUPERIOR_TEMPORAL_RESTRICTION: 0
OS_INFERIOR_TEMPORAL_RESTRICTION: 0
OD_INFERIOR_TEMPORAL_RESTRICTION: 0
OD_SUPERIOR_NASAL_RESTRICTION: 0
OS_INFERIOR_NASAL_RESTRICTION: 0
OD_INFERIOR_NASAL_RESTRICTION: 0
OD_NORMAL: 1
OS_SUPERIOR_TEMPORAL_RESTRICTION: 0
OS_SUPERIOR_NASAL_RESTRICTION: 3

## 2022-10-24 ASSESSMENT — SLIT LAMP EXAM - LIDS
COMMENTS: NORMAL
COMMENTS: NORMAL

## 2022-10-24 ASSESSMENT — REFRACTION_WEARINGRX
OD_CYLINDER: +1.00
OD_ADD: +2.75
SPECS_TYPE: PAL
OS_SPHERE: -6.25
OD_SPHERE: -4.75
OS_ADD: +2.75
OS_AXIS: 159
OD_AXIS: 174
OS_CYLINDER: +1.50

## 2022-10-24 ASSESSMENT — VISUAL ACUITY
CORRECTION_TYPE: GLASSES
METHOD: SNELLEN - LINEAR
OS_CC+: -2
OS_CC: 20/40
OD_CC: 20/20

## 2022-10-24 ASSESSMENT — TONOMETRY
IOP_METHOD: ICARE
OS_IOP_MMHG: 15
OD_IOP_MMHG: 14

## 2022-10-24 ASSESSMENT — EXTERNAL EXAM - RIGHT EYE: OD_EXAM: NORMAL

## 2022-10-24 ASSESSMENT — EXTERNAL EXAM - LEFT EYE: OS_EXAM: NORMAL

## 2022-10-24 NOTE — NURSING NOTE
Chief Complaints and History of Present Illnesses   Patient presents with     Dry Eye Syndrome Follow Up     Chief Complaint(s) and History of Present Illness(es)     Dry Eye Syndrome Follow Up            Laterality: both eyes    Duration: 6 weeks          Comments    Pt. States that she I doing well. Dryness has improved with serum tears. No change in [facility] BEJean Carlos Storycy Silvestre COT 1:21 PM October 24, 2022

## 2022-10-24 NOTE — PROGRESS NOTES
CC: Dry Eye each eye     Referring Provider: Dr ANTHONY Montes    HPI:  Emperatriz Esteban is a  66 year old year-old patient with history of glaucoma and cataract surgery.  Moved from [place] May 2021 and she is here to establish eye care. Reports chronic progressive decreased vision both eyes.  She is referred to us from Dr. Montes for evaluation of her dry eyes.  She has not been doing eyelid hygiene.  She has used serum tears in the past and found them helpful.  She has not used them since 2021.  She uses Refresh Tremayne intermittently.  She has had punctal plugs in the past that were dissolvable which were helpful.     Interval hx    Pt. States that she I doing well. Dryness has improved with serum tears. No change in VA BE. Joana Rivers COT 1:21 PM October 24, 2022       POHx:  History of myopia age 6 with CL   1997 cataract surgery both eyes   In the past 10 ys diagnosed with glaucoma: cosopt twice a day both eyes and brimonidine twice a day left eye   2016 diagnosis of myopic degeneration - followed by Henok [place] ([place]) (glaucoma). History of injections x 3 2016 and 2017    Last eye exam: 3/7/22    Prior eye surgery/laser:   CEIOL (1990s) each eye  Vitrectomy right eye   SLT each eye (2019)    CTL wearer: prior    Glasses: yes    Family Hx of eye disease: Father (glaucoma)    Gtts Currenly Taking:  Cosopt BID each eye   Brimonidine BID left eye   PFAT as needed    warm compresses, lid hygiene, humidification and fish oil recommended    Allergies:  Prednisone - raises eye pressure    Assessment:  #Dry eye syndrome   Patient complaining of dry eyes and pain after application of dilating eyedrops  - had plugs inserted in the past, may consider them in case of worsening.   - Hx punctate epithelial erosions.  - Patient states serum tears has worked well for her.   - serum tears QID OU - patient is happy with the serum tears for now  - omega-3 2000mg po daily  - Discussed eyelid hygiene measures  -Keep eye drops  (AT), instructions for dry eye given    # history of myopic degeneration  - Shallow posterior staphylomas both eyes  - Retina detachment precautions were discussed with the patient (presence or increased in flashes, floaters or a curtain in the visual field) and was asked to return if any of the those occur      # Primary open angle glaucoma (POAG) both eyes    Advance left eye.   ONFL correlated thinning with arcuate visual field defect    Gonio: open 360 to CB both eyes   K pachy:  539/570  Asthma/COPD: No  Steroid Use: No    Kidney Stones: No    Sulfa Allergy:      No   Tmax: unknown  Today 16/17      # partial thickness Macula hole right eye   Stable - observe     # pseudophakic both eyes   Stable     # history of vitrectomy left eye   By Dr. Henok Bolton in [facility]    Follow up Cornea: 6 months  6 months alternating with Retina w/ V,T at next visit, call if problems sooner to clinic.    Retina follow up in 3-4 months with ONFL, macula Optical Coherence Tomography and OVF24-2. No dilation   - will alternate glaucoma testing if stable    ALSO SEES:  Dr ANTHONY Montes.  Attending Physician Attestation:  Complete documentation of historical and exam elements from today's encounter can be found in the full encounter summary report (not reduplicated in this progress note).  I personally obtained the chief complaint(s) and history of present illness.  I confirmed and edited as necessary the review of systems, past medical/surgical history, family history, social history, and examination findings as documented by others; and I examined the patient myself.  I personally reviewed the relevant tests, images, and reports as documented above.  I formulated and edited as necessary the assessment and plan and discussed the findings and management plan with the patient and family. - Jose Ryder MD

## 2022-12-05 ENCOUNTER — MYC MEDICAL ADVICE (OUTPATIENT)
Dept: OPHTHALMOLOGY | Facility: CLINIC | Age: 67
End: 2022-12-05

## 2022-12-05 DIAGNOSIS — H40.003 GLAUCOMA SUSPECT OF BOTH EYES: ICD-10-CM

## 2022-12-05 RX ORDER — DORZOLAMIDE HYDROCHLORIDE AND TIMOLOL MALEATE 20; 5 MG/ML; MG/ML
1 SOLUTION/ DROPS OPHTHALMIC 2 TIMES DAILY
Qty: 10 ML | Refills: 0 | Status: SHIPPED | OUTPATIENT
Start: 2022-12-05 | End: 2022-12-05

## 2022-12-05 RX ORDER — DORZOLAMIDE HYDROCHLORIDE AND TIMOLOL MALEATE 20; 5 MG/ML; MG/ML
1 SOLUTION/ DROPS OPHTHALMIC 2 TIMES DAILY
Qty: 10 ML | Refills: 3 | Status: SHIPPED | OUTPATIENT
Start: 2022-12-05 | End: 2023-01-24

## 2022-12-05 NOTE — TELEPHONE ENCOUNTER
Spoke with patient, sending 10mL dorz-timolol with zero refills to [facility] [place] and will also send refills to [facility].    Scripts are written same, for 10mL to be dispensed, but she only uses Brimonidine in one eye.  In the future we could write script to dispense 5mL Brimonidine and 10mL Dorzolamide-timolol so it would be more equal.     Betzaida Zamora, COT 5:51 PM 12/05/2022

## 2022-12-12 ENCOUNTER — VIRTUAL VISIT (OUTPATIENT)
Dept: PSYCHIATRY | Facility: CLINIC | Age: 67
End: 2022-12-12
Payer: MEDICARE

## 2022-12-12 DIAGNOSIS — F41.1 GAD (GENERALIZED ANXIETY DISORDER): ICD-10-CM

## 2022-12-12 DIAGNOSIS — F99 INSOMNIA DUE TO OTHER MENTAL DISORDER: ICD-10-CM

## 2022-12-12 DIAGNOSIS — F31.31 BIPOLAR AFFECTIVE DISORDER, CURRENTLY DEPRESSED, MILD (H): Primary | ICD-10-CM

## 2022-12-12 DIAGNOSIS — F51.05 INSOMNIA DUE TO OTHER MENTAL DISORDER: ICD-10-CM

## 2022-12-12 PROCEDURE — 99214 OFFICE O/P EST MOD 30 MIN: CPT | Mod: 95 | Performed by: NURSE PRACTITIONER

## 2022-12-12 RX ORDER — ARIPIPRAZOLE 5 MG/1
5 TABLET ORAL DAILY
Qty: 90 TABLET | Refills: 0 | Status: SHIPPED | OUTPATIENT
Start: 2022-12-12 | End: 2023-01-23

## 2022-12-12 RX ORDER — LAMOTRIGINE 150 MG/1
TABLET ORAL
Qty: 180 TABLET | Refills: 0 | Status: SHIPPED | OUTPATIENT
Start: 2022-12-12 | End: 2023-01-23

## 2022-12-12 ASSESSMENT — PATIENT HEALTH QUESTIONNAIRE - PHQ9
SUM OF ALL RESPONSES TO PHQ QUESTIONS 1-9: 3
SUM OF ALL RESPONSES TO PHQ QUESTIONS 1-9: 3

## 2022-12-12 NOTE — PROGRESS NOTES
"Eulalia is a 67 year old who is being evaluated via a billable video visit.      How would you like to obtain your AVS? MyChart  If the video visit is dropped, the invitation should be resent by: Send to e-mail at: [email or web address]  Will anyone else be joining your video visit? No        Video-Visit Details    Video Start Time: 11:32 AM    Type of service:  Video Visit    Video End Time:11:57 AM    Originating Location (pt. Location): Home        Distant Location (provider location):  Off-site    Platform used for Video Visit: [facility]         Outpatient Psychiatric Progress Note    Name: Emperatriz Esteban   : 1955                    Primary Care Provider: Nayana Wheat MD   Therapist: None    PHQ-9 scores:  PHQ-9 SCORE 2022   PHQ-9 Total Score MyChart - 3 (Minimal depression)   PHQ-9 Total Score 3 3       VAL-7 scores:  No flowsheet data found.    Patient Identification:    Patient is a 67 year old year old, single  White Not  or  female  who presents for return visit with me.  Patient is currently retired. Patient attended the session alone. Patient prefers to be called: \"Eulalia\".    Current medications include: ARIPiprazole (ABILIFY) 5 MG tablet, Take 1 tablet (5 mg) by mouth daily  aspirin 81 MG EC tablet, Take 81 mg by mouth daily   atorvastatin (LIPITOR) 20 MG tablet, Take 1 tablet (20 mg) by mouth daily  brimonidine (ALPHAGAN) 0.2 % ophthalmic solution, Place 1 drop Into the left eye 2 times daily  dorzolamide-timolol (COSOPT) 2-0.5 % ophthalmic solution, Place 1 drop into both eyes 2 times daily  lamoTRIgine (LAMICTAL) 150 MG tablet, TAKE TWO TABLETS BY MOUTH DAILY.  levothyroxine (SYNTHROID, LEVOTHROID) 25 MCG tablet, Take 1 tablet (25 mcg) by mouth daily  montelukast (SINGULAIR) 10 MG tablet, Take 1 tablet (10 mg) by mouth At Bedtime  Multiple Minerals-Vitamins (ADVANCED CALCIUM/D/MAGNESIUM) TABS, Take 1,500 mg by mouth daily   multivitamin (OCUVITE) TABS, Take 1 " tablet by mouth daily   omega 3 1000 MG CAPS, Take 3 g by mouth daily   traZODone (DESYREL) 100 MG tablet, Take 1 tablet (100 mg) by mouth nightly as needed for sleep    No current facility-administered medications on file prior to visit.       The [place] Prescription Monitoring Program has been reviewed and there are no concerns about diversionary activity for controlled substances at this time.      I was able to review most recent Primary Care Provider, specialty provider, and therapy visit notes that I have access to.     Today, patient reports that she has more anxiety.  No sleep concerns.  She has gained weight.  Emotional eating. 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  Her balance is not good.   She fell last week when taking her dog out for a walk.  She bruised her tail bone.  While she presented with some mild irritability, Eulalia reported that she is feeling stable.     has a past medical history of Bipolar 1 disorder (H).    Social history updates:    She lives alone with her dog.  Her children are identified as a support network for her.    Substance use updates:    No alcohol use reported  Tobacco use: No    Vital Signs:   There were no vitals taken for this visit.    Labs:    Most recent laboratory results reviewed and no new labs.     Mental Status Examination:  Appearance: awake, alert and casual dress  Attitude: cooperative  Eye Contact:  adequate  Gait and Station: No assistive Devices used and Falls  Psychomotor Behavior:  intact station, gait and muscle tone  Oriented to:  time, person, and place  Attention Span and Concentration:  Normal  Speech:   clear, coherent and Speaks: English  Mood:  anxious and depressed  Affect:  mood congruent  Associations:  no loose associations  Thought Process:  goal oriented  Thought Content:  no evidence of suicidal ideation or homicidal ideation, no auditory hallucinations present and no visual hallucinations present  Recent and Remote Memory:   intact Not formally assessed. No amnesia.  Fund of Knowledge: appropriate  Insight:  good  Judgment:  intact  Impulse Control:  intact    Suicide Risk Assessment:  Today Emperatriz Esteban reports no thoughts to harm themself or others. In addition, there are notable risk factors for self-harm, including single status, anxiety and mood change. However, risk is mitigated by commitment to family, history of seeking help when needed, future oriented, denies suicidal intent or plan and denies homicidal ideation, intent, or plan. Therefore, based on all available evidence including the factors cited above, Emperatriz Esteban does not appear to be at imminent risk for self-harm, does not meet criteria for a 72-hr hold, and therefore remains appropriate for ongoing outpatient level of care.  A thorough assessment of risk factors related to suicide and self-harm have been reviewed and are noted above. The patient convincingly denies suicidality on several occasions. Local community safety resources printed and reviewed for patient to use if needed. There was no deceit detected, and the patient presented in a manner that was believable.     DSM5 Diagnosis:  296.89 Bipolar II Disorder Depressed and mild  300.02 (F41.1) Generalized Anxiety Disorder  780.52 (G47.00) Insomnia Disorder   With non-sleep disorder mental comorbidity  Episodic      Medical comorbidities include:   Patient Active Problem List    Diagnosis Date Noted     Impaired fasting glucose 12/16/2021     Priority: Medium     Dry mouth 12/16/2021     Priority: Medium     Prediabetes 09/02/2016     Priority: Medium     Gastroesophageal reflux disease without esophagitis 09/01/2016     Priority: Medium     Chronic rhinitis 09/01/2016     Priority: Medium     Takes singulair       Glaucoma suspect, unspecified laterality 09/01/2016     Priority: Medium     Borderline personality disorder (H) 09/01/2016     Priority: Medium     Follows with psychiatry in [place], [place] (in [place]  for a few months)       Bipolar affective disorder (H) 09/01/2016     Priority: Medium     Follows with psychiatry in [place], [place] (in [place] for a few months)         Other specified hypothyroidism 09/01/2016     Priority: Medium     Other insomnia 09/01/2016     Priority: Medium       Assessment:    Emperatriz Esteban reported in today that things are relatively stable.  Recently she fell on the ice and bruised her tailbone.  She feels unbalanced on her feet.  She has returned from a [facility] and told me she had a good time there.   Eulalia feels overwhelmed, at times, when caring for her elderly family member.  Her children are supportive to her needs..  She denies any suicide thinking.  At this point she is very interested in receiving counseling and another referral was placed for someone to contact her to help her get this set up.  She takes trazodone to help her sleep at night occasionally.  At this point she would like to keep her Abilify and lamotrigine at their current doses.    Medication side effects and alternatives were reviewed. Health promotion activities recommended and reviewed today. All questions addressed. Education and counseling completed regarding risks and benefits of medications and psychotherapy options.    Treatment Plan:        1.  Counseling referral made and someone will contact you to set up a visit for learning ways to process and manage life stressors    2.  Abilify 5 mg daily    3.  Lamotrigine 300 mg daily    4.  Trazodone 100 mg at bedtime as needed for sleep-no refills requested today        Continue all other medications as reviewed per electronic medical record today.     Safety plan reviewed. To the Emergency Department as needed or call after hours crisis line at [phone] or [phone]. [place] Crisis Text [place]. Text [place] to 947799 or Suicide LifeLine Chat: suicidepreventionlifeline.org/chat/    To schedule individual or family therapy, call [facility] at  [phone]    Schedule an appointment with me in 2 months or sooner as needed. Call [facility] at [phone] to schedule.    Follow up with primary care provider as planned or for acute medical concerns.    Call the psychiatric nurse line with medication questions or concerns at [phone]    MyChart may be used to communicate with your provider, but this is not intended to be used for emergencies.    Crisis Resources:    National Suicide Prevention Lifeline: 979.501.7789 (TTY: 790.954.6488). Call anytime for help.  (www.suicidepreventionlifeline.org)  National [place] on Mental Illness (www.rey.org): 693.636.6240 or 390-264-0477.   Mental Health Association (www.mentalhealth.org): 712.486.6015 or 696-636-1064.  [place] Crisis Text [place]: Text [place] to 613407  Suicide LifeLine Chat: suicideTrustTeamline.org/chat    Administrative Billing:   Time spent with patient includes counseling and coordination of care regarding above diagnoses and treatment plan.    Patient Status:  Patient will continue to be seen for ongoing consultation and stabilization.    Signed:   GLORIA Mims-BC   Psychiatry         Answers for HPI/ROS submitted by the patient on 12/12/2022  PHQ9 TOTAL SCORE: 3

## 2022-12-12 NOTE — NURSING NOTE
Patient declined individual    medication review by support staff because she just reviewed them.  Oanh Bagley VF

## 2022-12-13 NOTE — PATIENT INSTRUCTIONS
1.  Counseling referral made and someone will contact you to set up a visit for learning ways to process and manage life stressors  2.  Abilify 5 mg daily  3.  Lamotrigine 300 mg daily  4.  Trazodone 100 mg at bedtime as needed for sleep-no refills requested today    Continue all other medications as reviewed per electronic medical record today.   Safety plan reviewed. To the Emergency Department as needed or call after hours crisis line at [phone] or [phone]. [place] Crisis Text Line. Text [place] to 178627 or Suicide LifeLine Chat: Sellbrite.org/chat/  To schedule individual or family therapy, call [facility] at [phone]  Schedule an appointment with me in 2 months or sooner as needed. Call [facility] at [phone] to schedule.  Follow up with primary care provider as planned or for acute medical concerns.  Call the psychiatric nurse line with medication questions or concerns at [phone]  MyChart may be used to communicate with your provider, but this is not intended to be used for emergencies.    Crisis Resources:    National Suicide Prevention Lifeline: 588.322.2963 (TTY: 993.529.8962). Call anytime for help.  (www.suicidepreventionlifeline.org)  National [place] on Mental Illness (www.rey.org): 361.901.7903 or 939-765-8469.   Mental Health Association (www.mentalhealth.org): 447.396.8286 or 672-470-2173.  [place] Crisis Text Line: Text [place] to 990760  Suicide LifeLine Chat: suicideRenal Ventures Management.org/chat

## 2022-12-18 DIAGNOSIS — R73.03 PREDIABETES: ICD-10-CM

## 2022-12-19 RX ORDER — ATORVASTATIN CALCIUM 20 MG/1
TABLET, FILM COATED ORAL
Qty: 90 TABLET | Refills: 0 | Status: SHIPPED | OUTPATIENT
Start: 2022-12-19 | End: 2023-02-27

## 2022-12-19 NOTE — TELEPHONE ENCOUNTER
---Prescription approved per [facility] Refill Protocol.       Ilana James RN BSN     [facility]        --Last visit:  12/16/2021     --Future Visit:   Next 5 appointments (look out 90 days)    Jan 05, 2023 10:50 AM  (Arrive by 10:30 AM)  Annual Wellness Visit with Nayana Wheat MD  [facility] ([facility] ) [address] [place] [address] [phone]

## 2022-12-20 ENCOUNTER — VIRTUAL VISIT (OUTPATIENT)
Dept: PSYCHOLOGY | Facility: CLINIC | Age: 67
End: 2022-12-20
Attending: NURSE PRACTITIONER
Payer: MEDICARE

## 2022-12-20 DIAGNOSIS — F31.31 BIPOLAR AFFECTIVE DISORDER, CURRENTLY DEPRESSED, MILD (H): ICD-10-CM

## 2022-12-20 PROCEDURE — 90837 PSYTX W PT 60 MINUTES: CPT | Mod: 95

## 2022-12-20 NOTE — PROGRESS NOTES
[facility]                                     Progress Note    Patient Name: Emperatriz Esteban  Date: 2022         Service Type: Individual      Session Start Time: 11:03 Session End Time: 11:47     Session Length: 45    Session #: 1    Attendees: Client attended alone    Service Modality:  Video Visit:      Provider verified identity through the following two step process.  Patient provided:  Patient  and Patient address    Telemedicine Visit: The patient's condition can be safely assessed and treated via synchronous audio and visual telemedicine encounter.      Reason for Telemedicine Visit: Patient has requested telehealth visit    Originating Site (Patient Location): Patient's home    Distant Site (Provider Location): Provider Remote Setting- Home Office    Consent:  The patient/guardian has verbally consented to: the potential risks and benefits of telemedicine (video visit) versus in person care; bill my insurance or make self-payment for services provided; and responsibility for payment of non-covered services.     Patient would like the video invitation sent by:  My Chart    Mode of Communication:  Video Conference via Amwell    Distant Location (Provider):  Off-site    As the provider I attest to compliance with applicable laws and regulations related to telemedicine.    DATA  Extended Session (53+ minutes): No  Interactive Complexity: No  Crisis: No        Progress Since Last Session (Related to Symptoms / Goals / Homework):   Symptoms: initial session     Homework: initial session      Episode of Care Goals: Minimal progress - PREPARATION (Decided to change - considering how); Intervened by negotiating a change plan and determining options / strategies for behavior change, identifying triggers, exploring social supports, and working towards setting a date to begin behavior change     Current / Ongoing Stressors and Concerns:   Increased anxiety, gaining weight with  emotional eating    has a past medical history of Bipolar 1 disorder (H).     Treatment Objective(s) Addressed in This Session:   Treatment planning   Rapport building   Provided psychoeducation on ACT therapy   Obtain informed consent for treatment      Intervention:   ACT- Met with patient to co develop goals and interventions. Reviewed previous diagnostic evaluation from 2021. Provided psychoeducation on ACT. Obtained informed consent for treatment. Continued rapport building. Discussed fusion. Taught labeling thought.     Assessments completed prior to visit:  The following assessments were completed by patient for this visit:  PHQ2:   PHQ-2 ( 1999 [employer]) 12/16/2021 8/13/2021 7/26/2021 9/1/2016   Q1: Little interest or pleasure in doing things 0 0 0 1   Q2: Feeling down, depressed or hopeless 1 0 0 1   PHQ-2 Score 1 0 0 2   PHQ-2 Total Score (12-17 Years)- Positive if 3 or more points; Administer PHQ-A if positive - 0 0 -   Q1: Little interest or pleasure in doing things Not at all - - -   Q2: Feeling down, depressed or hopeless Several days - - -   PHQ-2 Score 1 - - -     PHQ9:   PHQ-9 SCORE 2/14/2022 2/14/2022 12/12/2022   PHQ-9 Total Score MyChart - 3 (Minimal depression) 3 (Minimal depression)   PHQ-9 Total Score 3 3 3     GAD2:   VAL-2 4/27/2022 12/12/2022   Feeling nervous, anxious, or on edge 1 1   Not being able to stop or control worrying 1 1   VAL-2 Total Score 2 2     GAD7: No flowsheet data found.  PROMIS 10-Global Health (only subscores and total score):   PROMIS-10 Scores Only 4/27/2022 12/12/2022   Global Mental Health Score 12 14   Global Physical Health Score 17 17   PROMIS TOTAL - SUBSCORES 29 31         ASSESSMENT: Current Emotional / Mental Status (status of significant symptoms):   Risk status (Self / Other harm or suicidal ideation)   Patient denies current fears or concerns for personal safety.   Patient denies current or recent suicidal ideation or behaviors.   Patient denies current or  recent homicidal ideation or behaviors.   Patient denies current or recent self injurious behavior or ideation.   Patient denies other safety concerns.   Patient reports there has been no change in risk factors since their last session.     Patient reports there has been no change in protective factors since their last session.     A safety and risk management plan has been developed including: Patient consented to co-developed safety plan on 12/12/2022 with Malia Stern NP.  Safety and risk management plan was reviewed.   Patient agreed to use safety plan should any safety concerns arise.  A copy was made available to the patient.     Appearance:   Appropriate    Eye Contact:   Good    Psychomotor Behavior: Normal    Attitude:   Cooperative    Orientation:   All   Speech    Rate / Production: Normal     Volume:  Normal    Mood:    Normal   Affect:    Flat    Thought Content:  Clear    Thought Form:  Coherent  Logical    Insight:    Good      Medication Review:   No changes to current psychiatric medication(s)     Medication Compliance:   Yes     Changes in Health Issues:   None reported     Chemical Use Review:   Substance Use: increase in alcohol .  Patient reports frequency of use 2 drinks nightly.  Provided encouragement towards sobriety        Tobacco Use: No current tobacco use.      Diagnosis:  1. Bipolar affective disorder, currently depressed, mild (H)        Collateral Reports Completed:   Not Applicable    PLAN: (Patient Tasks / Therapist Tasks / Other)  Patient will practicing labeling thoughts.         Maria Del Carmen Leal [facility]     This note has been reviewed and I agree with the plan of care. This note is co-signed by Whit Miranda [facility] Supervisor, on: December 26, 2022                                                      ______________________________________________________________________    Individual Treatment Plan    Patient's Name: Emperatriz Esteban  YOB: 1955    Date of  Creation: 12/20/2022  Date Treatment Plan Last Reviewed/Revised: n/a    DSM5 Diagnoses:  Hx of 296.89 Bipolar II Disorder Depressed  Psychosocial / Contextual Factors: Past hx of SI  PROMIS (reviewed every 90 days): 12/12/2022 score 31    Referral / Collaboration:  Referral to another professional/service is not indicated at this time..    Anticipated number of session for this episode of care: 9-12 sessions  Anticipation frequency of session: Biweekly  Anticipated Duration of each session: 38-52 minutes  Treatment plan will be reviewed in 90 days or when goals have been changed.       MeasurableTreatment Goal(s) related to diagnosis / functional impairment(s)  Goal 1: Patient will learn coping skills to manage depression and anxiety.     I will know I've met my goal when I would my dog, Keya, more throughout the day.  unknown how much is more at this time    Objective #A (Patient Action)    Patient will use cognitive strategies identified in therapy to challenge anxious thoughts.  Status: New - Date: 12/20/2022     Intervention(s)  Therapist will introduce patient to ACT skills aimed to help manage depression and anxiety in healthy ways.    Objective #B  Patient will Increase interest, engagement, and pleasure in doing things  Decrease frequency and intensity of feeling down, depressed, hopeless  Improve diet, appetite, mindful eating, and / or meal planning  Identify negative self-talk and behaviors: challenge core beliefs, myths, and actions  Improve concentration, focus, and mindfulness in daily activities .  Status: New - Date: 12/20/2022     Intervention(s)  Therapist will help patient identify and manage negative self-talk and automatic thoughts; introduce patient to cognitive distortions; help patient develop cognitive diffusion techniques.    Patient has reviewed and agreed to the above plan.      Maria Del Carmen Leal, [facility]  December 20, 2022

## 2022-12-27 ENCOUNTER — VIRTUAL VISIT (OUTPATIENT)
Dept: PSYCHOLOGY | Facility: CLINIC | Age: 67
End: 2022-12-27
Payer: MEDICARE

## 2022-12-27 DIAGNOSIS — F31.31 BIPOLAR AFFECTIVE DISORDER, CURRENTLY DEPRESSED, MILD (H): Primary | ICD-10-CM

## 2022-12-27 PROCEDURE — 90785 PSYTX COMPLEX INTERACTIVE: CPT | Mod: 95

## 2022-12-27 PROCEDURE — 90834 PSYTX W PT 45 MINUTES: CPT | Mod: 95

## 2022-12-27 NOTE — PROGRESS NOTES
[facility]                                     Progress Note    Patient Name: Emperatriz Esteban  Date: 2022         Service Type: Individual      Session Start Time: 1:34 Session End Time: 2:16     Session Length: 45    Session #: 2    Attendees: Client attended alone    Service Modality:  Video Visit:      Provider verified identity through the following two step process.  Patient provided:  Patient  and Patient address    Telemedicine Visit: The patient's condition can be safely assessed and treated via synchronous audio and visual telemedicine encounter.      Reason for Telemedicine Visit: Patient has requested telehealth visit    Originating Site (Patient Location): Patient's home    Distant Site (Provider Location): Provider Remote Setting- Home Office    Consent:  The patient/guardian has verbally consented to: the potential risks and benefits of telemedicine (video visit) versus in person care; bill my insurance or make self-payment for services provided; and responsibility for payment of non-covered services.     Patient would like the video invitation sent by:  My Chart    Mode of Communication:  Video Conference via Amwell    Distant Location (Provider):  Off-site    As the provider I attest to compliance with applicable laws and regulations related to telemedicine.    DATA  Extended Session (53+ minutes): No  Interactive Complexity: Yes, visit entailed Interactive Complexity evidenced by:  -The need to manage maladaptive communication (related to, e.g., high anxiety, high reactivity, repeated questions, or disagreement) among participants that complicates delivery of care  Crisis: No        Progress Since Last Session (Related to Symptoms / Goals / Homework):   Symptoms: No change :    Homework: Achieved / completed to satisfaction      Episode of Care Goals: Minimal progress - PREPARATION (Decided to change - considering how); Intervened by negotiating a change plan and  determining options / strategies for behavior change, identifying triggers, exploring social supports, and working towards setting a date to begin behavior change     Current / Ongoing Stressors and Concerns:   Increased anxiety, gaining weight with emotional eating    has a past medical history of Bipolar 1 disorder (H).     Treatment Objective(s) Addressed in This Session:   use cognitive strategies identified in therapy to challenge anxious thoughts  Rapport building      Intervention:   ACT- Met with patient to review goals and interventions. Provided active listening as patient gave brief update on the week. Discussed labeling assignment from last session. Patient in disagreement with anxiety function and insisting all the sensations must go away. Completed acceptance exercise with patient.       Assessments completed prior to visit:  The following assessments were completed by patient for this visit:  PHQ2:   PHQ-2 ( 1999 [employer]) 12/16/2021 8/13/2021 7/26/2021 9/1/2016   Q1: Little interest or pleasure in doing things 0 0 0 1   Q2: Feeling down, depressed or hopeless 1 0 0 1   PHQ-2 Score 1 0 0 2   PHQ-2 Total Score (12-17 Years)- Positive if 3 or more points; Administer PHQ-A if positive - 0 0 -   Q1: Little interest or pleasure in doing things Not at all - - -   Q2: Feeling down, depressed or hopeless Several days - - -   PHQ-2 Score 1 - - -     PHQ9:   PHQ-9 SCORE 2/14/2022 2/14/2022 12/12/2022   PHQ-9 Total Score MyChart - 3 (Minimal depression) 3 (Minimal depression)   PHQ-9 Total Score 3 3 3     GAD2:   VAL-2 4/27/2022 12/12/2022   Feeling nervous, anxious, or on edge 1 1   Not being able to stop or control worrying 1 1   VAL-2 Total Score 2 2     GAD7: No flowsheet data found.  PROMIS 10-Global Health (only subscores and total score):   PROMIS-10 Scores Only 4/27/2022 12/12/2022   Global Mental Health Score 12 14   Global Physical Health Score 17 17   PROMIS TOTAL - SUBSCORES 29 31         ASSESSMENT:  Current Emotional / Mental Status (status of significant symptoms):   Risk status (Self / Other harm or suicidal ideation)   Patient denies current fears or concerns for personal safety.   Patient denies current or recent suicidal ideation or behaviors.   Patient denies current or recent homicidal ideation or behaviors.   Patient denies current or recent self injurious behavior or ideation.   Patient denies other safety concerns.   Patient reports there has been no change in risk factors since their last session.     Patient reports there has been no change in protective factors since their last session.     A safety and risk management plan has been developed including: Patient consented to co-developed safety plan on 12/12/2022 with Malia Stern NP.  Safety and risk management plan was reviewed.   Patient agreed to use safety plan should any safety concerns arise.  A copy was made available to the patient.     Appearance:   Appropriate    Eye Contact:   Good    Psychomotor Behavior: Normal    Attitude:   Cooperative  Guarded  Sandeep   Orientation:   All   Speech    Rate / Production: Emotional Normal     Volume:  Normal    Mood:    Anxious    Affect:    Flat    Thought Content:  Rumination    Thought Form:  Coherent  Circumstantial   Insight:    Fair      Medication Review:   No changes to current psychiatric medication(s)     Medication Compliance:   Yes     Changes in Health Issues:   None reported     Chemical Use Review:   Substance Use: Problem use continues with no change since last session, Stage of Change: Contemplation        Tobacco Use: No current tobacco use.      Diagnosis:  1. Bipolar affective disorder, currently depressed, mild (H)        Collateral Reports Completed:   Not Applicable    PLAN: (Patient Tasks / Therapist Tasks / Other)  Patient will practice acceptance.         Maria Del Carmen Leal, [facility]      This note has been reviewed and I agree with the plan of care. This note is co-signed by Whit  [facility] Supervisor, on: January 2, 2023                                                   ______________________________________________________________________    Individual Treatment Plan    Patient's Name: Emperatriz Esteban  YOB: 1955    Date of Creation: 12/20/2022  Date Treatment Plan Last Reviewed/Revised: n/a    DSM5 Diagnoses:  Hx of 296.89 Bipolar II Disorder Depressed  Psychosocial / Contextual Factors: Past hx of SI  PROMIS (reviewed every 90 days): 12/12/2022 score 31    Referral / Collaboration:  Referral to another professional/service is not indicated at this time..    Anticipated number of session for this episode of care: 9-12 sessions  Anticipation frequency of session: Biweekly  Anticipated Duration of each session: 38-52 minutes  Treatment plan will be reviewed in 90 days or when goals have been changed.       MeasurableTreatment Goal(s) related to diagnosis / functional impairment(s)  Goal 1: Patient will learn coping skills to manage depression and anxiety.     I will know I've met my goal when I walk my dog, Keya, more throughout the day.  unknown how much is more at this time    Objective #A (Patient Action)    Patient will use cognitive strategies identified in therapy to challenge anxious thoughts.  Status: New - Date: 12/20/2022     Intervention(s)  Therapist will introduce patient to ACT skills aimed to help manage depression and anxiety in healthy ways.    Objective #B  Patient will Increase interest, engagement, and pleasure in doing things  Decrease frequency and intensity of feeling down, depressed, hopeless  Improve diet, appetite, mindful eating, and / or meal planning  Identify negative self-talk and behaviors: challenge core beliefs, myths, and actions  Improve concentration, focus, and mindfulness in daily activities .  Status: New - Date: 12/20/2022     Intervention(s)  Therapist will help patient identify and manage negative self-talk and automatic  thoughts; introduce patient to cognitive distortions; help patient develop cognitive diffusion techniques.    Patient has reviewed and agreed to the above plan.      Maria Del Carmen Leal [facility]  December 20, 2022    This note has been reviewed and I agree with the plan of care. This note is co-signed by Whit Miranda [facility] Supervisor, on: January 2, 2023

## 2023-01-10 ENCOUNTER — VIRTUAL VISIT (OUTPATIENT)
Dept: PSYCHOLOGY | Facility: CLINIC | Age: 68
End: 2023-01-10
Payer: MEDICARE

## 2023-01-10 DIAGNOSIS — F31.31 BIPOLAR AFFECTIVE DISORDER, CURRENTLY DEPRESSED, MILD (H): Primary | ICD-10-CM

## 2023-01-10 PROCEDURE — 90834 PSYTX W PT 45 MINUTES: CPT | Mod: 95

## 2023-01-10 ASSESSMENT — ANXIETY QUESTIONNAIRES
6. BECOMING EASILY ANNOYED OR IRRITABLE: NOT AT ALL
1. FEELING NERVOUS, ANXIOUS, OR ON EDGE: MORE THAN HALF THE DAYS
3. WORRYING TOO MUCH ABOUT DIFFERENT THINGS: SEVERAL DAYS
GAD7 TOTAL SCORE: 9
5. BEING SO RESTLESS THAT IT IS HARD TO SIT STILL: NOT AT ALL
7. FEELING AFRAID AS IF SOMETHING AWFUL MIGHT HAPPEN: NEARLY EVERY DAY
2. NOT BEING ABLE TO STOP OR CONTROL WORRYING: MORE THAN HALF THE DAYS
GAD7 TOTAL SCORE: 9

## 2023-01-10 ASSESSMENT — PATIENT HEALTH QUESTIONNAIRE - PHQ9
5. POOR APPETITE OR OVEREATING: SEVERAL DAYS
SUM OF ALL RESPONSES TO PHQ QUESTIONS 1-9: 3

## 2023-01-10 NOTE — PROGRESS NOTES
[facility]                                     Progress Note    Patient Name: Emperatriz Esteban  Date: 1/10/2023         Service Type: Individual      Session Start Time: 1:33 Session End Time: 2:18     Session Length: 45    Session #: 3    Attendees: Client attended alone    Service Modality:  Video Visit:      Provider verified identity through the following two step process.  Patient provided:  Patient  and Patient address    Telemedicine Visit: The patient's condition can be safely assessed and treated via synchronous audio and visual telemedicine encounter.      Reason for Telemedicine Visit: Patient has requested telehealth visit    Originating Site (Patient Location): Patient's home    Distant Site (Provider Location): Provider Remote Setting- Home Office    Consent:  The patient/guardian has verbally consented to: the potential risks and benefits of telemedicine (video visit) versus in person care; bill my insurance or make self-payment for services provided; and responsibility for payment of non-covered services.     Patient would like the video invitation sent by:  My Chart    Mode of Communication:  Video Conference via Amwell    Distant Location (Provider):  Off-site    As the provider I attest to compliance with applicable laws and regulations related to telemedicine.    DATA  Extended Session (53+ minutes): No  Interactive Complexity: No  Crisis: No        Progress Since Last Session (Related to Symptoms / Goals / Homework):   Symptoms: No change poorly controlled anxiety     Homework: Achieved / completed to satisfaction      Episode of Care Goals: Minimal progress - PREPARATION (Decided to change - considering how); Intervened by negotiating a change plan and determining options / strategies for behavior change, identifying triggers, exploring social supports, and working towards setting a date to begin behavior change     Current / Ongoing Stressors and Concerns:   Increased  anxiety, gaining weight with emotional eating    has a past medical history of Bipolar 1 disorder (H).     Treatment Objective(s) Addressed in This Session:   use cognitive strategies identified in therapy to challenge anxious thoughts     Intervention:   CBT: - Met with patient to review goals and interventions. Provided active listening as patient gave brief update on the week. Patient processed anxiety taking dog out for daily walks. Provided psyhoeducation on anxiety. Therapist introduced visualization technique and positive thinking. Completed example in session. Patient reports  4 days per week.       Patient reports when feeling, anxious, lonely and sad she notices that she snacks. She is concerned about frequency of snacking. We discussed observing emotions and identified things to do instead of snacking including petting dog (Keya) and/or coloring.      Assessments completed prior to visit:  The following assessments were completed by patient for this visit:  PHQ2:   PHQ-2 ( 1999 [employer]) 12/16/2021 8/13/2021 7/26/2021 9/1/2016   Q1: Little interest or pleasure in doing things 0 0 0 1   Q2: Feeling down, depressed or hopeless 1 0 0 1   PHQ-2 Score 1 0 0 2   PHQ-2 Total Score (12-17 Years)- Positive if 3 or more points; Administer PHQ-A if positive - 0 0 -   Q1: Little interest or pleasure in doing things Not at all - - -   Q2: Feeling down, depressed or hopeless Several days - - -   PHQ-2 Score 1 - - -     PHQ9:   PHQ-9 SCORE 2/14/2022 2/14/2022 12/12/2022 1/10/2023   PHQ-9 Total Score MyChart - 3 (Minimal depression) 3 (Minimal depression) -   PHQ-9 Total Score 3 3 3 3     GAD2:   VAL-2 4/27/2022 12/12/2022   Feeling nervous, anxious, or on edge 1 1   Not being able to stop or control worrying 1 1   VAL-2 Total Score 2 2     GAD7:   VAL-7 SCORE 1/10/2023   Total Score 9     PROMIS 10-Global Health (only subscores and total score):   PROMIS-10 Scores Only 4/27/2022 12/12/2022   Global Mental  Health Score 12 14   Global Physical Health Score 17 17   PROMIS TOTAL - SUBSCORES 29 31         ASSESSMENT: Current Emotional / Mental Status (status of significant symptoms):   Risk status (Self / Other harm or suicidal ideation)   Patient reports the following current fears or concerns for personal safety: concerned she will fall while walking dog.   Patient denies current or recent suicidal ideation or behaviors.   Patient denies current or recent homicidal ideation or behaviors.   Patient denies current or recent self injurious behavior or ideation.   Patient denies other safety concerns.   Patient reports there has been no change in risk factors since their last session.     Patient reports there has been no change in protective factors since their last session.     A safety and risk management plan has been developed including: Patient consented to co-developed safety plan on 12/12/2022 with Malia Stern NP.  Safety and risk management plan was reviewed.   Patient agreed to use safety plan should any safety concerns arise.  A copy was made available to the patient.     Appearance:   Appropriate    Eye Contact:   Good    Psychomotor Behavior: Normal    Attitude:   Cooperative  Guarded  Sandeep   Orientation:   All   Speech    Rate / Production: Normal     Volume:  Normal    Mood:    Anxious    Affect:    Flat    Thought Content:  Rumination    Thought Form:  Coherent  Circumstantial   Insight:    Fair      Medication Review:   No changes to current psychiatric medication(s)      Medication Compliance:   Yes     Changes in Health Issues:   None reported     Chemical Use Review:   Substance Use: Problem use continues with no change since last session, Stage of Change: Contemplation        Tobacco Use: No current tobacco use.      Diagnosis:  1. Bipolar affective disorder, currently depressed, mild (H)        Collateral Reports Completed:   Not Applicable    PLAN: (Patient Tasks / Therapist Tasks / Other)  Patient  will practice visualization technique talking her dog Keya out for a walk and returning home safely. Patient will use positive thinking to combact negative intrusive thoughts.         Maria Del Carmen Leal [facility]       This note has been reviewed and I agree with the plan of care. This note is co-signed by Whit Miranda [facility] Supervisor, on: January 16, 2023                                                  ______________________________________________________________________    Individual Treatment Plan    Patient's Name: Emperatriz Esteban  YOB: 1955    Date of Creation: 12/20/2022  Date Treatment Plan Last Reviewed/Revised: n/a    DSM5 Diagnoses:  Hx of 296.89 Bipolar II Disorder Depressed  Psychosocial / Contextual Factors: Past hx of SI  PROMIS (reviewed every 90 days): 12/12/2022 score 31    Referral / Collaboration:  Referral to another professional/service is not indicated at this time..    Anticipated number of session for this episode of care: 9-12 sessions  Anticipation frequency of session: Biweekly  Anticipated Duration of each session: 38-52 minutes  Treatment plan will be reviewed in 90 days or when goals have been changed.       MeasurableTreatment Goal(s) related to diagnosis / functional impairment(s)  Goal 1: Patient will learn coping skills to manage depression and anxiety.     I will know I've met my goal when I walk my dog, Keya, more throughout the day.  unknown how much is more at this time    Objective #A (Patient Action)    Patient will use cognitive strategies identified in therapy to challenge anxious thoughts.  Status: New - Date: 12/20/2022     Intervention(s)  Therapist will introduce patient to ACT skills aimed to help manage depression and anxiety in healthy ways.    Objective #B  Patient will Increase interest, engagement, and pleasure in doing things  Decrease frequency and intensity of feeling down, depressed, hopeless  Improve diet, appetite, mindful eating, and  / or meal planning  Identify negative self-talk and behaviors: challenge core beliefs, myths, and actions  Improve concentration, focus, and mindfulness in daily activities .  Status: New - Date: 12/20/2022     Intervention(s)  Therapist will help patient identify and manage negative self-talk and automatic thoughts; introduce patient to cognitive distortions; help patient develop cognitive diffusion techniques.    Patient has reviewed and agreed to the above plan.      Maria Del Carmen Leal [facility]  December 20, 2022    This note has been reviewed and I agree with the plan of care. This note is co-signed by Whit Miranda [facility] Supervisor, on: January 2, 2023

## 2023-01-16 ENCOUNTER — MYC MEDICAL ADVICE (OUTPATIENT)
Dept: OPHTHALMOLOGY | Facility: CLINIC | Age: 68
End: 2023-01-16
Payer: MEDICARE

## 2023-01-16 DIAGNOSIS — H40.003 GLAUCOMA SUSPECT OF BOTH EYES: ICD-10-CM

## 2023-01-23 ENCOUNTER — VIRTUAL VISIT (OUTPATIENT)
Dept: PSYCHIATRY | Facility: CLINIC | Age: 68
End: 2023-01-23
Payer: MEDICARE

## 2023-01-23 ENCOUNTER — HEALTH MAINTENANCE LETTER (OUTPATIENT)
Age: 68
End: 2023-01-23

## 2023-01-23 DIAGNOSIS — F99 INSOMNIA DUE TO OTHER MENTAL DISORDER: ICD-10-CM

## 2023-01-23 DIAGNOSIS — F41.1 GAD (GENERALIZED ANXIETY DISORDER): ICD-10-CM

## 2023-01-23 DIAGNOSIS — F31.31 BIPOLAR AFFECTIVE DISORDER, CURRENTLY DEPRESSED, MILD (H): Primary | ICD-10-CM

## 2023-01-23 DIAGNOSIS — F51.05 INSOMNIA DUE TO OTHER MENTAL DISORDER: ICD-10-CM

## 2023-01-23 PROCEDURE — 99213 OFFICE O/P EST LOW 20 MIN: CPT | Mod: 95 | Performed by: NURSE PRACTITIONER

## 2023-01-23 RX ORDER — ARIPIPRAZOLE 5 MG/1
5 TABLET ORAL DAILY
Qty: 90 TABLET | Refills: 0 | Status: SHIPPED | OUTPATIENT
Start: 2023-01-23 | End: 2023-04-24

## 2023-01-23 RX ORDER — LAMOTRIGINE 150 MG/1
TABLET ORAL
Qty: 180 TABLET | Refills: 0 | Status: SHIPPED | OUTPATIENT
Start: 2023-01-23 | End: 2023-04-24

## 2023-01-23 RX ORDER — TRAZODONE HYDROCHLORIDE 100 MG/1
100 TABLET ORAL
Qty: 90 TABLET | Refills: 3 | Status: SHIPPED | OUTPATIENT
Start: 2023-01-23 | End: 2023-07-31

## 2023-01-23 ASSESSMENT — ANXIETY QUESTIONNAIRES
2. NOT BEING ABLE TO STOP OR CONTROL WORRYING: SEVERAL DAYS
IF YOU CHECKED OFF ANY PROBLEMS ON THIS QUESTIONNAIRE, HOW DIFFICULT HAVE THESE PROBLEMS MADE IT FOR YOU TO DO YOUR WORK, TAKE CARE OF THINGS AT HOME, OR GET ALONG WITH OTHER PEOPLE: NOT DIFFICULT AT ALL
7. FEELING AFRAID AS IF SOMETHING AWFUL MIGHT HAPPEN: SEVERAL DAYS
1. FEELING NERVOUS, ANXIOUS, OR ON EDGE: NEARLY EVERY DAY
5. BEING SO RESTLESS THAT IT IS HARD TO SIT STILL: NOT AT ALL
4. TROUBLE RELAXING: SEVERAL DAYS
6. BECOMING EASILY ANNOYED OR IRRITABLE: SEVERAL DAYS
3. WORRYING TOO MUCH ABOUT DIFFERENT THINGS: NOT AT ALL
GAD7 TOTAL SCORE: 7
GAD7 TOTAL SCORE: 7
7. FEELING AFRAID AS IF SOMETHING AWFUL MIGHT HAPPEN: SEVERAL DAYS
8. IF YOU CHECKED OFF ANY PROBLEMS, HOW DIFFICULT HAVE THESE MADE IT FOR YOU TO DO YOUR WORK, TAKE CARE OF THINGS AT HOME, OR GET ALONG WITH OTHER PEOPLE?: NOT DIFFICULT AT ALL
GAD7 TOTAL SCORE: 7

## 2023-01-23 NOTE — PROGRESS NOTES
"Eulalia is a 67 year old who is being evaluated via a billable video visit.      How would you like to obtain your AVS? MyChart  If the video visit is dropped, the invitation should be resent by: Text to cell phone: [phone]  Will anyone else be joining your video visit? No        Video-Visit Details    Type of service:  Video Visit   Video Start Time: 2:19 PM  Video End Time:2:45 PM    Originating Location (pt. Location): Home    Distant Location (provider location):  Off-site  Platform used for Video Visit: [facility]              Outpatient Psychiatric Progress Note    Name: Emperatriz Esteban   : 1955                    Primary Care Provider: Nayana Wheat MD   Therapist: Yes    PHQ-9 scores:  PHQ-9 SCORE 2022 2022 1/10/2023   PHQ-9 Total Score MyChart 3 (Minimal depression) 3 (Minimal depression) -   PHQ-9 Total Score 3 3 3       VAL-7 scores:  VAL-7 SCORE 1/10/2023 2023   Total Score - 7 (mild anxiety)   Total Score 9 7       Patient Identification:    Patient is a 67 year old year old,   White Not  or  female  who presents for return visit with me.  Patient is currently employed part time. Patient attended the session alone. Patient prefers to be called: \"Eulalia\".    Current medications include: ARIPiprazole (ABILIFY) 5 MG tablet, Take 1 tablet (5 mg) by mouth daily  aspirin 81 MG EC tablet, Take 81 mg by mouth daily   atorvastatin (LIPITOR) 20 MG tablet, TAKE 1 TABLET BY MOUTH EVERY DAY  brimonidine (ALPHAGAN) 0.2 % ophthalmic solution, Place 1 drop Into the left eye 2 times daily  dorzolamide-timolol (COSOPT) 2-0.5 % ophthalmic solution, Place 1 drop into both eyes 2 times daily  lamoTRIgine (LAMICTAL) 150 MG tablet, TAKE TWO TABLETS BY MOUTH DAILY.  levothyroxine (SYNTHROID, LEVOTHROID) 25 MCG tablet, Take 1 tablet (25 mcg) by mouth daily  montelukast (SINGULAIR) 10 MG tablet, Take 1 tablet (10 mg) by mouth At Bedtime  Multiple Minerals-Vitamins (ADVANCED " CALCIUM/D/MAGNESIUM) TABS, Take 1,500 mg by mouth daily   multivitamin (OCUVITE) TABS, Take 1 tablet by mouth daily   omega 3 1000 MG CAPS, Take 3 g by mouth daily   traZODone (DESYREL) 100 MG tablet, Take 1 tablet (100 mg) by mouth nightly as needed for sleep    No current facility-administered medications on file prior to visit.       The [place] Prescription Monitoring Program has been reviewed and there are no concerns about diversionary activity for controlled substances at this time.      I was able to review most recent Primary Care Provider, specialty provider, and therapy visit notes that I have access to.     Today, patient reports that she walks 2-1/2 miles every day.  Eulalia continues to care for her elderly relatives.  She is social with her peers.  Sleep can be disrupted at times.  At no point did she endorse any suicide thoughts.  She desires no changes in her medications today.      has a past medical history of Bipolar 1 disorder (H).    Social history updates:    Eulalia lives alone.  She denies any financial stressors.    Substance use updates:    No alcohol use reported  Tobacco use: No    Vital Signs:   There were no vitals taken for this visit.    Labs:    Most recent laboratory results reviewed and no new labs.     Mental Status Examination:  Appearance: awake, alert and casual dress  Attitude: cooperative  Eye Contact:  adequate  Gait and Station: No assistive Devices used and No dizziness or falls  Psychomotor Behavior:  intact station, gait and muscle tone  Oriented to:  time, person, and place  Attention Span and Concentration:  Normal  Speech:   clear, coherent, paucity of speech and Speaks: English  Mood:  anxious and depressed  Affect:  mood congruent  Associations:  no loose associations  Thought Process:  goal oriented  Thought Content:  no evidence of suicidal ideation or homicidal ideation, no auditory hallucinations present and no visual hallucinations present  Recent and Remote  Memory:  intact Not formally assessed. No amnesia.  Fund of Knowledge: appropriate  Insight:  good  Judgment:  intact  Impulse Control:  intact    Suicide Risk Assessment:  Today Emperatriz Esteban reports no thoughts to harm themself or others. In addition, there are notable risk factors for self-harm, including anxiety and mood change. However, risk is mitigated by history of seeking help when needed, future oriented, denies suicidal intent or plan and denies homicidal ideation, intent, or plan. Therefore, based on all available evidence including the factors cited above, Emperatriz Esteban does not appear to be at imminent risk for self-harm, does not meet criteria for a 72-hr hold, and therefore remains appropriate for ongoing outpatient level of care.  A thorough assessment of risk factors related to suicide and self-harm have been reviewed and are noted above. The patient convincingly denies suicidality on several occasions. Local community safety resources printed and reviewed for patient to use if needed. There was no deceit detected, and the patient presented in a manner that was believable.     DSM5 Diagnosis:  296.89 Bipolar II Disorder With mixed features and mild  300.02 (F41.1) Generalized Anxiety Disorder  780.52 (G47.00) Insomnia Disorder   With non-sleep disorder mental comorbidity  Episodic      Medical comorbidities include:   Patient Active Problem List    Diagnosis Date Noted     Impaired fasting glucose 12/16/2021     Priority: Medium     Dry mouth 12/16/2021     Priority: Medium     Prediabetes 09/02/2016     Priority: Medium     Gastroesophageal reflux disease without esophagitis 09/01/2016     Priority: Medium     Chronic rhinitis 09/01/2016     Priority: Medium     Takes singulair       Glaucoma suspect, unspecified laterality 09/01/2016     Priority: Medium     Borderline personality disorder (H) 09/01/2016     Priority: Medium     Follows with psychiatry in [place], [place] (in [place] for a few months)        Bipolar affective disorder (H) 09/01/2016     Priority: Medium     Follows with psychiatry in [place], [place] (in [place] for a few months)         Other specified hypothyroidism 09/01/2016     Priority: Medium     Other insomnia 09/01/2016     Priority: Medium       Assessment:    Emperatriz Esteban reported in today that her mood is relatively stable.  While she experiences some mild fluctuation, it is mostly manageable.  Occasionally there are periods of mild irritability.  Sleep can get corrupted but she mostly feels rested.  Regular exercise and caring for family members as well as socializing with peers occupies most of her days.  At no point did she endorse any suicide thinking..  She reports no side effects from her medications.  No skin rashes, no signs or symptoms of tardive dyskinesia reported or observed.    Medication side effects and alternatives were reviewed. Health promotion activities recommended and reviewed today. All questions addressed. Education and counseling completed regarding risks and benefits of medications and psychotherapy options.    Treatment Plan:        1.  Abilify 5 mg daily    2.  Lamotrigine 300 mg daily    3.  Trazodone 100 mg at bedtime    4.  Talk therapy, when you feeling need to, for developing skills to manage life stressors and adjustments        Continue all other medications as reviewed per electronic medical record today.     Safety plan reviewed. To the Emergency Department as needed or call after hours crisis line at [phone] or [phone]. [place] Crisis Text [place]. Text [place] to 186844 or Suicide LifeLine Chat: suicidepreventionlifeline.org/chat/    To schedule individual or family therapy, call [facility] at [phone]    Schedule an appointment with me in 3 months or sooner as needed. Call [facility] at [phone] to schedule.    Follow up with primary care provider as planned or for acute medical concerns.    Call the psychiatric  nurse line with medication questions or concerns at [phone]    MyChart may be used to communicate with your provider, but this is not intended to be used for emergencies.    Crisis Resources:    National Suicide Prevention Lifeline: 284.711.4402 (TTY: 731.821.5841). Call anytime for help.  (www.suicidepreventionlifeline.org)  National [place] on Mental Illness (www.rey.org): 906.589.4491 or 209-021-1863.   Mental Health Association (www.mentalhealth.org): 760.300.8017 or 508-767-2130.  [place] Crisis [place]: Text [place] to 221732  Suicide LifeLine Chat: suicidepreHygia Health Servicesline.org/chat    Administrative Billing:   Time spent with patient includes counseling and coordination of care regarding above diagnoses and treatment plan.    Patient Status:  Patient will continue to be seen for ongoing consultation and stabilization.    Signed:   GLORIA Mims-BC   Psychiatry         Answers for HPI/ROS submitted by the patient on 1/23/2023  VAL 7 TOTAL SCORE: 7

## 2023-01-23 NOTE — NURSING NOTE
Pt declined qn[facility] over the phone [facility]      Ashlyn Mixon,  VVF

## 2023-01-24 ENCOUNTER — TELEPHONE (OUTPATIENT)
Dept: BEHAVIORAL HEALTH | Facility: CLINIC | Age: 68
End: 2023-01-24
Payer: MEDICARE

## 2023-01-24 RX ORDER — DORZOLAMIDE HYDROCHLORIDE AND TIMOLOL MALEATE 20; 5 MG/ML; MG/ML
1-2 SOLUTION/ DROPS OPHTHALMIC 2 TIMES DAILY
Qty: 10 ML | Refills: 11 | Status: SHIPPED | OUTPATIENT
Start: 2023-01-24 | End: 2023-01-31 | Stop reason: DRUGHIGH

## 2023-01-24 RX ORDER — DORZOLAMIDE HYDROCHLORIDE AND TIMOLOL MALEATE 20; 5 MG/ML; MG/ML
1 SOLUTION/ DROPS OPHTHALMIC 2 TIMES DAILY
Qty: 10 ML | Refills: 3 | Status: SHIPPED | OUTPATIENT
Start: 2023-01-24 | End: 2023-01-24

## 2023-01-24 NOTE — TELEPHONE ENCOUNTER
Writer spoke w/ pt who requested a call back on 1/25 at 12pm as she was busy. Writer will set f/u date for 1/25.     Briana GREWAL   1.25.2023 1721      ----- Message from Sam Enriquez sent at 1/24/2023  1:51 PM CST -----  Transition Clinic Referral   [place]         Please Check Type of Referral Requested:       __X__THERAPY: The Transition clinic is able to schedule patients without current medical insurance; these patient will be referred to our Social Work Care Coordinator for Medical Insurance              Assistance. We are open for referral for psychotherapy. Patient is referred from:  Other     [facility] PSYCHIATRY    _NA___MEDICATION:  Referrals for Medication are ONLY accepted from the following areas (select): NA                                       Suboxone and Opioid Management Referrals are automatically denied.  Psychiatry cannot see patient without active medical insurance.       GUARDIAN: If your patient is not their own Guardian, please provide the following:    Guardian Name:  Guardian Contact Information (Phone & Email) :  Guardian Address:     FOSTER CARE PROVIDER: If your patient lives at a Licensed Foster Care, please provide the following:    Foster Provider:  Foster Provider Contact Information (Phone & Email):  Foster Provider Address:         Referring Provider Contact Name: Malia Vang NP; Phone Number: unk    Reason for Transition Clinic Referral: F31.31 (ICD-10-CM) - Bipolar affective disorder, currently depressed, mild (H)   F51.05, F99 (ICD-10-CM) - Insomnia due to other mental disorder    Next Level of Care Patient Will Be Transitioned To:   Provider(s)  Location   Date/Time Eulalia Esteban MRN: [id number]   Date: 6/1/2023 Status: Scheduled  Time: 11:00 AM Length: 60  Visit Type: ADULT PSYCHOTHERAPY NEW [[id number]] Copay: $0.00  Provider: Maranda Foster LICSW Department: [facility]  Encounter #: [id number]          What Would Be Helpful from  "the Transition Clinic: bridge gap     Needs: NO    Does Patient Have Access to Technology: apolonia    Patient E-mail Address: [email or web address]    Current Patient Phone Number: [phone];     Clinician Gender Preference (if applicable): Pt asking for a \"seasoned\" provider     Patient location preference: [place]                   "

## 2023-01-25 ENCOUNTER — TELEPHONE (OUTPATIENT)
Dept: BEHAVIORAL HEALTH | Facility: CLINIC | Age: 68
End: 2023-01-25
Payer: MEDICARE

## 2023-01-25 NOTE — TELEPHONE ENCOUNTER
Second attempt to reach patient. Spoke to patient regarding Transition Clinic referral. Scheduled patient for Transition Clinic services: 01- 9:30am    Sending intake paperwork via [employer].    Jacquelin Jimenez,   [facility] [place] [facility]  [phone]    Triage and Transition Services - Behavioral Healthcare Providers

## 2023-01-25 NOTE — TELEPHONE ENCOUNTER
Writer contacted pt to reschedule therapy appointment. Pt reported being busy and agreed for TC therapy appointment to be canceled. Pt reported that she will call back on 1/26.     Briana GREWAL  1.25.2023  1498

## 2023-01-25 NOTE — TELEPHONE ENCOUNTER
----- Message from Sam Enriquez sent at 1/24/2023  1:51 PM CST -----  Transition Clinic Referral   [place]         Please Check Type of Referral Requested:       __X__THERAPY: The Transition clinic is able to schedule patients without current medical insurance; these patient will be referred to our Social Work Care Coordinator for Medical Insurance              Assistance. We are open for referral for psychotherapy. Patient is referred from:  Other     [facility] PSYCHIATRY    _NA___MEDICATION:  Referrals for Medication are ONLY accepted from the following areas (select): NA                                       Suboxone and Opioid Management Referrals are automatically denied. TC Psychiatry cannot see patient without active medical insurance.       GUARDIAN: If your patient is not their own Guardian, please provide the following:    Guardian Name:  Guardian Contact Information (Phone & Email) :  Guardian Address:     FOSTER CARE PROVIDER: If your patient lives at a Licensed Foster Care, please provide the following:    Foster Provider:  Foster Provider Contact Information (Phone & Email):  Foster Provider Address:         Referring Provider Contact Name: Malia Vang NP; Phone Number: unk    Reason for Transition Clinic Referral: F31.31 (ICD-10-CM) - Bipolar affective disorder, currently depressed, mild (H)   F51.05, F99 (ICD-10-CM) - Insomnia due to other mental disorder    Next Level of Care Patient Will Be Transitioned To:   Provider(s)  Location   Date/Time Eulalia, Eulalia JACOBO MRN: [id number]   Date: 6/1/2023 Status: Scheduled  Time: 11:00 AM Length: 60  Visit Type: ADULT PSYCHOTHERAPY NEW [[id number]] Copay: $0.00  Provider: Maranda Foster LICSW Department: [facility]  Encounter #: [id number]          What Would Be Helpful from the Transition Clinic: bridge gap     Needs: NO    Does Patient Have Access to Technology: unk    Patient E-mail Address:  "[email or web address]    Current Patient Phone Number: [phone];     Clinician Gender Preference (if applicable): Pt asking for a \"seasoned\" provider     Patient location preference: [place]                 "

## 2023-01-26 ENCOUNTER — MYC MEDICAL ADVICE (OUTPATIENT)
Dept: FAMILY MEDICINE | Facility: CLINIC | Age: 68
End: 2023-01-26
Payer: MEDICARE

## 2023-01-30 ENCOUNTER — TELEPHONE (OUTPATIENT)
Dept: OPHTHALMOLOGY | Facility: CLINIC | Age: 68
End: 2023-01-30
Payer: MEDICARE

## 2023-01-30 DIAGNOSIS — H40.003 GLAUCOMA SUSPECT OF BOTH EYES: Primary | ICD-10-CM

## 2023-01-30 DIAGNOSIS — H40.1190 PRIMARY OPEN ANGLE GLAUCOMA: ICD-10-CM

## 2023-01-30 NOTE — TELEPHONE ENCOUNTER
"Per review of 2/10/22 mammogram result:  \"IMPRESSION: BI-RADS CATEGORY: 1 -  NEGATIVE.     RECOMMENDED FOLLOW-UP: Annual Mammography.\"    1/26/23 letter reviewed.    Writer responded via [employer].      ARLENE GambinoN, RN-[facility]        "

## 2023-01-30 NOTE — TELEPHONE ENCOUNTER
[facility] Call Center    Phone Message    May a detailed message be left on voicemail: yes     Reason for Call: Medication Question or concern regarding medication   Prescription Clarification  Name of Medication: dorzolamide-timolol (COSOPT) 2-0.5 % ophthalmic solution  Prescribing Provider: Dr. Cydney Montes   Pharmacy: [facility] - [address] [facility] AT Portal to Registered [facility] Sites   What on the order needs clarification? Yvette from [facility] calling to clarify the orders for the medication. She says current orders go against recommendation for the medication. Please call Yvette back to advise. Reference Number: [id number]. Thank you    Action Taken: Message routed to:  [facility] Ophthalmology    Travel Screening: Not Applicable

## 2023-01-31 RX ORDER — DORZOLAMIDE HYDROCHLORIDE AND TIMOLOL MALEATE 20; 5 MG/ML; MG/ML
1 SOLUTION/ DROPS OPHTHALMIC 2 TIMES DAILY
Qty: 10 ML | Refills: 11 | Status: SHIPPED | OUTPATIENT
Start: 2023-01-31 | End: 2023-02-16

## 2023-01-31 NOTE — TELEPHONE ENCOUNTER
Updated Rx to mail order pharmacy-- previously sent as mg/ml instead of percentage (same eye drop/concentraion)    Ciro Mendoza RN 7:20 AM 01/31/23

## 2023-02-03 NOTE — PATIENT INSTRUCTIONS
1.  Abilify 5 mg daily  2.  Lamotrigine 300 mg daily  3.  Trazodone 100 mg at bedtime  4.  Talk therapy, when you feeling need to, for developing skills to manage life stressors and adjustments    Continue all other medications as reviewed per electronic medical record today.   Safety plan reviewed. To the Emergency Department as needed or call after hours crisis line at [phone] or [phone]. [place] Crisis Text [place]. Text [place] to 202506 or Suicide LifeLine Chat: suicideTabtor.org/chat/  To schedule individual or family therapy, call [facility] at [phone]  Schedule an appointment with me in 3 months or sooner as needed. Call [facility] at [phone] to schedule.  Follow up with primary care provider as planned or for acute medical concerns.  Call the psychiatric nurse line with medication questions or concerns at [phone]  MyChart may be used to communicate with your provider, but this is not intended to be used for emergencies.    Crisis Resources:    National Suicide Prevention Lifeline: 400.815.5094 (TTY: 512.609.1609). Call anytime for help.  (www.suicidepreventionlifeline.org)  National [place] on Mental Illness (www.rey.org): 403.399.8683 or 517-793-9700.   Mental Health Association (www.mentalhealth.org): 472.816.9774 or 217-433-9676.  [place] Crisis Text [place]: Text [place] to 509959  Suicide LifeLine Chat: suicideIkonisysline.org/chat

## 2023-02-23 DIAGNOSIS — H40.003 GLAUCOMA SUSPECT OF BOTH EYES: ICD-10-CM

## 2023-02-23 DIAGNOSIS — H35.371 EPIRETINAL MEMBRANE (ERM) OF RIGHT EYE: Primary | ICD-10-CM

## 2023-03-22 DIAGNOSIS — H40.003 GLAUCOMA SUSPECT OF BOTH EYES: ICD-10-CM

## 2023-03-22 RX ORDER — BRIMONIDINE TARTRATE 2 MG/ML
1 SOLUTION/ DROPS OPHTHALMIC 2 TIMES DAILY
Qty: 10 ML | Refills: 3 | Status: SHIPPED | OUTPATIENT
Start: 2023-03-22 | End: 2023-07-19

## 2023-03-22 NOTE — TELEPHONE ENCOUNTER
brimonidine (ALPHAGAN) 0.2 % ophthalmic solution  Last Written Prescription Date:   9/12/2022  Last Fill Quantity: 10,   # refills: 3  Last Office Visit :  10/24/2022  Future Office visit:   4/24/2023     Jose Ryder MD  Ophthalmology     [facility] Taking:  Cosopt BID each eye   Brimonidine BID left eye   PFAT as needed    warm compresses, lid hygiene, humidification and fish oil recommended     Allergies:  Prednisone - raises eye pressure    Assessment:  #Dry eye syndrome   Patient complaining of dry eyes and pain after application of dilating eyedrops  - had plugs inserted in the past, may consider them in case of worsening.   - Hx punctate epithelial erosions.  - Patient states serum tears has worked well for her.   - serum tears QID OU - patient is happy with the serum tears for now  - omega-3 2000mg po daily  - Discussed eyelid hygiene measures  -Keep eye drops (AT), instructions for dry eye given     # history of myopic degeneration  - Shallow posterior staphylomas both eyes  - Retina detachment precautions were discussed with the patient (presence or increased in flashes, floaters or a curtain in the visual field) and was asked to return if any of the those occur      # Primary open angle glaucoma (POAG) both eyes    Advance left eye.   ONFL correlated thinning with arcuate visual field defect    Gonio: open 360 to CB both eyes   K pachy:  539/570  Asthma/COPD: No  Steroid Use: No    Kidney Stones: No    Sulfa Allergy:      No   Tmax: unknown  Today 16/17      # partial thickness Macula hole right eye   Stable - observe     # pseudophakic both eyes   Stable     # history of vitrectomy left eye   By Dr. Henok Bolton in [facility]     Follow up Cornea: 6 months  6 months alternating with Retina w/ V,T at next visit, call if problems sooner to clinic.     Retina follow up in 3-4 months with ONFL, macula Optical Coherence Tomography and OVF24-2. No dilation   - will alternate glaucoma testing if  stable     ALSO SEES:  Dr ANTHONY Montes.      10 mL, 3 Refills sent to pharm       Mavis Murray RN  Central Triage Red Flags/Med Refills

## 2023-04-24 ENCOUNTER — VIRTUAL VISIT (OUTPATIENT)
Dept: PSYCHIATRY | Facility: CLINIC | Age: 68
End: 2023-04-24
Payer: MEDICARE

## 2023-04-24 ENCOUNTER — OFFICE VISIT (OUTPATIENT)
Dept: OPHTHALMOLOGY | Facility: CLINIC | Age: 68
End: 2023-04-24
Attending: OPHTHALMOLOGY
Payer: MEDICARE

## 2023-04-24 DIAGNOSIS — H52.13 HIGH MYOPIA, BILATERAL: ICD-10-CM

## 2023-04-24 DIAGNOSIS — F31.31 BIPOLAR AFFECTIVE DISORDER, CURRENTLY DEPRESSED, MILD (H): Primary | ICD-10-CM

## 2023-04-24 DIAGNOSIS — F41.1 GAD (GENERALIZED ANXIETY DISORDER): ICD-10-CM

## 2023-04-24 DIAGNOSIS — F51.05 INSOMNIA DUE TO OTHER MENTAL DISORDER: ICD-10-CM

## 2023-04-24 DIAGNOSIS — Z96.1 PSEUDOPHAKIA: ICD-10-CM

## 2023-04-24 DIAGNOSIS — H35.341 MACULAR HOLE OF RIGHT EYE: ICD-10-CM

## 2023-04-24 DIAGNOSIS — F99 INSOMNIA DUE TO OTHER MENTAL DISORDER: ICD-10-CM

## 2023-04-24 DIAGNOSIS — H16.213 EXPOSURE KERATOCONJUNCTIVITIS OF BOTH EYES: ICD-10-CM

## 2023-04-24 DIAGNOSIS — H04.123 DRY EYES, BILATERAL: Primary | ICD-10-CM

## 2023-04-24 PROCEDURE — G0463 HOSPITAL OUTPT CLINIC VISIT: HCPCS | Performed by: OPHTHALMOLOGY

## 2023-04-24 PROCEDURE — 99213 OFFICE O/P EST LOW 20 MIN: CPT | Mod: VID | Performed by: NURSE PRACTITIONER

## 2023-04-24 PROCEDURE — 99213 OFFICE O/P EST LOW 20 MIN: CPT | Mod: GC | Performed by: OPHTHALMOLOGY

## 2023-04-24 RX ORDER — ARIPIPRAZOLE 5 MG/1
5 TABLET ORAL DAILY
Qty: 90 TABLET | Refills: 1 | Status: SHIPPED | OUTPATIENT
Start: 2023-04-24 | End: 2023-07-31

## 2023-04-24 RX ORDER — LAMOTRIGINE 150 MG/1
TABLET ORAL
Qty: 180 TABLET | Refills: 1 | Status: SHIPPED | OUTPATIENT
Start: 2023-04-24 | End: 2023-07-31

## 2023-04-24 ASSESSMENT — REFRACTION_WEARINGRX
OS_CYLINDER: +1.50
OS_ADD: +2.75
OS_AXIS: 159
SPECS_TYPE: PAL
OD_AXIS: 174
OD_CYLINDER: +1.00
OD_SPHERE: -4.75
OS_SPHERE: -6.25
OD_ADD: +2.75

## 2023-04-24 ASSESSMENT — EXTERNAL EXAM - LEFT EYE: OS_EXAM: NORMAL

## 2023-04-24 ASSESSMENT — CONF VISUAL FIELD
OD_SUPERIOR_NASAL_RESTRICTION: 0
OS_INFERIOR_NASAL_RESTRICTION: 0
OD_SUPERIOR_TEMPORAL_RESTRICTION: 0
OD_NORMAL: 1
OS_SUPERIOR_TEMPORAL_RESTRICTION: 0
OS_SUPERIOR_NASAL_RESTRICTION: 0
OD_INFERIOR_TEMPORAL_RESTRICTION: 0
OD_INFERIOR_NASAL_RESTRICTION: 0
OS_INFERIOR_TEMPORAL_RESTRICTION: 0
METHOD: COUNTING FINGERS
OS_NORMAL: 1

## 2023-04-24 ASSESSMENT — VISUAL ACUITY
OS_CC+: -1
METHOD: SNELLEN - LINEAR
OD_CC+: -1
OD_CC: 20/20
OS_CC: 20/40

## 2023-04-24 ASSESSMENT — TONOMETRY
IOP_METHOD: ICARE
OD_IOP_MMHG: 17
OS_IOP_MMHG: 15

## 2023-04-24 ASSESSMENT — PATIENT HEALTH QUESTIONNAIRE - PHQ9
SUM OF ALL RESPONSES TO PHQ QUESTIONS 1-9: 1
SUM OF ALL RESPONSES TO PHQ QUESTIONS 1-9: 1

## 2023-04-24 ASSESSMENT — SLIT LAMP EXAM - LIDS
COMMENTS: NORMAL
COMMENTS: NORMAL

## 2023-04-24 ASSESSMENT — EXTERNAL EXAM - RIGHT EYE: OD_EXAM: NORMAL

## 2023-04-24 NOTE — PATIENT INSTRUCTIONS
"Patient Education   The Panel Psychiatry Program  What to Expect  Here's what to expect in the Panel Psychiatry Program.   About the program  You'll be meeting with a psychiatric doctor to check your mental health. A psychiatric doctor helps you deal with troubling thoughts and feelings by giving you medicine. They'll make sure you know the plan for your care. You may see them for a long time. When you're feeling better, they may refer you back to seeing your family doctor.   If you have any questions, we'll be glad to talk to you.  About visits  Be open  At your visits, please talk openly about your problems. It may feel hard, but it's the best way for us to help you.  Cancelling visits  If you can't come to your visit, please call us right away at [phone]. If you don't cancel at least 24 hours (1 full day) before your visit, that's \"late cancellation.\"  Not showing up for your visits  Being very late is the same as not showing up. You'll be a \"no show\" if:  You're more than 15 minutes late for a 30-minute (half hour) visit.  You're more than 30 minutes late for a 60-minute (full hour) visit.  If you cancel late or don't show up 2 times within 6 months, we may end your care.  Getting help between visits  If you need help between visits, you can call us Monday to Friday from 8 a.m. to 4:30 p.m. at [phone].  Emergency care  Call 911 or go to the nearest emergency department if your life or someone else's life is in danger.  Call 988 anytime to reach the national Suicide and Crisis hotline.  Medicine refills  To refill your medicine, call your pharmacy. You can also call [facility]'s Behavioral Access at [phone], Monday to Friday, 8 a.m. to 4:30 p.m. It can take 1 to 3 business days to get a refill.   Forms, letters, and tests  You may have papers to fill out, like FMLA, short-term disability, and workability. We can help you with these forms at your visits, but you must have an " appointment. You may need more than 1 visit for this, to be in an intensive therapy program, or both.  Before we can give you medicine for ADHD, we may refer you to get tested for it or confirm it another way.  We may not be able to give you an emotional support animal letter.  We don't do mental health checks ordered by the court.   We don't do mental health testing, but we can refer you to get tested.   Thank you for choosing us for your care.  For informational purposes only. Not to replace the advice of your health care provider. Copyright   2022 [facility] [employer]. All rights reserved. [employer] 164960 - 12/22.    Treatment Plan:  1.  Continue Abilify 5 mg daily  2.  Continue lamotrigine 300 mg daily  3.  Continue trazodone 100 mg at bedtime as needed for insomnia-no refills needed today    Continue all other medications as reviewed per electronic medical record today.   Safety plan reviewed. To the Emergency Department as needed or call after hours crisis line at [phone] or [phone]. [place] Crisis Text Line. Text [place] to 779530 or Suicide LifeLine Chat: suicidepreventionlifeline.org/chat/  To schedule individual or family therapy, call [facility] at [phone]  Schedule an appointment with me in 3 months or sooner as needed. Call [facility] at [phone] to schedule.  Follow up with primary care provider as planned or for acute medical concerns.  Call the psychiatric nurse line with medication questions or concerns at [phone]  MyChart may be used to communicate with your provider, but this is not intended to be used for emergencies.    Crisis Resources:    National Suicide Prevention Lifeline: 463.568.6331 (TTY: 828.457.8973). Call anytime for help.  (www.suicidepreventionlifeline.org)  National [place] on Mental Illness (www.rey.org): 861.953.2547 or 335-422-9206.   Mental Health Association (www.mentalhealth.org): 823.846.2836 or  505-516-5617.  [place] Crisis Text Line: Text [place] to 866380  Suicide LifeLine Chat: suicidepreventionlifeline.org/chat

## 2023-04-24 NOTE — PROGRESS NOTES
CC: Dry Eye each eye     Referring Provider: Dr ANTHONY Montes    HPI:  Emperatriz Esteban is a  68 year old year-old patient with history of glaucoma and cataract surgery.  Moved from [place] May 2021 and she is here to establish eye care. Reports chronic progressive decreased vision both eyes.  She is referred to us from Dr. Montes for evaluation of her dry eyes.  She has not been doing eyelid hygiene.  She has used serum tears in the past and found them helpful.  She has not used them since 2021.  She uses Refresh Tremayne intermittently.  She has had punctal plugs in the past that were dissolvable which were helpful.     Interval hx   Feels well without pain or irritation in the eyes. Using serum tears though not very frequently as she often forgets. No flashes, floaters, curtains.     POHx:  History of myopia age 6 with CL   1997 cataract surgery both eyes   In the past 10 ys diagnosed with glaucoma: cosopt twice a day both eyes and brimonidine twice a day left eye   2016 diagnosis of myopic degeneration - followed by Henok Malonereteliot Santos (glaucoma). 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  Patient complaining of dry eyes and pain after application of dilating eyedrops+  Dryness is stable for now but patient is not always comfortable  - had plugs inserted in the past, may consider them in case of worsening.   - Patient states serum tears have worked well for her.     Plan  -increase serum tears to 6 times a day   - omega-3 2000mg po daily  -  Discussed eyelid hygiene measures  -Keep eye drops (AT), instructions for dry eye given  -start AT ointment at night for the exposure  Informed the patient that if symptoms persist or worsen she can consider eyelid sx    # history of myopic degeneration  - Shallow posterior staphylomas both eyes  - Retina detachment precautions were discussed with the patient (presence or increased in flashes, floaters or a curtain in the visual field) and was asked to return if any of the those occur     # Primary open angle glaucoma (POAG) both eyes    Advance left eye.   Last gonio: open 360 to CB both eyes   Last pachy:  539/570  Asthma/COPD: No  Steroid Use: No Kidney Stones: No Sulfa Allergy:      No   Tmax: unknown  Today IOP stable  Following with Dr. Montes     # partial thickness Macula hole right eye   Stable - observe     # pseudophakic both eyes   Stable     # history of vitrectomy left eye   By Dr. Henok Bolton in [facility]    Follow up Cornea: 1 yearw/ V,T at next visit, call if problems sooner to clinic.    Retina follow up in 3-4 months with ONFL, macula Optical Coherence Tomography and OVF24-2. No dilation   - will alternate glaucoma testing if stable    ALSO SEES:  Dr ANTHONY Montes.    Jordon Buck MD  Fellow, Cornea, External Disease and Refractive Surgery  Department of Ophthalmology  [facility]    Attending Physician Attestation:  Complete documentation of historical and exam elements from today's encounter can be found in the full encounter summary report (not reduplicated in this progress note).  I personally obtained the chief complaint(s) and history of present illness.  I confirmed and edited as necessary the review of systems, past medical/surgical history, family history, social history, and examination findings as documented by others; and I examined the patient myself.  I personally reviewed the relevant tests, images, and reports as documented above.  I formulated and edited as  necessary the assessment and plan and discussed the findings and management plan with the patient and family. - Jose Ryder MD

## 2023-04-24 NOTE — NURSING NOTE
Is the patient currently in the state of [place]? YES    Visit mode:VIDEO    If the visit is dropped, the patient can be reconnected by: VIDEO VISIT: Text to cell phone:   Telephone Information:   Mobile [phone]       Will anyone else be joining the visit? No  (If patient encounters technical issues they should call [phone])    How would you like to obtain your AVS? MyChart    Are changes needed to the allergy or medication list? NO    Rooming Documentation: Care team has reviewed attendance agreement with patient. Patient advised that two failed appointments within 6 months may lead to termination of current episode of care.      Reason for visit: Video Visit     KATHY Ford

## 2023-04-24 NOTE — PROGRESS NOTES
"Virtual Visit Details    Type of service:  Video Visit   Video Start Time: 10:46 AM  Video End Time:11:05 AM    Originating Location (pt. Location): Home    Distant Location (provider location):  On-site  Platform used for Video Visit: [facility]         Outpatient Psychiatric Progress Note    Name: Emperatriz Esteban   : 1955                    Primary Care Provider: Nayana Wheat MD   Therapist: yes     PHQ-9 scores:      2022    11:00 AM 1/10/2023     4:37 PM 2023     1:08 PM   PHQ-9 SCORE   PHQ-9 Total Score MyChart 3 (Minimal depression)  1 (Minimal depression)   PHQ-9 Total Score 3 3 1       VAL-7 scores:      1/10/2023     4:37 PM 2023     1:46 PM   VAL-7 SCORE   Total Score  7 (mild anxiety)   Total Score 9 7       Patient Identification:    Patient is a 68 year old year old,   White Not  or  female  who presents for return visit with me.  Patient is currently unemployed. Patient attended the session alone. Patient prefers to be called: \"Eulalia\".    Current medications include: ARIPiprazole (ABILIFY) 5 MG tablet, Take 1 tablet (5 mg) by mouth daily  aspirin 81 MG EC tablet, Take 81 mg by mouth daily   atorvastatin (LIPITOR) 20 MG tablet, Take 1 tablet (20 mg) by mouth daily  brimonidine (ALPHAGAN) 0.2 % ophthalmic solution, Place 1 drop Into the left eye 2 times daily  dorzolamide-timolol (COSOPT) 2-0.5 % ophthalmic solution, Place 1 drop into both eyes 2 times daily  lamoTRIgine (LAMICTAL) 150 MG tablet, TAKE TWO TABLETS BY MOUTH DAILY.  levothyroxine (SYNTHROID, LEVOTHROID) 25 MCG tablet, Take 1 tablet (25 mcg) by mouth daily  montelukast (SINGULAIR) 10 MG tablet, Take 1 tablet (10 mg) by mouth At Bedtime  Multiple Minerals-Vitamins (ADVANCED CALCIUM/D/MAGNESIUM) TABS, Take 1,500 mg by mouth daily   multivitamin (OCUVITE) TABS, Take 1 tablet by mouth daily   omega 3 1000 MG CAPS, Take 3 g by mouth daily   traZODone (DESYREL) 100 MG tablet, Take 1 tablet (100 mg) " by mouth nightly as needed for sleep    No current facility-administered medications on file prior to visit.       The [place] Prescription Monitoring Program has been reviewed and there are no concerns about diversionary activity for controlled substances at this time.      I was able to review most recent Primary Care Provider, specialty provider, and therapy visit notes that I have access to.     Today, patient reports that she has been dropping some of her activities in order to decrease her stress level.  She continues to assist her cousin who is 97 years of age.  With less community meetings to go to she finds herself feeling less stress.  He reports no sleep concerns.  Back pain can limit some of her abilities to move about both in her apartment and out in the community.     has a past medical history of Bipolar 1 disorder (H).    Social history updates:    Eulalia is active in the community.  She voiced no financial concerns.    Substance use updates:    No alcohol use reported  Tobacco use: No    Vital Signs:   There were no vitals taken for this visit.    Labs:    Most recent laboratory results reviewed and no new labs.     Mental Status Examination:  Appearance: awake, alert and casual dress  Attitude: cooperative  Eye Contact:  adequate  Gait and Station: No dizziness or falls  Psychomotor Behavior:  no evidence of tardive dyskinesia, dystonia, or tics and intact station, gait and muscle tone  Oriented to:  time, person, and place  Attention Span and Concentration:  Normal  Speech:   vtspeech: clear, coherent and Speaks: English  Mood:  : anxious and depressed  Affect:  : mood congruent  Associations:  no loose associations  Thought Process:  goal oriented  Thought Content:  no evidence of suicidal ideation or homicidal ideation, no auditory hallucinations present and no visual hallucinations present  Recent and Remote Memory:  intact Not formally assessed. No amnesia.  Fund of Knowledge:  appropriate  Insight:  good  Judgment: good  Impulse Control:  good    Suicide Risk Assessment:  Today Emperatriz Esteban reports no thoughts to harm themself or others. In addition, there are notable risk factors for self-harm, including single status, anxiety and mood change. However, risk is mitigated by commitment to family, history of seeking help when needed, future oriented, denies suicidal intent or plan and denies homicidal ideation, intent, or plan. Therefore, based on all available evidence including the factors cited above, Emperatriz Esteban does not appear to be at imminent risk for self-harm, does not meet criteria for a 72-hr hold, and therefore remains appropriate for ongoing outpatient level of care.  A thorough assessment of risk factors related to suicide and self-harm have been reviewed and are noted above. The patient convincingly denies suicidality on several occasions. Local community safety resources printed and reviewed for patient to use if needed. There was no deceit detected, and the patient presented in a manner that was believable.     DSM5 Diagnosis:  296.89 Bipolar II Disorder Depressed, With anxious distress and mild  300.02 (F41.1) Generalized Anxiety Disorder  780.52 (G47.00) Insomnia Disorder   With non-sleep disorder mental comorbidity  Episodic      Medical comorbidities include:   Patient Active Problem List    Diagnosis Date Noted     Impaired fasting glucose 12/16/2021     Priority: Medium     Dry mouth 12/16/2021     Priority: Medium     Prediabetes 09/02/2016     Priority: Medium     Gastroesophageal reflux disease without esophagitis 09/01/2016     Priority: Medium     Chronic rhinitis 09/01/2016     Priority: Medium     Takes singulair       Glaucoma suspect, unspecified laterality 09/01/2016     Priority: Medium     Borderline personality disorder (H) 09/01/2016     Priority: Medium     Follows with psychiatry in [place], [place] (in [place] for a few months)       Bipolar affective  disorder (H) 09/01/2016     Priority: Medium     Follows with psychiatry in [place], [place] (in [place] for a few months)         Other specified hypothyroidism 09/01/2016     Priority: Medium     Other insomnia 09/01/2016     Priority: Medium       Assessment:    Emperatriz Esteban reported in today that mood symptoms are mostly stable.  She denies any medication side effects.  At this point she will continue the lamotrigine and Abilify for mood regulation.  Trazodone is available at bedtime as needed.  He feels that her mood swings are mostly stable.  She reports no anger outbursts.  Caring for a family member can be stressful to her, but she has been limiting her activities on committees so that she can have more time to care for herself..    Medication side effects and alternatives were reviewed. Health promotion activities recommended and reviewed today. All questions addressed. Education and counseling completed regarding risks and benefits of medications and psychotherapy options.    Treatment Plan:        1.  Continue Abilify 5 mg daily    2.  Continue lamotrigine 300 mg daily    3.  Continue trazodone 100 mg at bedtime as needed for insomnia-no refills needed today        Continue all other medications as reviewed per electronic medical record today.     Safety plan reviewed. To the Emergency Department as needed or call after hours crisis line at [phone] or [phone]. [place] Crisis Text Line. Text [place] to 417800 or Suicide LifeLine Chat: suicidepreventionlifeline.org/chat/    To schedule individual or family therapy, call [facility] at [phone]    Schedule an appointment with me in 3 months or sooner as needed. Call [facility] at [phone] to schedule.    Follow up with primary care provider as planned or for acute medical concerns.    Call the psychiatric nurse line with medication questions or concerns at [phone]    MyChart may be used to communicate with your  provider, but this is not intended to be used for emergencies.    Crisis Resources:    National Suicide Prevention Lifeline: 330.874.6605 (TTY: 543.627.7645). Call anytime for help.  (www.suicidepreventionlifeline.org)  National [place] on Mental Illness (www.rey.org): 526.445.5070 or 927-446-5779.   Mental Health Association (www.mentalhealth.org): 904.179.4555 or 249-235-5612.  [place] Crisis Text Line: Text [place] to 144082  Suicide LifeLine Chat: suicidepreObalon Therapeuticsline.org/chat    Administrative Billing:   Time spent with patient includes counseling and coordination of care regarding above diagnoses and treatment plan.    Patient Status:  This is a continuous care patient and medications will be prescribed by the psychiatric provider until further indicated.    Signed:   GLORIA Mims-BC   Psychiatry

## 2023-04-24 NOTE — NURSING NOTE
Chief Complaints and History of Present Illnesses   Patient presents with     Follow Up     Dry eyes, bilateral     Chief Complaint(s) and History of Present Illness(es)     Follow Up            Comments: Dry eyes, bilateral          Comments    Pt states no change in VA since last visit  Pt states she is not using serum tears as much, only using 2X/day  States no watering, eye pain or redness    Carole Mirza COT 1:13 PM April 24, 2023

## 2023-05-11 DIAGNOSIS — R73.03 PREDIABETES: ICD-10-CM

## 2023-05-12 NOTE — TELEPHONE ENCOUNTER
"Routing refill request to provider for review/approval because:  Labs not current:  ldl  Due to be seen    Last Written Prescription Date:  2/27/23  Last Fill Quantity: 90,  # refills: 0   Last office visit provider:  12/16/21     Requested Prescriptions   Pending Prescriptions Disp Refills     atorvastatin (LIPITOR) 20 MG tablet [Pharmacy Med Name: ATORVASTATIN 20 MG TABLET] 90 tablet 0     Sig: TAKE 1 TABLET BY MOUTH EVERY DAY       Statins Protocol Failed - 5/11/2023 12:58 AM        Failed - LDL on file in past 12 months     Recent Labs   Lab Test 12/16/21  1417   LDL 93             Failed - Recent (12 mo) or future (30 days) visit within the authorizing provider's specialty     Patient has had an office visit with the authorizing provider or a provider within the authorizing providers department within the previous 12 mos or has a future within next 30 days. See \"Patient Info\" tab in inbasket, or \"Choose Columns\" in Meds & Orders section of the refill encounter.              Passed - No abnormal creatine kinase in past 12 months     No lab results found.             Passed - Medication is active on med list        Passed - Patient is age 18 or older        Passed - No active pregnancy on record        Passed - No positive pregnancy test in past 12 months             Mara Garza RN 05/12/23 3:16 PM  "

## 2023-05-16 RX ORDER — ATORVASTATIN CALCIUM 20 MG/1
TABLET, FILM COATED ORAL
Qty: 90 TABLET | Refills: 0 | Status: SHIPPED | OUTPATIENT
Start: 2023-05-16 | End: 2023-08-23

## 2023-05-25 ASSESSMENT — ENCOUNTER SYMPTOMS
COUGH: 0
EYE PAIN: 0
DYSURIA: 0
FREQUENCY: 0
HEADACHES: 1
NAUSEA: 0
PALPITATIONS: 0
HEARTBURN: 0
ABDOMINAL PAIN: 0
NERVOUS/ANXIOUS: 1
MYALGIAS: 1
SORE THROAT: 0
WEAKNESS: 0
HEMATOCHEZIA: 0
CONSTIPATION: 0
HEMATURIA: 0
DIARRHEA: 0
SHORTNESS OF BREATH: 0
ARTHRALGIAS: 1
DIZZINESS: 1
FEVER: 0
CHILLS: 0
BREAST MASS: 0
JOINT SWELLING: 0
PARESTHESIAS: 0

## 2023-05-25 ASSESSMENT — ACTIVITIES OF DAILY LIVING (ADL)
CURRENT_FUNCTION: TRANSPORTATION REQUIRES ASSISTANCE
CURRENT_FUNCTION: SHOPPING REQUIRES ASSISTANCE

## 2023-05-26 ENCOUNTER — OFFICE VISIT (OUTPATIENT)
Dept: FAMILY MEDICINE | Facility: CLINIC | Age: 68
End: 2023-05-26
Payer: MEDICARE

## 2023-05-26 VITALS
HEIGHT: 66 IN | BODY MASS INDEX: 26.68 KG/M2 | OXYGEN SATURATION: 100 % | HEART RATE: 56 BPM | DIASTOLIC BLOOD PRESSURE: 73 MMHG | SYSTOLIC BLOOD PRESSURE: 119 MMHG | WEIGHT: 166 LBS | RESPIRATION RATE: 16 BRPM | TEMPERATURE: 97.2 F

## 2023-05-26 DIAGNOSIS — Z13.6 CARDIOVASCULAR SCREENING; LDL GOAL LESS THAN 160: ICD-10-CM

## 2023-05-26 DIAGNOSIS — Z00.00 ENCOUNTER FOR MEDICARE ANNUAL WELLNESS EXAM: Primary | ICD-10-CM

## 2023-05-26 DIAGNOSIS — R73.01 IMPAIRED FASTING GLUCOSE: ICD-10-CM

## 2023-05-26 DIAGNOSIS — R42 DIZZINESS: ICD-10-CM

## 2023-05-26 DIAGNOSIS — H40.89 OTHER GLAUCOMA OF BOTH EYES: ICD-10-CM

## 2023-05-26 DIAGNOSIS — E03.8 OTHER SPECIFIED HYPOTHYROIDISM: ICD-10-CM

## 2023-05-26 PROBLEM — R68.2 DRY MOUTH: Status: RESOLVED | Noted: 2021-12-16 | Resolved: 2023-05-26

## 2023-05-26 LAB
ANION GAP SERPL CALCULATED.3IONS-SCNC: 13 MMOL/L (ref 7–15)
BUN SERPL-MCNC: 21 MG/DL (ref 8–23)
CALCIUM SERPL-MCNC: 9.7 MG/DL (ref 8.8–10.2)
CHLORIDE SERPL-SCNC: 103 MMOL/L (ref 98–107)
CHOLEST SERPL-MCNC: 207 MG/DL
CREAT SERPL-MCNC: 1.06 MG/DL (ref 0.51–0.95)
DEPRECATED HCO3 PLAS-SCNC: 24 MMOL/L (ref 22–29)
GFR SERPL CREATININE-BSD FRML MDRD: 57 ML/MIN/1.73M2
GLUCOSE SERPL-MCNC: 94 MG/DL (ref 70–99)
HBA1C MFR BLD: 5.4 % (ref 0–5.6)
HDLC SERPL-MCNC: 81 MG/DL
LDLC SERPL CALC-MCNC: 116 MG/DL
NONHDLC SERPL-MCNC: 126 MG/DL
POTASSIUM SERPL-SCNC: 4.2 MMOL/L (ref 3.4–5.3)
SODIUM SERPL-SCNC: 140 MMOL/L (ref 136–145)
TRIGL SERPL-MCNC: 49 MG/DL
TSH SERPL DL<=0.005 MIU/L-ACNC: 2.47 UIU/ML (ref 0.3–4.2)

## 2023-05-26 PROCEDURE — 0124A COVID-19 BIVALENT 12+ (PFIZER): CPT | Performed by: FAMILY MEDICINE

## 2023-05-26 PROCEDURE — 91312 COVID-19 BIVALENT 12+ (PFIZER): CPT | Performed by: FAMILY MEDICINE

## 2023-05-26 PROCEDURE — 84443 ASSAY THYROID STIM HORMONE: CPT | Performed by: FAMILY MEDICINE

## 2023-05-26 PROCEDURE — 83036 HEMOGLOBIN GLYCOSYLATED A1C: CPT | Performed by: FAMILY MEDICINE

## 2023-05-26 PROCEDURE — 99213 OFFICE O/P EST LOW 20 MIN: CPT | Mod: 25 | Performed by: FAMILY MEDICINE

## 2023-05-26 PROCEDURE — 80048 BASIC METABOLIC PNL TOTAL CA: CPT | Performed by: FAMILY MEDICINE

## 2023-05-26 PROCEDURE — 80061 LIPID PANEL: CPT | Performed by: FAMILY MEDICINE

## 2023-05-26 PROCEDURE — G0439 PPPS, SUBSEQ VISIT: HCPCS | Performed by: FAMILY MEDICINE

## 2023-05-26 PROCEDURE — 36415 COLL VENOUS BLD VENIPUNCTURE: CPT | Performed by: FAMILY MEDICINE

## 2023-05-26 ASSESSMENT — ENCOUNTER SYMPTOMS
COUGH: 0
PARESTHESIAS: 0
JOINT SWELLING: 0
HEADACHES: 1
EYE PAIN: 0
DIARRHEA: 0
FEVER: 0
HEMATOCHEZIA: 0
HEARTBURN: 0
CONSTIPATION: 0
FREQUENCY: 0
SORE THROAT: 0
ABDOMINAL PAIN: 0
MYALGIAS: 1
NERVOUS/ANXIOUS: 1
BREAST MASS: 0
DYSURIA: 0
HEMATURIA: 0
WEAKNESS: 0
CHILLS: 0
NAUSEA: 0
ARTHRALGIAS: 1
DIZZINESS: 1
SHORTNESS OF BREATH: 0
PALPITATIONS: 0

## 2023-05-26 ASSESSMENT — ACTIVITIES OF DAILY LIVING (ADL)
CURRENT_FUNCTION: SHOPPING REQUIRES ASSISTANCE
CURRENT_FUNCTION: TRANSPORTATION REQUIRES ASSISTANCE

## 2023-05-26 ASSESSMENT — PAIN SCALES - GENERAL: PAINLEVEL: MILD PAIN (2)

## 2023-05-26 NOTE — PROGRESS NOTES
"SUBJECTIVE:   Eulalia is a 68 year old who presents for Preventive Visit and some acute concerns.  Impaired fasting glucose Follow-up      Patient is checking blood sugars: not at all    Diabetic concerns: None     Symptoms of hypoglycemia (low blood sugar): none     Paresthesias (numbness or burning in feet) or sores: No      Lab Results   Component Value Date    A1C 5.6 07/29/2016    A1C 5.4 10/19/2015             1. Dizziness  She reports dizziness associated with balance difficulties, complicated by some vision impairment. She had a lot trouble this winter due to the icy weather. She fell once this winter, fell onto sacrum and has had low back pain since, associated with morning stiffness.  She's tried icing the area and stiffness has reduced.      5/26/2023    10:27 AM   Additional Questions   Roomed by Yolanda Pratt   Accompanied by Self   Patient has been advised of split billing requirements and indicates understanding: Yes  Are you in the first 12 months of your Medicare coverage?  No    Healthy Habits:     In general, how would you rate your overall health?  Good    Frequency of exercise:  6-7 days/week    Duration of exercise:  45-60 minutes    Do you usually eat at least 4 servings of fruit and vegetables a day, include whole grains    & fiber and avoid regularly eating high fat or \"junk\" foods?  Yes    Taking medications regularly:  Yes    Medication side effects:  None    Ability to successfully perform activities of daily living:  Transportation requires assistance and shopping requires assistance    Home Safety:  No safety concerns identified    Hearing Impairment:  No hearing concerns    In the past 6 months, have you been bothered by leaking of urine?  No    In general, how would you rate your overall mental or emotional health?  Fair      PHQ-2 Total Score: 1    Additional concerns today:  Yes        Have you ever done Advance Care Planning? (For example, a Health Directive, POLST, or a discussion with a " medical provider or your loved ones about your wishes): No, advance care planning information given to patient to review.  Advanced care planning was discussed at today's visit.       Fall risk  Fallen 2 or more times in the past year?: Yes  Any fall with injury in the past year?: Yes    Cognitive Screening   1) Repeat 3 items (Leader, Season, Table)    2) Clock draw: NORMAL  3) 3 item recall: Recalls 1 object   Results: ABNORMAL clock, 1-2 items recalled: PROBABLE COGNITIVE IMPAIRMENT, **INFORM PROVIDER**    Mini-CogTM Copyright ANTHONY Orr. Licensed by the author for use in [facility]; reprinted with permission (nena@[facility]). All rights reserved.      Do you have sleep apnea, excessive snoring or daytime drowsiness?: no    Reviewed and updated as needed this visit by clinical staff   Tobacco  Allergies  Meds              Reviewed and updated as needed this visit by Provider                 Social History     Tobacco Use     Smoking status: Former     Types: Cigarettes     Quit date: 1997     Years since quittin.4     Smokeless tobacco: Never   Vaping Use     Vaping status: Never Used   Substance Use Topics     Alcohol use: Yes     Alcohol/week: 4.0 - 5.0 standard drinks of alcohol     Types: 4 - 5 Standard drinks or equivalent per week             2023     9:50 AM   Alcohol Use   Prescreen: >3 drinks/day or >7 drinks/week? No         2021    12:19 PM   AUDIT - Alcohol Use Disorders Identification Test - Reproduced from the World Health Organization Audit 2001 (Second Edition)   1.  How often do you have a drink containing alcohol? 4 or more times a week   2.  How many drinks containing alcohol do you have on a typical day when you are drinking? 1 or 2   3.  How often do you have five or more drinks on one occasion? Never   4.  How often during the last year have you found that you were not able to stop drinking once you had started? Never   5.  How often during the last year have  you failed to do what was normally expected of you because of drinking? Never   6.  How often during the last year have you needed a first drink in the morning to get yourself going after a heavy drinking session? Never   7.  How often during the last year have you had a feeling of guilt or remorse after drinking? Never   8.  How often during the last year have you been unable to remember what happened the night before because of your drinking? Never   9.  Have you or someone else been injured because of your drinking? No   10. Has a relative, friend, doctor or other health care worker been concerned about your drinking or suggested you cut down? No   TOTAL SCORE 4     Do you have a current opioid prescription? No  Do you use any other controlled substances or medications that are not prescribed by a provider? None    Current providers sharing in care for this patient include:   Patient Care Team:  Nayana Wheat MD as PCP - General (Family Medicine)  Cydney Montes MD as MD (Ophthalmology)  Nayana Wheat MD as Assigned PCP  Malia Vang NP as Assigned Behavioral Health Provider  Lul Burden MD as MD (Ophthalmology)  Cydney Montes MD as Assigned Surgical Provider    The following health maintenance items are reviewed in Epic and correct as of today:  Health Maintenance   Topic Date Due     ZOSTER IMMUNIZATION (1 of 2) 03/18/2004     DTAP/TDAP/TD IMMUNIZATION (2 - Td or Tdap) 06/26/2019     MEDICARE ANNUAL WELLNESS VISIT  12/16/2022     TSH W/FREE T4 REFLEX  12/16/2022     COVID-19 Vaccine (6 - Moderna series) 01/22/2023     ANNUAL REVIEW OF HM ORDERS  09/01/2023     MAMMO SCREENING  02/10/2024     FALL RISK ASSESSMENT  05/26/2024     COLORECTAL CANCER SCREENING  10/01/2025     LIPID  12/16/2026     ADVANCE CARE PLANNING  12/16/2026     DEXA  02/10/2037     HEPATITIS C SCREENING  Completed     PHQ-2 (once per calendar year)  Completed     INFLUENZA VACCINE   Completed     Pneumococcal Vaccine: 65+ Years  Completed     IPV IMMUNIZATION  Aged Out     MENINGITIS IMMUNIZATION  Aged Out     BP Readings from Last 3 Encounters:   23 119/73   22 112/64   21 134/81    Wt Readings from Last 3 Encounters:   23 75.3 kg (166 lb)   22 70.3 kg (155 lb)   21 70.3 kg (155 lb)                  Patient Active Problem List   Diagnosis     Gastroesophageal reflux disease without esophagitis     Chronic rhinitis     Glaucoma suspect, unspecified laterality     Borderline personality disorder (H)     Bipolar affective disorder (H)     Other specified hypothyroidism     Other insomnia     Prediabetes     Impaired fasting glucose     Dry mouth     Past Surgical History:   Procedure Laterality Date     CATARACT IOL, RT/LT Bilateral      VITRECTOMY ANTERIOR Right 2019    for floaters        Social History     Tobacco Use     Smoking status: Former     Types: Cigarettes     Quit date: 1997     Years since quittin.4     Smokeless tobacco: Never   Vaping Use     Vaping status: Never Used   Substance Use Topics     Alcohol use: Yes     Alcohol/week: 4.0 - 5.0 standard drinks of alcohol     Types: 4 - 5 Standard drinks or equivalent per week     Family History   Problem Relation Age of Onset     Glaucoma Father      Glaucoma Sister      Macular Degeneration No family hx of          Current Outpatient Medications   Medication Sig Dispense Refill     ARIPiprazole (ABILIFY) 5 MG tablet Take 1 tablet (5 mg) by mouth daily 90 tablet 1     aspirin 81 MG EC tablet Take 81 mg by mouth daily  90 tablet 3     atorvastatin (LIPITOR) 20 MG tablet TAKE 1 TABLET BY MOUTH EVERY DAY 90 tablet 0     brimonidine (ALPHAGAN) 0.2 % ophthalmic solution Place 1 drop Into the left eye 2 times daily 10 mL 3     dorzolamide-timolol (COSOPT) 2-0.5 % ophthalmic solution Place 1 drop into both eyes 2 times daily 10 mL 5     hypromellose (GENTEAL SEVERE) ophthalmic gel 0.3% USE once  at night in the eye. Both eyes 10 g 11     lamoTRIgine (LAMICTAL) 150 MG tablet TAKE TWO TABLETS BY MOUTH DAILY. 180 tablet 1     levothyroxine (SYNTHROID, LEVOTHROID) 25 MCG tablet Take 1 tablet (25 mcg) by mouth daily 90 tablet 1     montelukast (SINGULAIR) 10 MG tablet Take 1 tablet (10 mg) by mouth At Bedtime 90 tablet 3     Multiple Minerals-Vitamins (ADVANCED CALCIUM/D/MAGNESIUM) TABS Take 1,500 mg by mouth daily        multivitamin (OCUVITE) TABS Take 1 tablet by mouth daily  30 each 0     omega 3 1000 MG CAPS Take 3 g by mouth daily  90 capsule      traZODone (DESYREL) 100 MG tablet Take 1 tablet (100 mg) by mouth nightly as needed for sleep 90 tablet 3     Allergies   Allergen Reactions     Prednisone      Has glaucoma, potential for ocular pressure spike.  She has never been on steroids.       Recent Labs   Lab Test 12/16/21  1417 09/01/16  0930 07/29/16  0000 10/19/15  0000   A1C  --   --  5.6 5.4   LDL 93 78 102 12   HDL 80 74 80* 51   TRIG 61 82 93 95   ALT 32 23  --   --    CR 0.84 0.94  --   --    GFRESTIMATED 72 60*  --   --    GFRESTBLACK  --  73  --   --    POTASSIUM 4.1 4.5  --   --    TSH 2.14 2.36  --   --       FHS-7:       12/16/2021    12:19 PM 2/10/2022     1:10 PM 5/25/2023     9:53 AM   Breast CA Risk Assessment (FHS-7)   Did any of your first-degree relatives have breast or ovarian cancer? Yes Yes No   Did any of your relatives have bilateral breast cancer? No No No   Did any man in your family have breast cancer? No No No   Did any woman in your family have breast and ovarian cancer? Yes Yes Yes   Did any woman in your family have breast cancer before age 50 y? No No No   Do you have 2 or more relatives with breast and/or ovarian cancer? Yes Yes No   Do you have 2 or more relatives with breast and/or bowel cancer? No No No     Mammogram Screening: Recommended mammography every 1-2 years with patient discussion and risk factor consideration  Pertinent mammograms are reviewed under the  "imaging tab.    Review of Systems   Constitutional: Negative for chills and fever.   HENT: Negative for congestion, ear pain, hearing loss and sore throat.    Eyes: Negative for pain and visual disturbance.   Respiratory: Negative for cough and shortness of breath.    Cardiovascular: Negative for chest pain, palpitations and peripheral edema.   Gastrointestinal: Negative for abdominal pain, constipation, diarrhea, heartburn, hematochezia and nausea.   Breasts:  Negative for tenderness, breast mass and discharge.   Genitourinary: Negative for dysuria, frequency, genital sores, hematuria, pelvic pain, urgency, vaginal bleeding and vaginal discharge.   Musculoskeletal: Positive for arthralgias and myalgias. Negative for joint swelling.   Skin: Negative for rash.   Neurological: Positive for dizziness and headaches. Negative for weakness and paresthesias.   Psychiatric/Behavioral: Negative for mood changes. The patient is nervous/anxious.      OBJECTIVE:   /73 (BP Location: Right arm, Patient Position: Sitting, Cuff Size: Adult Regular)   Pulse 56   Temp 97.2  F (36.2  C) (Tympanic)   Resp 16   Ht 1.676 m (5' 6\")   Wt 75.3 kg (166 lb)   SpO2 100%   BMI 26.79 kg/m   Estimated body mass index is 26.79 kg/m  as calculated from the following:    Height as of this encounter: 1.676 m (5' 6\").    Weight as of this encounter: 75.3 kg (166 lb).  Physical Exam  GENERAL: healthy, alert and no distress  EYES: Eyes grossly normal to inspection, PERRL and conjunctivae and sclerae normal  HENT: ear canals and TM's normal, nose and mouth without ulcers or lesions  NECK: no adenopathy, no asymmetry, masses, or scars and thyroid normal to palpation  RESP: lungs clear to auscultation - no rales, rhonchi or wheezes  CV: regular rate and rhythm, normal S1 S2, no S3 or S4, no murmur, click or rub, no peripheral edema and peripheral pulses strong  MS: no gross musculoskeletal defects noted, no edema  SKIN: no suspicious lesions " "or rashes  NEURO: Normal strength and tone, mentation intact and speech normal  PSYCH: mentation appears normal and anxious    Diagnostic Test Results:  Labs reviewed in Epic  No results found for this or any previous visit (from the past 24 hour(s)).    ASSESSMENT / PLAN:   (Z00.00) Encounter for Medicare annual wellness exam  (primary encounter diagnosis)    (E03.8) Other specified hypothyroidism  Comment:   TSH   Date Value Ref Range Status   12/16/2021 2.14 0.40 - 4.00 mU/L Final   09/01/2016 2.36 0.40 - 4.00 mU/L Final   ]  At goal  The current medical regimen is effective;  continue present plan and medications.    Recheck labs today, Will fu as indicated.   Plan: TSH WITH FREE T4 REFLEX            (R42) Dizziness  Comment: new concern, referred for [email or web address] [email or web address]  Plan: Physical Therapy Referral        Return to clinic if symptoms worsen or progress.     (Z13.6) CARDIOVASCULAR SCREENING; LDL GOAL LESS THAN 160  Comment:   LDL Cholesterol Calculated   Date Value Ref Range Status   12/16/2021 93 <=100 mg/dL Final   09/01/2016 78 <100 mg/dL Final     Comment:     Desirable:       <100 mg/dl      Plan: Lipid panel reflex to direct LDL Fasting        Previously at goal, recheck today    (R73.01) Impaired fasting glucose  Comment: recheck and Will fu as indicated.   Plan: Hemoglobin A1c, Basic metabolic panel  (Ca, Cl,        CO2, Creat, Gluc, K, Na, BUN)            (H40.89) Other glaucoma of both eyes  Comment: noted  Plan: follow up with ophthalmologist as scheduled    Patient has been advised of split billing requirements and indicates understanding: Yes      COUNSELING:  Reviewed preventive health counseling, as reflected in patient instructions      BMI:   Estimated body mass index is 26.79 kg/m  as calculated from the following:    Height as of this encounter: 1.676 m (5' 6\").    Weight as of this encounter: 75.3 kg (166 lb).         She reports that she quit smoking about 26 years ago. Her smoking " use included cigarettes. She has never used smokeless tobacco.      Appropriate preventive services were discussed with this patient, including applicable screening as appropriate for cardiovascular disease, diabetes, osteopenia/osteoporosis, and glaucoma.  As appropriate for age/gender, discussed screening for colorectal cancer, prostate cancer, breast cancer, and cervical cancer. Checklist reviewing preventive services available has been given to the patient.    Reviewed patients plan of care and provided an AVS. The Intermediate Care Plan ( asthma action plan, low back pain action plan, and migraine action plan) for Emperatriz meets the Care Plan requirement. This Care Plan has been established and reviewed with the Patient.          Nayana Wheat MD  [facility]    Identified Health Risks:    I have reviewed Opioid Use Disorder and Substance Use Disorder risk factors and made any needed referrals.

## 2023-05-26 NOTE — PATIENT INSTRUCTIONS
Aveeno, Neutrogena, Roche Le Posay are nice sun screens to use. I recommend daily use of SPF 50 on face and hands.    Shingles vaccine  I strongly recommend getting the shingles vaccine (Shingrix) if you are over age 50, but please check with your insurance to see how much you might have to pay for this vaccine! If you are on most insurance plans including Medicare, it's covered if you get it at a pharmacy but not in a clinic.    This shot will prevent shingles, a painful skin rash that you are at risk for getting if you have ever had chicken pox. Sometimes the pain will last the rest of your life, even after the rash has healed, and there is not much that we can do to relieve the pain. The vaccine is 98% effective at preventing shingles.    Please consider getting this vaccine! You'll need #2 vaccines 2-4 months apart to be protected.  You are also due for a tetanus shot this year.    [facility]  Physical therapy to get you back in the game of life ; vestibular therapy for dizziness/balance.  The [facility], a service of [facility], provides orthopedic and sports physical therapy at over 30 [facility] locations. In addtion to physical therapy, [facility] offers chiropractic and athletic training services.   Appointments [phone] - with or without a physician referral       Patient Education   Personalized Prevention Plan  You are due for the preventive services outlined below.  Your care team is available to assist you in scheduling these services.  If you have already completed any of these items, please share that information with your care team to update in your medical record.  Health Maintenance Due   Topic Date Due    Zoster (Shingles) Vaccine (1 of 2) 03/18/2004    Diptheria Tetanus Pertussis (DTAP/TDAP/TD) Vaccine (2 - Td or Tdap) 06/26/2019    Annual Wellness Visit  12/16/2022    Thyroid Function Lab  12/16/2022    COVID-19 Vaccine (6 - Moderna  series) 01/22/2023

## 2023-05-26 NOTE — RESULT ENCOUNTER NOTE
Thank you very much for getting labs done!     Your thyroid labs are normal, you are on the correct dose of thyroid replacement. Please continue to take this as you were.     Good news, your LDL (or bad cholesterol) is at goal. Your medication is working well. Please continue to take your cholesterol lowering medication, atorvastatin, as you have been doing.    The testing of your blood sugar and electrolytes was normal.  Kidney function is assessed by blood work that measures creatinine and calculates estimated GFR (glomerular filtration rate).  By current criteria you do have stage 3 chronic kidney disease as your eGFR is < 60.  This is not something that is urgent as your value has not changed much with time.    However, it does mean will monitor your labs (urine and blood tests) every year and we will keep trying to make sure your blood pressure and cholesterol are at goal to keep your kidneys as healthy as possible.   If you have any questions, please contact the clinic or schedule an appointment with me, thank you!      Sincerely,  Dr. Nayana Wheat MD  5/26/2023

## 2023-05-26 NOTE — RESULT ENCOUNTER NOTE
Hello! Thank you for getting labs done. Your lab test to check for diabetes, HgA1C, also called glycosylated hemoglobin, which measures the level of sugar in your blood over the past few months, is normal which is great!     Congratulations, you don't have diabetes!  However, since your fasting blood sugars have been high in the past, I will continue to screen your blood sugar every year.     If you have any questions, please contact the clinic or schedule an appointment with me, thank you!    Sincerely,    DALIA ROMEO MD   5/26/2023

## 2023-06-08 ENCOUNTER — VIRTUAL VISIT (OUTPATIENT)
Dept: PSYCHOLOGY | Facility: CLINIC | Age: 68
End: 2023-06-08
Payer: MEDICARE

## 2023-06-08 DIAGNOSIS — F31.31 BIPOLAR AFFECTIVE DISORDER, CURRENTLY DEPRESSED, MILD (H): ICD-10-CM

## 2023-06-08 DIAGNOSIS — F41.1 GAD (GENERALIZED ANXIETY DISORDER): Primary | ICD-10-CM

## 2023-06-08 PROCEDURE — 90791 PSYCH DIAGNOSTIC EVALUATION: CPT | Mod: VID

## 2023-06-08 ASSESSMENT — PATIENT HEALTH QUESTIONNAIRE - PHQ9
SUM OF ALL RESPONSES TO PHQ QUESTIONS 1-9: 1
SUM OF ALL RESPONSES TO PHQ QUESTIONS 1-9: 1

## 2023-06-08 ASSESSMENT — COLUMBIA-SUICIDE SEVERITY RATING SCALE - C-SSRS
3. HAVE YOU BEEN THINKING ABOUT HOW YOU MIGHT KILL YOURSELF?: NO
5. HAVE YOU STARTED TO WORK OUT OR WORKED OUT THE DETAILS OF HOW TO KILL YOURSELF? DO YOU INTEND TO CARRY OUT THIS PLAN?: NO
6. HAVE YOU EVER DONE ANYTHING, STARTED TO DO ANYTHING, OR PREPARED TO DO ANYTHING TO END YOUR LIFE?: NO
4. HAVE YOU HAD THESE THOUGHTS AND HAD SOME INTENTION OF ACTING ON THEM?: NO
2. HAVE YOU ACTUALLY HAD ANY THOUGHTS OF KILLING YOURSELF IN THE PAST MONTH?: NO
1. IN THE PAST MONTH, HAVE YOU WISHED YOU WERE DEAD OR WISHED YOU COULD GO TO SLEEP AND NOT WAKE UP?: NO
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
## 2023-06-09 ENCOUNTER — MYC MEDICAL ADVICE (OUTPATIENT)
Dept: FAMILY MEDICINE | Facility: CLINIC | Age: 68
End: 2023-06-09
Payer: MEDICARE

## 2023-06-09 DIAGNOSIS — M54.50 CHRONIC BILATERAL LOW BACK PAIN WITHOUT SCIATICA: Primary | ICD-10-CM

## 2023-06-09 DIAGNOSIS — G89.29 CHRONIC BILATERAL LOW BACK PAIN WITHOUT SCIATICA: Primary | ICD-10-CM

## 2023-06-09 DIAGNOSIS — Z12.31 ENCOUNTER FOR SCREENING MAMMOGRAM FOR BREAST CANCER: Primary | ICD-10-CM

## 2023-06-19 ENCOUNTER — OFFICE VISIT (OUTPATIENT)
Dept: OPHTHALMOLOGY | Facility: CLINIC | Age: 68
End: 2023-06-19
Attending: OPHTHALMOLOGY
Payer: MEDICARE

## 2023-06-19 DIAGNOSIS — H40.89 OTHER SPECIFIED GLAUCOMA, UNSPECIFIED LATERALITY: Primary | ICD-10-CM

## 2023-06-19 DIAGNOSIS — H40.003 GLAUCOMA SUSPECT OF BOTH EYES: ICD-10-CM

## 2023-06-19 DIAGNOSIS — H35.371 EPIRETINAL MEMBRANE (ERM) OF RIGHT EYE: ICD-10-CM

## 2023-06-19 PROCEDURE — 92133 CPTRZD OPH DX IMG PST SGM ON: CPT | Performed by: OPHTHALMOLOGY

## 2023-06-19 PROCEDURE — 92083 EXTENDED VISUAL FIELD XM: CPT | Performed by: OPHTHALMOLOGY

## 2023-06-19 PROCEDURE — 92134 CPTRZ OPH DX IMG PST SGM RTA: CPT | Performed by: OPHTHALMOLOGY

## 2023-06-19 PROCEDURE — G0463 HOSPITAL OUTPT CLINIC VISIT: HCPCS | Performed by: OPHTHALMOLOGY

## 2023-06-19 PROCEDURE — 99214 OFFICE O/P EST MOD 30 MIN: CPT | Performed by: OPHTHALMOLOGY

## 2023-06-19 PROCEDURE — 99207 OCT OPTIC NERVE RNFL SPECTRALIS OU (BOTH EYES): CPT | Mod: 26 | Performed by: OPHTHALMOLOGY

## 2023-06-19 ASSESSMENT — VISUAL ACUITY
OS_CC+: -1
CORRECTION_TYPE: GLASSES
OD_CC: 20/25
OS_CC: 20/30
METHOD: SNELLEN - LINEAR

## 2023-06-19 ASSESSMENT — REFRACTION_WEARINGRX
OS_CYLINDER: +1.50
OS_AXIS: 155
OD_SPHERE: -4.75
OD_AXIS: 175
OS_SPHERE: -6.25
OS_ADD: +2.75
SPECS_TYPE: PAL
OD_ADD: +2.75
OD_CYLINDER: +1.00

## 2023-06-19 ASSESSMENT — CUP TO DISC RATIO
OD_RATIO: 0.3
OS_RATIO: 0.4

## 2023-06-19 ASSESSMENT — SLIT LAMP EXAM - LIDS
COMMENTS: NORMAL
COMMENTS: NORMAL

## 2023-06-19 ASSESSMENT — EXTERNAL EXAM - RIGHT EYE: OD_EXAM: NORMAL

## 2023-06-19 ASSESSMENT — TONOMETRY
OS_IOP_MMHG: 19
IOP_METHOD: TONOPEN
OD_IOP_MMHG: 20

## 2023-06-19 ASSESSMENT — EXTERNAL EXAM - LEFT EYE: OS_EXAM: NORMAL

## 2023-06-19 NOTE — PROGRESS NOTES
CC: retina and glaucoma consult    HPI: Emperatriz Esteban is a  68 year old patient with history of glaucoma and cataract surgery.  Moved from [place] 2.5 months ago and she is here to establish eye care. Reports chronic progressive decreased vision both eyes. Quite driving in may because of difficulties driving at night. Also resport chronic poor night vision. 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 History of injections x 3 2016 and 2017    Retinal Imaging:  ONFL 06/19/23   RE: within normal limits   LE: unable to obtain  Prior - inferior temporal thinning stable from 6/2021    Gtop 06/19/23   Right eye: non reliable FP 4/9; scatter spots of decreased sensitivity. MD -4.4 (prior MD -6.1)  Left eye: non reliable; sup arcuate MD -10. (prior MD -14.9)    OCT MAC 06/19/23   Right eye: Epiretinal membrane with lamellar hole stable; intraretinal fluid stable; subfoveal IS/OS disruption  Left eye: thin retina with mild irregularity of the retina surface    Optos Fundus 3/7/2022  Right Eye - clear media, dense lens phymosis obscuring peripheral view, PPA, flat macula, peripheral laser  Left eye - clear media, peripheral laser, PPA, flat macula     Autofluorescence: 03/07/22  PP Autofluorescence and peripheral areas of hypoautofluorescence       Assessment & Plan:    # history of myopic degeneration  - Shallow posterior staphylomas both eyes  - Retina detachment precautions were discussed with the patient (presence or increased in flashes, floaters or a curtain in the visual field) and was asked to return if any of the those occur      # Primary open angle glaucoma (POAG) both eyes    Advance left eye.   ONFL correlated thinning with arcuate visual field defect    Gonio: open 360 to CB both eyes   K pachy:  539/570  Asthma/COPD: No  Steroid Use: No    Kidney Stones: No    Sulfa Allergy:      No   Tmax: unknown  Today 20/19  - difficult to assess for progression because of  Patient has anxiety with VF testing and retinal nerve fiber layer are variable  Given intraocular pressure not at goal recommend eyedrops as below    # partial thickness Macula hole right eye   Stable - observe    # history of Dry eye syndrome   Patient complaining of dry eyes and pain after application of dilating eyedrops  -in the past used serum eyedrops. Currently uses it on and off.  - today punctate epithelial erosions.  - Patient states serum tears has worked well for her.   - per patient couldn't afford restasis.  - she's now on serum eye drops (seen by Dr. Burden  - warm compresses, lid hygiene, humidification and fish oil recommended  - Seeing Dr. Ryder     # pseudophakic both eyes   Stable    # history of vitrectomy left eye   - floaterectomy  By Dr. Henok Bolton in [facility]    PLAN  - follow up in 4 months with glaucoma   - PFAT as needed   Cosopt BID each eye   Brimonidine BID left eye and start right eye   Follow up with glaucoma next available  Follow up with retina in one yr; sooner as needed              ~~~~~~~~~~~~~~~~~~~~~~~~~~~~~~~~~~   Complete documentation of historical and exam elements from today's encounter can be found in the full encounter summary report (not reduplicated in this progress note).  I personally obtained the chief complaint(s) and history of present illness.  I confirmed and edited as necessary the review of systems, past medical/surgical history, family history, social history, and examination findings as documented by others; and I examined the patient myself.  I personally reviewed the relevant tests, images, and reports as  documented above.  I personally reviewed the ophthalmic test(s) associated with this encounter, agree with the interpretation(s) as documented by the resident/fellow, and have edited the corresponding report(s) as necessary.   I formulated and edited as necessary the assessment and plan and discussed the findings and management plan with the patient and family    Cydney Montes MD   of Ophthalmology.  Retina Service   Department of Ophthalmology and Visual Neurosciences   [facility]  Phone: [phone]   Fax: [phone]

## 2023-06-19 NOTE — NURSING NOTE
Patient states vision has been stable, denies pain/floaters/flashes. Both eyes   Does report couple weeks ago, accidentally jabbed left eye with mascara where would excessively tear that day, gradually subsided afterworlds.   Compliant with drops and taking as prescribed;     Brimonidine BID each eye lgtts 7 AM  Cosopt BID each eye lgtts 7:15 AM '    DONNELL Magaña, June 19, 2023

## 2023-06-22 NOTE — TELEPHONE ENCOUNTER
Dr. Wheat --    PENDED referral below for the Neuro consult.     From Swank message:   Patient would like to go to the [facility] in [place]. Patient is stating balance issues. They require a referral for Medicare patients.    Phone number: [phone].    FAX: [phone].    Thanks,   ARLENE MartinezN CHUCHO  [facility]

## 2023-06-23 NOTE — TELEPHONE ENCOUNTER
Patient called wanting clarification:     1. Mammogram. Referral was placed now but do not need one until February 2024.   2. Colonoscopy. Not due until 2024.   3. [facility]. [facility].   Appointments: [phone]  4. Neuro Referral. Placed for [facility] in [place].  They require a referral for Medicare patients. Phone number: [phone].    Writer went over all of this with patient.   Patient seemed more calm after conversation but still slightly frustrated.     Mara Boyer, BSN RN  [facility]

## 2023-07-06 ENCOUNTER — VIRTUAL VISIT (OUTPATIENT)
Dept: PSYCHOLOGY | Facility: CLINIC | Age: 68
End: 2023-07-06
Payer: MEDICARE

## 2023-07-06 DIAGNOSIS — F31.31 BIPOLAR AFFECTIVE DISORDER, CURRENTLY DEPRESSED, MILD (H): ICD-10-CM

## 2023-07-06 DIAGNOSIS — F41.1 GAD (GENERALIZED ANXIETY DISORDER): Primary | ICD-10-CM

## 2023-07-06 PROCEDURE — 90834 PSYTX W PT 45 MINUTES: CPT | Mod: VID

## 2023-07-06 NOTE — PROGRESS NOTES
[facility]                                     Progress Note    Patient Name: Emperatriz Esteban  Date: 7/6/23    Start:  1: 05 pm End: 1:50 pm      Session Length: 45 min    Session #: 2    Attendees: Client attended alone    Service Modality:  Video Visit:      Provider verified identity through the following two step process.  Patient provided:  Patient is known previously to provider    Telemedicine Visit: The patient's condition can be safely assessed and treated via synchronous audio and visual telemedicine encounter.      Reason for Telemedicine Visit: Patient convenience (e.g. access to timely appointments / distance to available provider)    Originating Site (Patient Location): Patient's home    Distant Site (Provider Location): Provider Remote Setting- Home Office    Consent:  The patient/guardian has verbally consented to: the potential risks and benefits of telemedicine (video visit) versus in person care; bill my insurance or make self-payment for services provided; and responsibility for payment of non-covered services.     Patient would like the video invitation sent by:  My Chart    Mode of Communication:  Video Conference via Amwell    Distant Location (Provider):  Off-site    As the provider I attest to compliance with applicable laws and regulations related to telemedicine.    DATA  Interactive Complexity: No  Crisis: No        Progress Since Last Session (Related to Symptoms / Goals / Homework):   Symptoms: No change continued symptoms    Homework: Did not complete second session      Episode of Care Goals: No improvement - CONTEMPLATION (Considering change and yet undecided); Intervened by assessing the negative and positive thinking (ambivalence) about behavior change     Current / Ongoing Stressors and Concerns:   Caregiver stressor-relative cousin Nadine with dementia. Relational strain with wife who lives in [place] and with whom pt wants to end relationship. Recognition that  Bipolar doesn't go away-gets startled easily, overreacts emotionally. Notices stomach aches, tight jaw, tension, reactivity, rumination.      Treatment Objective(s) Addressed in This Session:   identify and utilize  strategies for more effectively managing Bipolar Disorder       Intervention:   DBT: Offered TIP and WISE Mind skills , practiced in session   CBT:  Shared grounding skills   Provided active listening and validation. Enlisted rapport building and exploration of values and needs.    Assessments completed prior to visit:  The following assessments were completed by patient for this visit:  PHQ2:       5/25/2023     9:51 AM 4/24/2023     1:14 PM 1/23/2023     1:44 PM 12/16/2021    12:19 PM 8/13/2021    10:53 AM 7/26/2021    10:15 AM 9/1/2016     2:57 PM   PHQ-2 ( 1999 [employer])   Q1: Little interest or pleasure in doing things 0 0 0 0 0 0 1   Q2: Feeling down, depressed or hopeless 1 0 0 1 0 0 1   PHQ-2 Score 1 0 0 1 0 0 2   PHQ-2 Total Score (12-17 Years)- Positive if 3 or more points; Administer PHQ-A if positive     0 0    Q1: Little interest or pleasure in doing things Not at all  Not at all Not at all      Q2: Feeling down, depressed or hopeless Several days  Not at all Several days      PHQ-2 Score 1  0 1        PHQ9:       2/14/2022    11:53 AM 12/12/2022    11:00 AM 1/10/2023     4:37 PM 1/25/2023     1:08 PM 4/24/2023    10:29 AM 6/8/2023    12:24 PM   PHQ-9 SCORE   PHQ-9 Total Score MyChart 3 (Minimal depression) 3 (Minimal depression)  1 (Minimal depression) 1 (Minimal depression) 1 (Minimal depression)   PHQ-9 Total Score 3    3 3 3 1 1 1     GAD2:       4/27/2022     9:01 AM 12/12/2022    11:01 AM 1/23/2023     1:46 PM 4/24/2023    10:29 AM 6/8/2023    12:46 PM   VAL-2   Feeling nervous, anxious, or on edge 1 1 3 0 1   Not being able to stop or control worrying 1 1 1 0 0   VAL-2 Total Score 2 2 4 0 1     GAD7:       1/10/2023     4:37 PM 1/23/2023     1:46 PM   VAL-7 SCORE   Total Score  7 (mild  anxiety)   Total Score 9 7     CAGE-AID:       12/20/2022    10:56 AM 1/25/2023     1:09 PM   CAGE-AID Total Score   Total Score 2    Total Score MyChart 2 (A total score of 2 or greater is considered clinically significant)        Information is confidential and restricted. Go to Review Flowsheets to unlock data.     PROMIS 10-Global Health (only subscores and total score):       4/27/2022     9:02 AM 12/12/2022    11:02 AM 1/23/2023     1:48 PM 4/24/2023    10:30 AM 6/8/2023    12:48 PM   PROMIS-10 Scores Only   Global Mental Health Score 12 14 16  16   Global Physical Health Score 17 17 19  17   PROMIS TOTAL - SUBSCORES 29 31 35  33       Information is confidential and restricted. Go to Review Flowsheets to unlock data.     [place] Suicide Severity Rating Scale (Lifetime/Recent)      6/8/2023     1:00 PM   [facility] Suicide Severity Rating (Lifetime/Recent)   Q1 Wished to be Dead (Past Month) no   Q2 Suicidal Thoughts (Past Month) no   Q3 Suicidal Thought Method no   Q4 Suicidal Intent without Specific Plan no   Q5 Suicide Intent with Specific Plan no   Q6 Suicide Behavior (Lifetime) no   Level of Risk per Screen low risk         ASSESSMENT: Current Emotional / Mental Status (status of significant symptoms):   Risk status (Self / Other harm or suicidal ideation)   Patient denies current fears or concerns for personal safety.   Patient denies current or recent suicidal ideation or behaviors.   Patient denies current or recent homicidal ideation or behaviors.   Patient denies current or recent self injurious behavior or ideation.   Patient denies other safety concerns.   Patient reports there has been no change in risk factors since their last session.     Patient reports there has been no change in protective factors since their last session.     Recommended that patient call 911 or go to the local ED should there be a change in any of these risk factors.     Appearance:   Appropriate    Eye Contact:   Good     Psychomotor Behavior: Normal    Attitude:   Cooperative    Orientation:   All   Speech    Rate / Production: Normal     Volume:  Normal    Mood:    Anxious  Depressed    Affect:    Appropriate    Thought Content:  Clear    Thought Form:  Coherent  Logical    Insight:    Good      Medication Review:   No changes to current psychiatric medication(s)     Medication Compliance:   Yes     Changes in Health Issues:   None reported     Chemical Use Review:   Substance Use: increase in alcohol .  Patient reports frequency of use 1-3 shots of vodka or wine at a time, impacting balance and functioning.  Patient assessed present costs and future losses as a result of substance use  Reviewed concerns related to health related substance abuse risk  Provided encouragement towards sobriety        Tobacco Use: No current tobacco use.      Diagnosis:  1. VAL (generalized anxiety disorder)        Collateral Reports Completed:   Not Applicable    PLAN: (Patient Tasks / Therapist Tasks / Other)  Use WISE Mind and Grounding Skills        CUATE Mcdonald 7/6/2023                                                           ______________________________________________________________________

## 2023-07-11 ENCOUNTER — THERAPY VISIT (OUTPATIENT)
Dept: PHYSICAL THERAPY | Facility: CLINIC | Age: 68
End: 2023-07-11
Attending: FAMILY MEDICINE
Payer: MEDICARE

## 2023-07-11 DIAGNOSIS — M54.50 CHRONIC BILATERAL LOW BACK PAIN WITHOUT SCIATICA: ICD-10-CM

## 2023-07-11 DIAGNOSIS — G89.29 CHRONIC BILATERAL LOW BACK PAIN WITHOUT SCIATICA: ICD-10-CM

## 2023-07-11 PROCEDURE — 97161 PT EVAL LOW COMPLEX 20 MIN: CPT | Mod: GP | Performed by: PHYSICAL THERAPIST

## 2023-07-11 PROCEDURE — 97110 THERAPEUTIC EXERCISES: CPT | Mod: GP | Performed by: PHYSICAL THERAPIST

## 2023-07-11 NOTE — PROGRESS NOTES
PHYSICAL THERAPY EVALUATION  Type of Visit: Evaluation    See electronic medical record for Abuse and Falls Screening details.    Subjective      Presenting condition or subjective complaint: Low back pain with gradual onset in 03/23/23. Pt does recall a previous fall on her tailbone in 02/2023 which may have started the process but denies any acute pain after the fall.  Date of onset:      Relevant medical history: Depression; Asthma; Menopause; Mental Illness; Osteoporosis; Stroke   Dates & types of surgery: 2019 eye surgery    Prior diagnostic imaging/testing results:       Prior therapy history for the same diagnosis, illness or injury: No      Prior Level of Function   Transfers: Independent  Ambulation: Independent  ADL: Independent      Living Environment  Social support: Alone   Type of home: Apartment/condo   Stairs to enter the home: No   Is there a railing: No   Ramp: No   Stairs inside the home: No       Help at home: Home and Yard maintenance tasks  Equipment owned:       Employment:   retired  Hobbies/Interests: walking 10,000 steps per day, snf community activities    Patient goals for therapy: Getting out of bed in the morning    Pain assessment: See objective evaluation for additional pain details     Objective   LUMBAR SPINE EVALUATION  PAIN: Pain Level at Rest: 1/10  Pain Level with Use: 7/10  Pain Location: lumbar spine and hip  Pain Quality: Aching  Pain Frequency: intermittent  Pain is Worst: daytime or morning   Pain is Exacerbated By: getting up in the morning   Pain is Relieved By: cold and NSAIDs  Pain Progression: Worsened  INTEGUMENTARY (edema, incisions):   POSTURE: Sitting Posture: Rounded shoulders, Forward head, Lordosis decreased  GAIT:   Weightbearing Status:   Assistive Device(s):   Gait Deviations:   BALANCE/PROPRIOCEPTION: Single Leg Stance Eyes Open (seconds): <3 sec   WEIGHTBEARING ALIGNMENT:   NON-WEIGHTBEARING ALIGNMENT:    ROM:   (Degrees) Left AROM Left PROM  Right  AROM Right PROM   Hip Flexion       Hip Extension       Hip Abduction       Hip Adduction       Hip Internal Rotation       Hip External Rotation       Knee Flexion       Knee Extension       Lumbar Side glide Mod loss, pain ++ Mod loss, pain ++   Lumbar Flexion Min loss, painfree   Lumbar Extension Mod loss, pain +   Pain:   End feel:     PELVIC/SI SCREEN:   STRENGTH: Bilateral Hip Abd: 4/5     MYOTOMES: WNL  DTR S:   CORD SIGNS:   DERMATOMES:   NEURAL TENSION: Lumbar WNL  FLEXIBILITY: Decreased hip IR L, Decreased hamstrings L, Decreased hip IR R, Decreased hamstrings R  LUMBAR/HIP Special Tests:    PELVIS/SI SPECIAL TESTS:   FUNCTIONAL TESTS:   PALPATION: TTP at paraspinals  SPINAL SEGMENTAL CONCLUSIONS: hypomobile L4-5      Assessment & Plan   CLINICAL IMPRESSIONS   Medical Diagnosis: Chronic LBP w/o Sciatica    Treatment Diagnosis: LBP   Impression/Assessment: Patient is a 68 year old female with LBP complaints.  The following significant findings have been identified: Pain, Decreased ROM/flexibility, Decreased joint mobility, Decreased strength and Impaired balance. These impairments interfere with their ability to perform self care tasks, work tasks, recreational activities, household chores, household mobility and community mobility as compared to previous level of function.     Clinical Decision Making (Complexity):   Clinical Presentation: Stable/Uncomplicated  Clinical Presentation Rationale: based on medical and personal factors listed in PT evaluation  Clinical Decision Making (Complexity): Low complexity    PLAN OF CARE  Treatment Interventions:  Interventions: Manual Therapy, Neuromuscular Re-education, Therapeutic Activity, Therapeutic Exercise    Long Term Goals     PT Goal 1  Goal Identifier: Transfers  Goal Description: Pt will be able to transfer supine to standing w/o increased LBP  Rationale: to maximize safety and independence with performance of ADLs and functional tasks;to maximize safety  and independence within the home  Goal Progress: Pain 7/10 in A.M with transfers  Target Date: 09/05/23      Frequency of Treatment: 2x month  Duration of Treatment: 3 months    Recommended Referrals to Other Professionals:   Education Assessment:   Learner/Method: Patient;No Barriers to Learning    Risks and benefits of evaluation/treatment have been explained.   Patient/Family/caregiver agrees with Plan of Care.     Evaluation Time:     PT Eval, Low Complexity Minutes (19772): 25      Signing Clinician: ADRIANA [facility]                                                                                   OUTPATIENT PHYSICAL THERAPY      PLAN OF TREATMENT FOR OUTPATIENT REHABILITATION   Patient's Last Name, First Name, DONNELLJESSENIA EstebanEmperatriz  M YOB: 1955   Provider's Name   [facility]   Medical Record No.  [id number]     Onset Date:    Start of Care Date: 07/11/23     Medical Diagnosis:  Chronic LBP w/o Sciatica      PT Treatment Diagnosis:  LBP Plan of Treatment  Frequency/Duration: 2x month/ 3 months    Certification date from 07/11/23 to 10/03/23         See note for plan of treatment details and functional goals     Flaco Adames, PT                         I CERTIFY THE NEED FOR THESE SERVICES FURNISHED UNDER        THIS PLAN OF TREATMENT AND WHILE UNDER MY CARE     (Physician attestation of this document indicates review and certification of the therapy plan).                  Referring Provider:  Nayana Wheat      Initial Assessment  See Epic Evaluation- Start of Care Date: 07/11/23

## 2023-07-19 ENCOUNTER — OFFICE VISIT (OUTPATIENT)
Dept: OPHTHALMOLOGY | Facility: CLINIC | Age: 68
End: 2023-07-19
Attending: OPHTHALMOLOGY
Payer: MEDICARE

## 2023-07-19 DIAGNOSIS — Z96.1 PSEUDOPHAKIA OF BOTH EYES: ICD-10-CM

## 2023-07-19 DIAGNOSIS — H35.371 EPIRETINAL MEMBRANE (ERM) OF RIGHT EYE: ICD-10-CM

## 2023-07-19 DIAGNOSIS — H35.341 MACULAR HOLE OF RIGHT EYE: ICD-10-CM

## 2023-07-19 DIAGNOSIS — H44.2B3 BOTH EYES AFFECTED BY DEGENERATIVE MYOPIA WITH MACULAR HOLE: ICD-10-CM

## 2023-07-19 DIAGNOSIS — H40.1130 PRIMARY OPEN ANGLE GLAUCOMA OF BOTH EYES, UNSPECIFIED GLAUCOMA STAGE: Primary | ICD-10-CM

## 2023-07-19 DIAGNOSIS — H04.123 DRY EYES, BILATERAL: ICD-10-CM

## 2023-07-19 PROCEDURE — 92133 CPTRZD OPH DX IMG PST SGM ON: CPT | Performed by: OPHTHALMOLOGY

## 2023-07-19 PROCEDURE — 99214 OFFICE O/P EST MOD 30 MIN: CPT | Mod: GC | Performed by: OPHTHALMOLOGY

## 2023-07-19 PROCEDURE — G0463 HOSPITAL OUTPT CLINIC VISIT: HCPCS | Performed by: OPHTHALMOLOGY

## 2023-07-19 RX ORDER — BRIMONIDINE TARTRATE 2 MG/ML
1 SOLUTION/ DROPS OPHTHALMIC 2 TIMES DAILY
Qty: 30 ML | Refills: 3 | Status: SHIPPED | OUTPATIENT
Start: 2023-07-19 | End: 2024-04-19

## 2023-07-19 RX ORDER — DORZOLAMIDE HYDROCHLORIDE AND TIMOLOL MALEATE 20; 5 MG/ML; MG/ML
1 SOLUTION/ DROPS OPHTHALMIC 2 TIMES DAILY
Qty: 30 ML | Refills: 4 | Status: SHIPPED | OUTPATIENT
Start: 2023-07-19 | End: 2023-08-04

## 2023-07-19 ASSESSMENT — VISUAL ACUITY
METHOD: SNELLEN - LINEAR
CORRECTION_TYPE: GLASSES
OD_CC: 20/30
OS_CC+: -2
OS_CC: 20/30

## 2023-07-19 ASSESSMENT — CONF VISUAL FIELD
OD_SUPERIOR_TEMPORAL_RESTRICTION: 0
OS_SUPERIOR_NASAL_RESTRICTION: 0
OS_INFERIOR_NASAL_RESTRICTION: 0
OS_SUPERIOR_TEMPORAL_RESTRICTION: 0
OD_INFERIOR_TEMPORAL_RESTRICTION: 0
OD_NORMAL: 1
OD_INFERIOR_NASAL_RESTRICTION: 0
OS_NORMAL: 1
OD_SUPERIOR_NASAL_RESTRICTION: 0
OS_INFERIOR_TEMPORAL_RESTRICTION: 0

## 2023-07-19 ASSESSMENT — CUP TO DISC RATIO
OD_RATIO: 0.3
OS_RATIO: 0.4

## 2023-07-19 ASSESSMENT — REFRACTION_WEARINGRX
OD_AXIS: 175
OS_ADD: +2.75
OS_AXIS: 155
SPECS_TYPE: PAL
OS_SPHERE: -6.25
OS_CYLINDER: +1.50
OD_SPHERE: -4.75
OD_CYLINDER: +1.00
OD_ADD: +2.75

## 2023-07-19 ASSESSMENT — TONOMETRY
OD_IOP_MMHG: 12
OS_IOP_MMHG: 14
IOP_METHOD: APPLANATION MB
OS_IOP_MMHG: 15
OD_IOP_MMHG: 16
IOP_METHOD: TONOPEN

## 2023-07-19 ASSESSMENT — EXTERNAL EXAM - RIGHT EYE: OD_EXAM: NORMAL

## 2023-07-19 ASSESSMENT — EXTERNAL EXAM - LEFT EYE: OS_EXAM: NORMAL

## 2023-07-19 NOTE — LETTER
7/19/2023       RE: Emperatriz Esteban  [address]  [facility] [address]     Dear Cydney and Jose,    Thank you for referring your patient, Emperatriz Esteban, to the [facility] [place] at [facility]. Please see a copy of my visit note below.    Chief Complaint(s) and History of Present Illness(es)     Glaucoma Evaluation            Laterality: both eyes          Comments    Pt. States that she is doing well. No change in VA BE. No pain BE. Does   have a lot of dryness BE. No flashes or floaters BE. Family history of   glaucoma in father.   Joana Rivers COT 2:32 PM July 19, 2023        Patient with history of POAG. On Brimonidine and Cosopt. Follows with Dr. Montes for myopic degeneration, partial thickness macular hole in the right eye. Follows with Dr. Ryder for dry eyes. Previously lived in [facility] (Dr. Santos?).         Review of systems for the eyes was negative other than the pertinent positives/negatives listed in the HPI.      Assessment & Plan     Emperatriz Esteban is a 68 year old female with the following diagnoses:   1. Primary open angle glaucoma of both eyes, unspecified glaucoma stage    2. Pseudophakia of both eyes    3. Epiretinal membrane (ERM) of right eye    4. Macular hole of right eye    5. Dry eyes, bilateral    6. 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 Montes for glaucoma evaluation  Has been on drops for many years.  Tolerating brimonidine and Cosopt well.  Previous drops tried were too expensive (unsure what type)  Followed in [place], [place] for many years (records requested)    (+) family history - father  Gonio: open 360 to CBB both eyes on previous exam (unable to tolerate today)  K pachy:  539/570  Asthma/COPD: No  Steroid Use: No    Kidney Stones: No    Sulfa Allergy:      No   Tmax: unknown  Hx of vitrectomy for floaters left eye    IOP today 16/15  Variability in  previous VF and RNFL testing due to anxiety.  OCT with limited quality scans secondary to high myopia/dense Peripapillary atrophy.  BMO-MRW more reliable than GCanalysis or Nerve fiber layer, better scan obtained today    Intraocular pressure acceptable for now  Goal teens both eyes   Continue brimonidine and Cosopt as before    Patient disposition:   Return in about 3 months (around 10/19/2023) for VT, OCT NFL with BMO, LVC.       Maribel Fermin MD  Resident Physician, PGY-3  Department of Ophthalmology      Attending Physician Attestation:  Complete documentation of historical and exam elements from today's encounter can be found in the full encounter summary report (not reduplicated in this progress note).  I personally obtained the chief complaint(s) and history of present illness.  I confirmed and edited as necessary the review of systems, past medical/surgical history, family history, social history, and examination findings as documented by others; and I examined the patient myself.  I personally reviewed the relevant tests, images, and reports as documented above.  I formulated and edited as necessary the assessment and plan and discussed the findings and management plan with the patient and family. .Attending Physician Image/Tesing Attestation: I personally reviewed the ophthalmic test(s) associated with this encounter, agree with the interpretation(s) as documented by the resident/fellow, and have edited the corresponding report(s) as necessary.   - Lul Mirza MD         Main Ophthalmology Exam       External Exam         Right Left    External Normal Normal              Slit Lamp Exam         Right Left    Lids/Lashes Normal, no periocular dermatitis Normal, no periocular dermatitis    Conjunctiva/Sclera White and quiet, no papillae White and quiet, no papillae    Cornea few scattered PEE, makeup in tear film, no k spindles inferior PEE, makeup in tear film    Anterior Chamber Deep and quiet Deep and  quiet    Iris Round and reactive, no TIDS Round and reactive, no TIDS    Lens CEIOL CEIOL    Vitreous Normal Normal              Fundus Exam         Right Left    Disc severe ppa but disc healthy NRR severe ppa, healthy disc and NRR    C/D Ratio 0.3 0.4                  Base Eye Exam       Visual Acuity (Snellen - Linear)         Right Left    Dist cc 20/30 20/30 -2    Dist ph cc NI NI      Correction: Glasses              Tonometry (Tonopen, 2:36 PM)         Right Left    Pressure 12 14              Tonometry #2 (Applanation MB, 4:17 PM)         Right Left    Pressure 16 15              Pupils         Pupils React APD    Right PERRL Brisk None    Left PERRL Brisk None              Visual Fields         Left Right     Full Full              Extraocular Movement         Right Left     Full Full              Neuro/Psych       Oriented x3: Yes    Mood/Affect: Normal                  Slit Lamp and Fundus Exam       External Exam         Right Left    External Normal Normal              Slit Lamp Exam         Right Left    Lids/Lashes Normal, no periocular dermatitis Normal, no periocular dermatitis    Conjunctiva/Sclera White and quiet, no papillae White and quiet, no papillae    Cornea few scattered PEE, makeup in tear film, no k spindles inferior PEE, makeup in tear film    Anterior Chamber Deep and quiet Deep and quiet    Iris Round and reactive, no TIDS Round and reactive, no TIDS    Lens CEIOL CEIOL    Vitreous Normal Normal              Fundus Exam         Right Left    Disc severe ppa but disc healthy NRR severe ppa, healthy disc and NRR    C/D Ratio 0.3 0.4                  Refraction       Wearing Rx         Sphere Cylinder Axis Add    Right -4.75 +1.00 175 +2.75    Left -6.25 +1.50 155 +2.75      Type: PAL                    Again, thank you for allowing me to participate in the care of your patient.      Sincerely,    Lul Mirza MD

## 2023-07-19 NOTE — NURSING NOTE
Chief Complaints and History of Present Illnesses   Patient presents with     Glaucoma Evaluation     Chief Complaint(s) and History of Present Illness(es)     Glaucoma Evaluation            Laterality: both eyes          Comments    Pt. States that she is doing well. No change in VA BE. No pain BE. Does have a lot of dryness BE. No flashes or floaters BE. Family history of glaucoma in father.   Joana Rivers COT 2:32 PM July 19, 2023

## 2023-07-19 NOTE — PROGRESS NOTES
Chief Complaint(s) and History of Present Illness(es)     Glaucoma Evaluation            Laterality: both eyes          Comments    Pt. States that she is doing well. No change in VA BE. No pain BE. Does   have a lot of dryness BE. No flashes or floaters BE. Family history of   glaucoma in father.   Joana Rivers COT 2:32 PM July 19, 2023        Patient with history of POAG. On Brimonidine and Cosopt. Follows with Dr. Montes for myopic degeneration, partial thickness macular hole in the right eye. Follows with Dr. Ryder for dry eyes. Previously lived in [facility] (Dr. Santos?).         Review of systems for the eyes was negative other than the pertinent positives/negatives listed in the HPI.      Assessment & Plan      Emperatriz Esteban is a 68 year old female with the following diagnoses:   1. Primary open angle glaucoma of both eyes, unspecified glaucoma stage    2. Pseudophakia of both eyes    3. Epiretinal membrane (ERM) of right eye    4. Macular hole of right eye    5. Dry eyes, bilateral    6. 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 Montes for glaucoma evaluation  Has been on drops for many years.  Tolerating brimonidine and Cosopt well.  Previous drops tried were too expensive (unsure what type)  Followed in [place], [place] for many years (records requested)    (+) family history - father  Gonio: open 360 to CBB both eyes on previous exam (unable to tolerate today)  K pachy:  539/570  Asthma/COPD: No  Steroid Use: No    Kidney Stones: No    Sulfa Allergy:      No   Tmax: unknown  Hx of vitrectomy for floaters left eye    IOP today 16/15  Variability in previous VF and RNFL testing due to anxiety.  OCT with limited quality scans secondary to high myopia/dense Peripapillary atrophy.  BMO-MRW more reliable than GCanalysis or Nerve fiber layer, better scan obtained today    Intraocular pressure acceptable for now  Goal teens both  eyes   Continue brimonidine and Cosopt as before    Patient disposition:   Return in about 3 months (around 10/19/2023) for VT, OCT NFL with BMO, LVC.        Maribel Fermin MD  Resident Physician, PGY-3  Department of Ophthalmology      Attending Physician Attestation:  Complete documentation of historical and exam elements from today's encounter can be found in the full encounter summary report (not reduplicated in this progress note).  I personally obtained the chief complaint(s) and history of present illness.  I confirmed and edited as necessary the review of systems, past medical/surgical history, family history, social history, and examination findings as documented by others; and I examined the patient myself.  I personally reviewed the relevant tests, images, and reports as documented above.  I formulated and edited as necessary the assessment and plan and discussed the findings and management plan with the patient and family. .Attending Physician Image/Tesing Attestation: I personally reviewed the ophthalmic test(s) associated with this encounter, agree with the interpretation(s) as documented by the resident/fellow, and have edited the corresponding report(s) as necessary.   - Lul Mirza MD

## 2023-07-20 ENCOUNTER — TRANSFERRED RECORDS (OUTPATIENT)
Dept: HEALTH INFORMATION MANAGEMENT | Facility: CLINIC | Age: 68
End: 2023-07-20
Payer: MEDICARE

## 2023-07-31 ENCOUNTER — VIRTUAL VISIT (OUTPATIENT)
Dept: PSYCHIATRY | Facility: CLINIC | Age: 68
End: 2023-07-31
Payer: MEDICARE

## 2023-07-31 DIAGNOSIS — F99 INSOMNIA DUE TO OTHER MENTAL DISORDER: ICD-10-CM

## 2023-07-31 DIAGNOSIS — F51.05 INSOMNIA DUE TO OTHER MENTAL DISORDER: ICD-10-CM

## 2023-07-31 DIAGNOSIS — F31.31 BIPOLAR AFFECTIVE DISORDER, CURRENTLY DEPRESSED, MILD (H): Primary | ICD-10-CM

## 2023-07-31 DIAGNOSIS — F41.1 GAD (GENERALIZED ANXIETY DISORDER): ICD-10-CM

## 2023-07-31 PROCEDURE — 99214 OFFICE O/P EST MOD 30 MIN: CPT | Mod: VID | Performed by: NURSE PRACTITIONER

## 2023-07-31 RX ORDER — LAMOTRIGINE 150 MG/1
TABLET ORAL
Qty: 180 TABLET | Refills: 1 | Status: SHIPPED | OUTPATIENT
Start: 2023-07-31 | End: 2023-10-05

## 2023-07-31 RX ORDER — TRAZODONE HYDROCHLORIDE 100 MG/1
100 TABLET ORAL
Qty: 90 TABLET | Refills: 3 | Status: SHIPPED | OUTPATIENT
Start: 2023-07-31 | End: 2023-12-11

## 2023-07-31 RX ORDER — ARIPIPRAZOLE 5 MG/1
5 TABLET ORAL DAILY
Qty: 90 TABLET | Refills: 1 | Status: SHIPPED | OUTPATIENT
Start: 2023-07-31 | End: 2023-12-11

## 2023-07-31 ASSESSMENT — PAIN SCALES - GENERAL: PAINLEVEL: NO PAIN (0)

## 2023-07-31 ASSESSMENT — PATIENT HEALTH QUESTIONNAIRE - PHQ9
SUM OF ALL RESPONSES TO PHQ QUESTIONS 1-9: 1
SUM OF ALL RESPONSES TO PHQ QUESTIONS 1-9: 1

## 2023-07-31 NOTE — PATIENT INSTRUCTIONS
"Patient Education   The [facility]  What to Expect  Here's what to expect in the Panel Psychiatry Program.   About the program  You'll be meeting with a psychiatric doctor to check your mental health. A psychiatric doctor helps you deal with troubling thoughts and feelings by giving you medicine. They'll make sure you know the plan for your care. You may see them for a long time. When you're feeling better, they may refer you back to seeing your family doctor.   If you have any questions, we'll be glad to talk to you.  About visits  Be open  At your visits, please talk openly about your problems. It may feel hard, but it's the best way for us to help you.  Cancelling visits  If you can't come to your visit, please call us right away at [phone]. If you don't cancel at least 24 hours (1 full day) before your visit, that's \"late cancellation.\"  Not showing up for your visits  Being very late is the same as not showing up. You'll be a \"no show\" if:  You're more than 15 minutes late for a 30-minute (half hour) visit.  You're more than 30 minutes late for a 60-minute (full hour) visit.  If you cancel late or don't show up 2 times within 6 months, we may end your care.  Getting help between visits  If you need help between visits, you can call us Monday to Friday from 8 a.m. to 4:30 p.m. at [phone].  Emergency care  Call 911 or go to the nearest emergency department if your life or someone else's life is in danger.  Call 988 anytime to reach the national Suicide and Crisis hotline.  Medicine refills  To refill your medicine, call your pharmacy. You can also call [facility] at [phone], Monday to Friday, 8 a.m. to 4:30 p.m. It can take 1 to 3 business days to get a refill.   Forms, letters, and tests  You may have papers to fill out, like FMLA, short-term disability, and workability. We can help you with these forms at your visits, but you must have an " appointment. You may need more than 1 visit for this, to be in an intensive therapy program, or both.  Before we can give you medicine for ADHD, we may refer you to get tested for it or confirm it another way.  We may not be able to give you an emotional support animal letter.  We don't do mental health checks ordered by the court.   We don't do mental health testing, but we can refer you to get tested.   Thank you for choosing us for your care.  For informational purposes only. Not to replace the advice of your health care provider. Copyright   2022 [facility]. All rights reserved. [employer] 086732 - 12/22.         Treatment Plan:      1.  Abilify 5 mg daily  2.  Lamotrigine 300 mg daily  3.  Trazodone 100 mg at bedtime as needed for sleep  4.  Therapy will be discontinued at this time      Continue all other medical directions per primary care provider.   Continue all other medications as reviewed per electronic medical record today.   Safety plan reviewed. To the Emergency Department as needed or call after hours crisis line at [phone] or [phone]. [place] Crisis Text Line: Text [place] to 892726  or  Suicide LifeLine Chat: suicidepreventionBeneChillline.org/chat/  To schedule individual or family therapy, call [facility] at [phone].   Schedule an appointment with me in 3 months or sooner as needed.  Call [facility] at [phone] to schedule.  Follow up with primary care provider as planned or for acute medical concerns.  Call the psychiatric nurse line with medication questions or concerns at [phone].  UpOutt may be used to communicate with your provider, but this is not intended to be used for emergencies.    Crisis Resources:    National Suicide Prevention Lifeline: 174.750.1241 (TTY: 135.721.7985). Call anytime for help.  (www.suicidepreventionlifeline.org)  National [place] on Mental Illness (www.rey.org): 969.685.2091 or 815-248-5296.   Mental  Health Association (www.mentalhealth.org): 533-350-9162 or 222-483-4771.  [place] Crisis [place]: Text [place] to 271521  Suicide LifeLine Chat: suicidepreventionlifeline.org/chat

## 2023-07-31 NOTE — PROGRESS NOTES
"Virtual Visit Details    Type of service:  Video Visit   Video Start Time: 10:11 AM  Video End Time: 10:45 AM    Originating Location (pt. Location): Home    Distant Location (provider location):  On-site  Platform used for Video Visit: [facility] Psychiatric Progress Note    Name: Emperatriz Esteban   : 1955                    Primary Care Provider: Nayana Wheat MD   Therapist: Plans to stop    PHQ-9 scores:      2023     1:08 PM 2023    10:29 AM 2023    12:24 PM   PHQ-9 SCORE   PHQ-9 Total Score MyChart 1 (Minimal depression) 1 (Minimal depression) 1 (Minimal depression)   PHQ-9 Total Score 1 1 1       VAL-7 scores:      1/10/2023     4:37 PM 2023     1:46 PM   VAL-7 SCORE   Total Score  7 (mild anxiety)   Total Score 9 7       Patient Identification:    Patient is a 68 year old year old,   White Not  or  female  who presents for return visit with me.  Patient is currently unemployed. Patient attended the session alone. Patient prefers to be called: \"Eulalia\".    Current medications include: ARIPiprazole (ABILIFY) 5 MG tablet, Take 1 tablet (5 mg) by mouth daily  aspirin 81 MG EC tablet, Take 81 mg by mouth daily   atorvastatin (LIPITOR) 20 MG tablet, TAKE 1 TABLET BY MOUTH EVERY DAY  brimonidine (ALPHAGAN) 0.2 % ophthalmic solution, Place 1 drop into both eyes 2 times daily  dorzolamide-timolol (COSOPT) 2-0.5 % ophthalmic solution, Place 1 drop into both eyes 2 times daily  hypromellose (GENTEAL SEVERE) ophthalmic gel 0.3%, USE once at night in the eye. Both eyes  lamoTRIgine (LAMICTAL) 150 MG tablet, TAKE TWO TABLETS BY MOUTH DAILY.  levothyroxine (SYNTHROID, LEVOTHROID) 25 MCG tablet, Take 1 tablet (25 mcg) by mouth daily  montelukast (SINGULAIR) 10 MG tablet, Take 1 tablet (10 mg) by mouth At Bedtime  Multiple Minerals-Vitamins (ADVANCED CALCIUM/D/MAGNESIUM) TABS, Take 1,500 mg by mouth daily   multivitamin (OCUVITE) TABS, Take 1 tablet by " mouth daily   omega 3 1000 MG CAPS, Take 3 g by mouth daily   traZODone (DESYREL) 100 MG tablet, Take 1 tablet (100 mg) by mouth nightly as needed for sleep    No current facility-administered medications on file prior to visit.       The [place] Prescription Monitoring Program has been reviewed and there are no concerns about diversionary activity for controlled substances at this time.      I was able to review most recent Primary Care Provider, specialty provider, and therapy visit notes that I have access to.     Today, patient reports that she feels like the medications are working and she is having no side effects.  She is concerned about experiencing ataxia with dizziness and balance difficulties.  She is now being evaluated through a specialty clinic.  Multiple tests and assessments are in place at this time to determine the cause of this.  She tells me she has not been falling.  While she tells me she feels unsteady on her feet, she is not using any assistive devices.  Physical health steady.  No severe  mood swings.   Sometimes she gets angry -  -  caring for family member -a care conference on Thursday is scheduled to determine if her cousin will be transferred to a memory care facility from her assisted living apartment.  Eulalia has people available to move her into memory care.    She  got a therapist - she does not like  her - she was yawning when she was being talked to.  She wonders  if she needs a therapist. She does not feel unsupported by her family and friends.    She is active and has lots of friends and support.    She does not isolate.  No  SI.  She plans on meeting with her wife that she has not seen in 3 and a half years - may be considering a breakup.       has a past medical history of Bipolar 1 disorder (H).    Social history updates:    Eulalia lives alone.  She identifies several family members and friends in her apartment complex for support    Substance use updates:    No alcohol use  reported  Tobacco use: No    Vital Signs:   There were no vitals taken for this visit.    Labs:    Most recent laboratory results reviewed and no new labs.     Mental Status Examination:  Appearance: awake, alert and casual dress  Attitude: cooperative  Eye Contact:  adequate  Gait and Station: No assistive Devices used and Dizziness  Psychomotor Behavior:  no evidence of tardive dyskinesia, dystonia, or tics  Oriented to:  time, person, and place  Attention Span and Concentration:  Normal  Speech:   vtspeech: clear, coherent and Speaks: English  Mood:  anxious and depressed  Affect:  mood congruent  Associations:  no loose associations  Thought Process:  goal oriented  Thought Content:  no evidence of suicidal ideation or homicidal ideation, no auditory hallucinations present, and no visual hallucinations present  Recent and Remote Memory:  intact Not formally assessed. No amnesia.  Fund of Knowledge: appropriate  Insight:  good  Judgment: good  Impulse Control:  good    Suicide Risk Assessment:  Today Emperatriz Esteban reports no thoughts to harm themself or others. In addition, there are notable risk factors for self-harm, including anxiety and mood change. However, risk is mitigated by commitment to family, history of seeking help when needed, future oriented, denies suicidal intent or plan, and denies homicidal ideation, intent, or plan. Therefore, based on all available evidence including the factors cited above, Emperatriz Esteban does not appear to be at imminent risk for self-harm, does not meet criteria for a 72-hr hold, and therefore remains appropriate for ongoing outpatient level of care.  A thorough assessment of risk factors related to suicide and self-harm have been reviewed and are noted above. The patient convincingly denies suicidality on several occasions. Local community safety resources printed and reviewed for patient to use if needed. There was no deceit detected, and the patient presented in a  manner that was believable.     DSM5 Diagnosis:  296.89 Bipolar II Disorder With mixed features and mild  300.02 (F41.1) Generalized Anxiety Disorder  780.52 (G47.00) Insomnia Disorder   With non-sleep disorder mental comorbidity  Episodic      Medical comorbidities include:   Patient Active Problem List    Diagnosis Date Noted    Bilateral low back pain without sciatica 07/11/2023     Priority: Medium    Other specified glaucoma 05/26/2023     Priority: Medium    Impaired fasting glucose 12/16/2021     Priority: Medium    Prediabetes 09/02/2016     Priority: Medium    Bipolar affective disorder (H) 09/01/2016     Priority: Medium     Follows with psychiatry in [place], [place] (in [place] for a few months)          Other specified hypothyroidism 09/01/2016     Priority: Medium       Assessment:    Emperatriz Esteban reported in today that her mood symptoms are mostly stable.  Her main concern is her unsteadiness while on her feet and she is having this thoroughly evaluated over the next several weeks.  She has ongoing anxiety as she cares for a family member who is being assessed to live in memory care.  She relies on family and several friends in her apartment complex for support.  Her with regards to her personal relationship, she plans to meet with her partner within the next few weeks and anticipates a possible break-up in their relationship as it is a long distance and she has not seen her partner for 3-1/2 years.  No changes in her medications are requested today.  Eulalia has been through several therapists with limited success in treating her symptoms.  We decided to have her stop therapy at this time as she has identified support in her personal world that is satisfactory to meeting her needs..    Medication side effects and alternatives were reviewed. Health promotion activities recommended and reviewed today. All questions addressed. Education and counseling completed regarding risks and benefits of medications and  psychotherapy options.    Treatment Plan:      1.  Abilify 5 mg daily  2.  Lamotrigine 300 mg daily  3.  Trazodone 100 mg at bedtime as needed for sleep  4.  Therapy will be discontinued at this time    Continue all other medications as reviewed per electronic medical record today.   Safety plan reviewed. To the Emergency Department as needed or call after hours crisis line at [phone] or [phone]. [place] Crisis Text Line. Text [place] to 060977 or Suicide LifeLine Chat: suicideAdvise Only.org/chat/  To schedule individual or family therapy, call [facility] at [phone]  Schedule an appointment with me in 3 months or sooner as needed. Call [facility] at [phone] to schedule.  Follow up with primary care provider as planned or for acute medical concerns.  Call the psychiatric nurse line with medication questions or concerns at [phone]  MyChart may be used to communicate with your provider, but this is not intended to be used for emergencies.    Crisis Resources:    National Suicide Prevention Lifeline: 200.851.9840 (TTY: 525.260.1727). Call anytime for help.  (www.suicidepreventionlifeline.org)  National [place] on Mental Illness (www.rey.org): 685.315.1927 or 196-967-5715.   Mental Health Association (www.mentalhealth.org): 358.843.2372 or 140-665-4173.  [place] Crisis Text Line: Text [place] to 809441  Suicide LifeLine Chat: suicideAdvise Only.org/chat    Administrative Billing:   Time spent with patient includes counseling and coordination of care regarding above diagnoses and treatment plan.    Patient Status:  This is a continuous care patient and medications will be prescribed by the psychiatric provider until further indicated.    Signed:   GLORIA Mims-BC   Psychiatry

## 2023-07-31 NOTE — NURSING NOTE
Is the patient currently in the state of [place]? YES    Visit mode:VIDEO    If the visit is dropped, the patient can be reconnected by: VIDEO VISIT:  Send e-mail to at [email or web address]    Will anyone else be joining the visit? No  (If patient encounters technical issues they should call [phone])    How would you like to obtain your AVS? MyChart    Are changes needed to the allergy or medication list? No    Rooming Documentation: Assigned questionnaire(s) completed .    Reason for visit: RECHECK     KATHY Nunn

## 2023-08-15 ENCOUNTER — THERAPY VISIT (OUTPATIENT)
Dept: PHYSICAL THERAPY | Facility: CLINIC | Age: 68
End: 2023-08-15
Payer: MEDICARE

## 2023-08-15 DIAGNOSIS — G89.29 CHRONIC BILATERAL LOW BACK PAIN WITHOUT SCIATICA: Primary | ICD-10-CM

## 2023-08-15 DIAGNOSIS — M54.50 CHRONIC BILATERAL LOW BACK PAIN WITHOUT SCIATICA: Primary | ICD-10-CM

## 2023-08-15 PROCEDURE — 97110 THERAPEUTIC EXERCISES: CPT | Mod: GP | Performed by: PHYSICAL THERAPIST

## 2023-08-15 PROCEDURE — 97530 THERAPEUTIC ACTIVITIES: CPT | Mod: GP | Performed by: PHYSICAL THERAPIST

## 2023-08-15 PROCEDURE — 97112 NEUROMUSCULAR REEDUCATION: CPT | Mod: GP | Performed by: PHYSICAL THERAPIST

## 2023-08-23 ENCOUNTER — TRANSFERRED RECORDS (OUTPATIENT)
Dept: HEALTH INFORMATION MANAGEMENT | Facility: CLINIC | Age: 68
End: 2023-08-23
Payer: MEDICARE

## 2023-08-23 DIAGNOSIS — R73.03 PREDIABETES: ICD-10-CM

## 2023-08-23 RX ORDER — ATORVASTATIN CALCIUM 20 MG/1
TABLET, FILM COATED ORAL
Qty: 90 TABLET | Refills: 2 | Status: SHIPPED | OUTPATIENT
Start: 2023-08-23 | End: 2024-01-19

## 2023-08-23 NOTE — TELEPHONE ENCOUNTER
---Prescription approved per [facility].       Ilana James RN BSN     [facility]

## 2023-10-05 ENCOUNTER — VIRTUAL VISIT (OUTPATIENT)
Dept: PSYCHIATRY | Facility: CLINIC | Age: 68
End: 2023-10-05
Payer: MEDICARE

## 2023-10-05 DIAGNOSIS — F51.05 INSOMNIA DUE TO OTHER MENTAL DISORDER: ICD-10-CM

## 2023-10-05 DIAGNOSIS — F31.31 BIPOLAR AFFECTIVE DISORDER, CURRENTLY DEPRESSED, MILD (H): Primary | ICD-10-CM

## 2023-10-05 DIAGNOSIS — E03.8 OTHER SPECIFIED HYPOTHYROIDISM: ICD-10-CM

## 2023-10-05 DIAGNOSIS — F41.1 GAD (GENERALIZED ANXIETY DISORDER): ICD-10-CM

## 2023-10-05 DIAGNOSIS — F99 INSOMNIA DUE TO OTHER MENTAL DISORDER: ICD-10-CM

## 2023-10-05 PROCEDURE — 99214 OFFICE O/P EST MOD 30 MIN: CPT | Mod: VID | Performed by: NURSE PRACTITIONER

## 2023-10-05 RX ORDER — LEVOTHYROXINE SODIUM 25 UG/1
25 TABLET ORAL DAILY
Qty: 90 TABLET | Refills: 0 | Status: SHIPPED | OUTPATIENT
Start: 2023-10-05 | End: 2023-12-12

## 2023-10-05 RX ORDER — LAMOTRIGINE 150 MG/1
TABLET ORAL
Qty: 180 TABLET | Refills: 1 | Status: SHIPPED | OUTPATIENT
Start: 2023-10-05 | End: 2023-12-11

## 2023-10-05 ASSESSMENT — PATIENT HEALTH QUESTIONNAIRE - PHQ9
SUM OF ALL RESPONSES TO PHQ QUESTIONS 1-9: 2
SUM OF ALL RESPONSES TO PHQ QUESTIONS 1-9: 2

## 2023-10-05 NOTE — PATIENT INSTRUCTIONS
"Patient Education   The Panel Psychiatry Program  What to Expect  Here's what to expect in the Panel Psychiatry Program.   About the program  You'll be meeting with a psychiatric doctor to check your mental health. A psychiatric doctor helps you deal with troubling thoughts and feelings by giving you medicine. They'll make sure you know the plan for your care. You may see them for a long time. When you're feeling better, they may refer you back to seeing your family doctor.   If you have any questions, we'll be glad to talk to you.  About visits  Be open  At your visits, please talk openly about your problems. It may feel hard, but it's the best way for us to help you.  Cancelling visits  If you can't come to your visit, please call us right away at [phone]. If you don't cancel at least 24 hours (1 full day) before your visit, that's \"late cancellation.\"  Not showing up for your visits  Being very late is the same as not showing up. You'll be a \"no show\" if:  You're more than 15 minutes late for a 30-minute (half hour) visit.  You're more than 30 minutes late for a 60-minute (full hour) visit.  If you cancel late or don't show up 2 times within 6 months, we may end your care.  Getting help between visits  If you need help between visits, you can call us Monday to Friday from 8 a.m. to 4:30 p.m. at [phone].  Emergency care  Call 911 or go to the nearest emergency department if your life or someone else's life is in danger.  Call 988 anytime to reach the national Suicide and Crisis hotline.  Medicine refills  To refill your medicine, call your pharmacy. You can also call [facility]'s [facility] at [phone], Monday to Friday, 8 a.m. to 4:30 p.m. It can take 1 to 3 business days to get a refill.   Forms, letters, and tests  You may have papers to fill out, like FMLA, short-term disability, and workability. We can help you with these forms at your visits, but you must have an " appointment. You may need more than 1 visit for this, to be in an intensive therapy program, or both.  Before we can give you medicine for ADHD, we may refer you to get tested for it or confirm it another way.  We may not be able to give you an emotional support animal letter.  We don't do mental health checks ordered by the court.   We don't do mental health testing, but we can refer you to get tested.   Thank you for choosing us for your care.  For informational purposes only. Not to replace the advice of your health care provider. Copyright   2022 [facility]. All rights reserved. Filao [id number] - 12/22.         Treatment Plan:      1.  Abilify 5 mg daily  2.  Lamotrigine 300 mg daily  3.  Levothyroxine 25 mcg daily  4.  Trazodone 100 mg daily  5.  I will contact you about the possibility of beginning talk therapy once I get resources available for you.    Continue all other medications as reviewed per electronic medical record today.   Safety plan reviewed. To the Emergency Department as needed or call after hours crisis line at [phone] or [phone]. [place] Crisis Text Line. Text [place] to 267008 or Suicide LifeLine Chat: suicidepreventionStronghold Technologyline.org/chat/  To schedule individual or family therapy, call [facility] at [phone]  Schedule an appointment with me in December or sooner as needed. Call [facility] at [phone] to schedule.  Follow up with primary care provider as planned or for acute medical concerns.  Call the psychiatric nurse line with medication questions or concerns at [phone]  MyChart may be used to communicate with your provider, but this is not intended to be used for emergencies.    Crisis Resources:    National Suicide Prevention Lifeline: 188.734.6831 (TTY: 931.432.1507). Call anytime for help.  (www.suicidepreventionlifeline.org)  National [place] on Mental Illness (www.rey.org): 966.292.9291 or 858-156-8989.   Mental  Health Association (www.mentalhealth.org): 427-117-4199 or 684-553-7162.  [place] Crisis Text Line: Text [place] to 851854  Suicide LifeLine Chat: suicidepreventionlifeline.org/chat

## 2023-10-05 NOTE — PROGRESS NOTES
"Virtual Visit Details    Type of service:  Video Visit   Video Start Time: 10:48 AM  Video End Time: 11:17 AM    Originating Location (pt. Location): Home    Distant Location (provider location):  On-site  Platform used for Video Visit: [facility] Psychiatric Progress Note    Name: Emperatriz Esteban   : 1955                    Primary Care Provider: Nayana Wheat MD   Therapist: None    PHQ-9 scores:      2023    10:29 AM 2023    12:24 PM 2023     8:04 AM   PHQ-9 SCORE   PHQ-9 Total Score MyChart 1 (Minimal depression) 1 (Minimal depression) 1 (Minimal depression)   PHQ-9 Total Score 1 1 1    1       VAL-7 scores:      1/10/2023     4:37 PM 2023     1:46 PM   VAL-7 SCORE   Total Score  7 (mild anxiety)   Total Score 9 7       Patient Identification:    Patient is a 68 year old year old, partnered / significant other  White Not  or  female  who presents for return visit with me.  Patient is currently unemployed. Patient attended the session alone. Patient prefers to be called: \"Eulalia\".    Current medications include: ARIPiprazole (ABILIFY) 5 MG tablet, Take 1 tablet (5 mg) by mouth daily  aspirin 81 MG EC tablet, Take 81 mg by mouth daily   atorvastatin (LIPITOR) 20 MG tablet, TAKE 1 TABLET BY MOUTH EVERY DAY  brimonidine (ALPHAGAN) 0.2 % ophthalmic solution, Place 1 drop into both eyes 2 times daily  dorzolamide-timolol (COSOPT) 2-0.5 % ophthalmic solution, Place 1 drop into both eyes 2 times daily  hypromellose (GENTEAL SEVERE) ophthalmic gel 0.3%, USE once at night in the eye. Both eyes  lamoTRIgine (LAMICTAL) 150 MG tablet, TAKE TWO TABLETS BY MOUTH DAILY.  levothyroxine (SYNTHROID, LEVOTHROID) 25 MCG tablet, Take 1 tablet (25 mcg) by mouth daily  montelukast (SINGULAIR) 10 MG tablet, Take 1 tablet (10 mg) by mouth At Bedtime  Multiple Minerals-Vitamins (ADVANCED CALCIUM/D/MAGNESIUM) TABS, Take 1,500 mg by mouth daily   multivitamin (OCUVITE) TABS, " Take 1 tablet by mouth daily   omega 3 1000 MG CAPS, Take 3 g by mouth daily   traZODone (DESYREL) 100 MG tablet, Take 1 tablet (100 mg) by mouth nightly as needed for sleep    No current facility-administered medications on file prior to visit.       The [place] Prescription Monitoring Program has been reviewed and there are no concerns about diversionary activity for controlled substances at this time.      I was able to review most recent Primary Care Provider, specialty provider, and therapy visit notes that I have access to.     Today, patient reports that she has been keeping busy.  Her 98-year-old cousin was placed in memory care and it has been difficult to manage her behavioral changes.  Her cousin is having distress over being locked up there.  Eulalia is planning a trip in January to see her partner in [place].  She is concerned about the trip because her balance is not good.  Vestibular and vision impairment have made it difficult for her to ambulate at times.. Going to [facility] and [facility] center. The exercises she needs to do make her dizzy.  No spontaneous emotional outbursts.  Sometimes she overreacts to her cousin and her partner.  She is still interested in seeking out counseling support.       has a past medical history of Bipolar 1 disorder (H).    Social history updates:    Eulalia lives alone with her dog.  She is unemployed.    Substance use updates:    No alcohol use reported  Tobacco use: No    Vital Signs:   There were no vitals taken for this visit.    Labs:    Most recent laboratory results reviewed and no new labs.     Mental Status Examination:  Appearance: awake, alert and casual dress  Attitude: cooperative  Eye Contact:  adequate  Gait and Station: Dizziness  Psychomotor Behavior:  intact station, gait and muscle tone  Oriented to:  time, person, and place  Attention Span and Concentration:  Normal  Speech:   vtspeech: clear, coherent and Speaks: English  Mood:  anxious and  depressed  Affect:  mood congruent  Associations:  no loose associations  Thought Process:  goal oriented  Thought Content:  no evidence of suicidal ideation or homicidal ideation, no auditory hallucinations present, and no visual hallucinations present  Recent and Remote Memory:  intact Not formally assessed. No amnesia.  Fund of Knowledge: appropriate  Insight:  good  Judgment: good  Impulse Control:  good    Suicide Risk Assessment:  Today Emperatriz Esteban reports no thoughts to harm themself or others. In addition, there are notable risk factors for self-harm, including age, anxiety, and mood change. However, risk is mitigated by commitment to family, history of seeking help when needed, future oriented, denies suicidal intent or plan, and denies homicidal ideation, intent, or plan. Therefore, based on all available evidence including the factors cited above, Emperatriz Esteban does not appear to be at imminent risk for self-harm, does not meet criteria for a 72-hr hold, and therefore remains appropriate for ongoing outpatient level of care.  A thorough assessment of risk factors related to suicide and self-harm have been reviewed and are noted above. The patient convincingly denies suicidality on several occasions. Local community safety resources printed and reviewed for patient to use if needed. There was no deceit detected, and the patient presented in a manner that was believable.     DSM5 Diagnosis:  296.89 Bipolar II Disorder Depressed and moderate  300.02 (F41.1) Generalized Anxiety Disorder  780.52 (G47.00) Insomnia Disorder   With non-sleep disorder mental comorbidity  Episodic      Medical comorbidities include:   Patient Active Problem List    Diagnosis Date Noted    Bilateral low back pain without sciatica 07/11/2023     Priority: Medium    Other specified glaucoma 05/26/2023     Priority: Medium    Impaired fasting glucose 12/16/2021     Priority: Medium    Prediabetes 09/02/2016     Priority: Medium     Bipolar affective disorder (H) 09/01/2016     Priority: Medium     Follows with psychiatry in [place], [place] (in [place] for a few months)          Other specified hypothyroidism 09/01/2016     Priority: Medium       Assessment:    Emperatriz Esteban is busy managing caring for family member who has dementia.  This has kept her busy.  She also is planning a trip to see her partner in [place] and the relationship is stressful also to her.  It has been difficult for her to ambulate as she has vertigo from vision impairment as well as vestibular changes.  She is being followed by the balance clinic.  She is requesting a refill also of her levothyroxine and given its low dose as well as her depression symptoms, I ordered her 25 mcg daily.  Her last TSH level was within normal limits.  At this point she will continue with Lamictal and Abilify for mood stabilization..  Trazodone is taken at bedtime to help her rest through the night.  Racing thoughts can keep her awake occasionally.  I am also reaching out to a therapist to inquire about her availability and will reach out to Eulalia with the outcome.    Medication side effects and alternatives were reviewed. Health promotion activities recommended and reviewed today. All questions addressed. Education and counseling completed regarding risks and benefits of medications and psychotherapy options.    Treatment Plan:      1.  Abilify 5 mg daily  2.  Lamotrigine 300 mg daily  3.  Levothyroxine 25 mcg daily  4.  Trazodone 100 mg daily  5.  I will contact you about the possibility of beginning talk therapy once I get resources available for you.    Continue all other medications as reviewed per electronic medical record today.   Safety plan reviewed. To the Emergency Department as needed or call after hours crisis line at [phone] or [phone]. [place] Crisis Text Line. Text [place] to 632033 or Suicide LifeLine Chat: suicidepreventionlifeline.org/chat/  To schedule individual or  family therapy, call [facility] at [phone]  Schedule an appointment with me in December or sooner as needed. Call [facility] at [phone] to schedule.  Follow up with primary care provider as planned or for acute medical concerns.  Call the psychiatric nurse line with medication questions or concerns at [phone]  MyChart may be used to communicate with your provider, but this is not intended to be used for emergencies.    Crisis Resources:    National Suicide Prevention Lifeline: 958.598.5535 (TTY: 115.857.1633). Call anytime for help.  (www.suicidepreventionlifeline.org)  National [place] on Mental Illness (www.rey.org): 493.291.6882 or 704-844-4143.   Mental Health Association (www.mentalhealth.org): 603.772.6830 or 687-915-1313.  [place] Crisis Text Line: Text [place] to 064217  Suicide LifeLine Chat: suicidepreSpace Exploration Technologieslifeline.org/chat    Administrative Billing:   Time spent with patient includes counseling and coordination of care regarding above diagnoses and treatment plan.    Patient Status:  This is a continuous care patient and medications will be prescribed by the psychiatric provider until further indicated.    Signed:   GLORIA Mims-BC   Psychiatry

## 2023-10-05 NOTE — NURSING NOTE
Is the patient currently in the state of [place]? YES    Visit mode:VIDEO    If the visit is dropped, the patient can be reconnected by: VIDEO VISIT: Text to cell phone:   Telephone Information:   Mobile [phone]       Will anyone else be joining the visit? No  (If patient encounters technical issues they should call [phone])    How would you like to obtain your AVS? MyChart    Are changes needed to the allergy or medication list? No    Rooming Documentation: Patient will complete qnrs in mychart.    Reason for visit: KATHY Mcdermott        SUBJECTIVE:   CC: Edenilson Mccartney is an 37 year old male who presents for preventative health visit.   Patient has been advised of split billing requirements and indicates understanding: Yes  Healthy Habits:     Getting at least 3 servings of Calcium per day:  NO    Bi-annual eye exam:  Yes    Dental care twice a year:  NO    Sleep apnea or symptoms of sleep apnea:  None    Diet:  Diabetic    Frequency of exercise:  4-5 days/week    Duration of exercise:  45-60 minutes    Taking medications regularly:  Yes    Medication side effects:  None    PHQ-2 Total Score: 0    Additional concerns today:  No          -------------------------------------    Today's PHQ-2 Score:   PHQ-2 ( 1999 [employer]) 6/16/2022   Q1: Little interest or pleasure in doing things 0   Q2: Feeling down, depressed or hopeless 0   PHQ-2 Score 0   PHQ-2 Total Score (12-17 Years)- Positive if 3 or more points; Administer PHQ-A if positive -   Q1: Little interest or pleasure in doing things Not at all   Q2: Feeling down, depressed or hopeless Not at all   PHQ-2 Score 0       Abuse: Current or Past(Physical, Sexual or Emotional)- No  Do you feel safe in your environment? Yes    Have you ever done Advance Care Planning? (For example, a Health Directive, POLST, or a discussion with a medical provider or your loved ones about your wishes): No, advance care planning information given to patient to review.  Patient declined advance care planning discussion at this time.    Social History     Tobacco Use     Smoking status: Never Smoker     Smokeless tobacco: Current User     Types: Chew   Substance Use Topics     Alcohol use: Yes     Alcohol/week: 0.0 standard drinks     Comment: occasional         Alcohol Use 6/16/2022   Prescreen: >3 drinks/day or >7 drinks/week? No       Last PSA: No results found for: PSA    Reviewed orders with patient. Reviewed health maintenance and updated orders accordingly - Yes  Labs reviewed in EPIC  BP Readings from Last 3  Encounters:   06/16/22 (!) 146/94   02/09/21 136/84   01/21/20 134/88    Wt Readings from Last 3 Encounters:   06/16/22 (!) 164.7 kg (363 lb)   02/09/21 (!) 172.8 kg (381 lb)   01/21/20 (!) 161.5 kg (356 lb 1.6 oz)                  Patient Active Problem List   Diagnosis     Proteinuria     Hyperlipidemia LDL goal <100     HTN, goal below 140/90     Microalbuminuria     Type 2 diabetes mellitus with complication, without long-term current use of insulin (H)     Spleen hematoma, initial encounter     Varicose vein of leg     Spleen hematoma, subsequent encounter     Obesity     Morbid obesity (H)     History reviewed. No pertinent surgical history.    Social History     Tobacco Use     Smoking status: Never Smoker     Smokeless tobacco: Current User     Types: Chew   Substance Use Topics     Alcohol use: Yes     Alcohol/week: 0.0 standard drinks     Comment: occasional     History reviewed. No pertinent family history.      Current Outpatient Medications   Medication Sig Dispense Refill     ASPIRIN NOT PRESCRIBED, INTENTIONAL, Antiplatelet medication not prescribed intentionally due to Not indicated based on age       blood glucose (ACCU-CHEK SMARTVIEW) test strip Use to test blood sugar one time daily or as directed. 100 strip prn     blood glucose monitoring (ACCU-CHEK FASTCLIX) lancets Use to test blood sugar one time daily or as directed. 100 each 0     losartan (COZAAR) 25 MG tablet TAKE 1 TABLET (25 MG) BY MOUTH DAILY 90 tablet 1     metFORMIN (GLUCOPHAGE) 500 MG tablet TAKE 1 TABLET (500 MG) BY MOUTH 2 TIMES DAILY WITH MEALS 180 tablet 1     glimepiride (AMARYL) 2 MG tablet TAKE ONE TABLET BY MOUTH EVERY MORNING BEFORE BREAKFAST (NEED OFFICE VISIT FOR ANY FURTHER REFILLS) (Patient not taking: Reported on 6/16/2022) 15 tablet 0     order for DME 15-20 mm mercury thigh high compression stockings 1-2 pairs (Patient not taking: Reported on 6/16/2022) 2 Package 5     Allergies   Allergen Reactions     No Known Drug  Allergies      Recent Labs   Lab Test 06/16/22  1556 02/09/21  1100 01/21/20  1025 03/18/19  0914 10/27/17  1545 02/08/17  1130 03/25/15  0935 02/25/15  1342   A1C 10.8* 7.6* 10.6* 11.5*   < > 6.3*   < > 7.3*   * 123*  --  100*  --  111*   < >  --    HDL 45 43  --  35*  --  46   < >  --    TRIG 311* 271*  --  379*  --  159*   < >  --    ALT  --   --  74*  --   --  37  --  44   CR 1.00 1.02 0.98 1.03   < > 0.97  --  0.91   GFRESTIMATED >90 >90 >90 >90   < > 89  --  >90  Non  GFR Calc     GFRESTBLACK  --  >90 >90 >90   < > >90  African American GFR Calc    --  >90   GFR Calc     POTASSIUM 3.9 4.3 4.8  --    < > 4.2  --  4.0   TSH  --   --   --  3.04  --  3.99  --  3.22    < > = values in this interval not displayed.        Reviewed and updated as needed this visit by clinical staff   Tobacco  Allergies    Med Hx  Surg Hx  Fam Hx  Soc Hx          Reviewed and updated as needed this visit by Provider                   Past Medical History:   Diagnosis Date     Diabetes (H)      HTN, goal below 140/90 8/5/2015     Hyperlipidemia LDL goal <100 2/25/2015     Microalbuminuria 10/14/2015     Morbid obesity with BMI of 45.0-49.9, adult (H) 2/25/2015     Obesity (BMI 30-39.9) 8/5/2015     Other acne     Acne     Proteinuria 2/25/2015     Type 2 diabetes mellitus with complication, without long-term current use of insulin (H) 2/8/2017     Type 2 diabetes, HbA1c goal < 7% (H) 2/25/2015      History reviewed. No pertinent surgical history.    Review of Systems   Constitutional: Negative for chills and fever.   HENT: Negative for congestion, ear pain, hearing loss and sore throat.    Eyes: Negative for pain and visual disturbance.   Respiratory: Negative for cough and shortness of breath.    Cardiovascular: Negative for chest pain, palpitations and peripheral edema.   Gastrointestinal: Negative for abdominal pain, constipation, diarrhea, heartburn, hematochezia and nausea.  "  Genitourinary: Negative for dysuria, frequency, genital sores, hematuria, impotence, penile discharge and urgency.   Musculoskeletal: Negative for arthralgias, joint swelling and myalgias.   Skin: Negative for rash.   Neurological: Negative for dizziness, weakness, headaches and paresthesias.   Psychiatric/Behavioral: Negative for mood changes. The patient is not nervous/anxious.          OBJECTIVE:   BP (!) 146/94   Pulse 102   Temp 98.5  F (36.9  C) (Temporal)   Ht 1.88 m (6' 2\")   Wt (!) 164.7 kg (363 lb)   SpO2 96%   BMI 46.61 kg/m      Physical Exam  GENERAL: healthy, alert and no distress  EYES: Eyes grossly normal to inspection, PERRL and conjunctivae and sclerae normal  HENT: ear canals and TM's normal, nose and mouth without ulcers or lesions  NECK: no adenopathy, no asymmetry, masses, or scars and thyroid normal to palpation  RESP: lungs clear to auscultation - no rales, rhonchi or wheezes  CV: regular rate and rhythm, normal S1 S2, no S3 or S4, no murmur, click or rub, no peripheral edema and peripheral pulses strong  ABDOMEN: soft, nontender, no hepatosplenomegaly, no masses and bowel sounds normal  MS: no gross musculoskeletal defects noted, no edema  SKIN: no suspicious lesions or rashes  NEURO: Normal strength and tone, mentation intact and speech normal  PSYCH: mentation appears normal, affect normal/bright    Diagnostic Test Results:  Results for orders placed or performed in visit on 06/16/22   HIV Antigen Antibody Combo     Status: Normal   Result Value Ref Range    HIV Antigen Antibody Combo Nonreactive Nonreactive   Hepatitis C Screen Reflex to HCV RNA Quant and Genotype     Status: Normal   Result Value Ref Range    Hepatitis C Antibody Nonreactive Nonreactive    Narrative    Assay performance characteristics have not been established for newborns, infants, and children.   HEMOGLOBIN A1C     Status: Abnormal   Result Value Ref Range    Hemoglobin A1C 10.8 (H) 0.0 - 5.6 %    Narrative    " Results confirmed by repeat test.   BASIC METABOLIC PANEL     Status: Abnormal   Result Value Ref Range    Sodium 136 133 - 144 mmol/L    Potassium 3.9 3.4 - 5.3 mmol/L    Chloride 103 94 - 109 mmol/L    Carbon Dioxide (CO2) 30 20 - 32 mmol/L    Anion Gap 3 3 - 14 mmol/L    Urea Nitrogen 18 7 - 30 mg/dL    Creatinine 1.00 0.66 - 1.25 mg/dL    Calcium 9.3 8.5 - 10.1 mg/dL    Glucose 246 (H) 70 - 99 mg/dL    GFR Estimate >90 >60 mL/min/1.73m2   Lipid panel reflex to direct LDL Fasting     Status: Abnormal   Result Value Ref Range    Cholesterol 224 (H) <200 mg/dL    Triglycerides 311 (H) <150 mg/dL    Direct Measure HDL 45 >=40 mg/dL    LDL Cholesterol Calculated 117 (H) <=100 mg/dL    Non HDL Cholesterol 179 (H) <130 mg/dL    Patient Fasting > 8hrs? Yes     Narrative    Cholesterol  Desirable:  <200 mg/dL    Triglycerides  Normal:  Less than 150 mg/dL  Borderline High:  150-199 mg/dL  High:  200-499 mg/dL  Very High:  Greater than or equal to 500 mg/dL    Direct Measure HDL  Female:  Greater than or equal to 50 mg/dL   Male:  Greater than or equal to 40 mg/dL    LDL Cholesterol  Desirable:  <100mg/dL  Above Desirable:  100-129 mg/dL   Borderline High:  130-159 mg/dL   High:  160-189 mg/dL   Very High:  >= 190 mg/dL    Non HDL Cholesterol  Desirable:  130 mg/dL  Above Desirable:  130-159 mg/dL  Borderline High:  160-189 mg/dL  High:  190-219 mg/dL  Very High:  Greater than or equal to 220 mg/dL   Albumin Random Urine Quantitative with Creat Ratio     Status: Abnormal   Result Value Ref Range    Creatinine Urine mg/dL 157 mg/dL    Albumin Urine mg/L 1,600 mg/L    Albumin Urine mg/g Cr 1,019.11 (H) 0.00 - 17.00 mg/g Cr       ASSESSMENT/PLAN:   1. Type 2 diabetes mellitus with complication, without long-term current use of insulin (H)  - HEMOGLOBIN A1C; Future  - BASIC METABOLIC PANEL; Future  - Albumin Random Urine Quantitative with Creat Ratio; Future  - HEMOGLOBIN A1C  - BASIC METABOLIC PANEL  - Albumin Random Urine  "Quantitative with Creat Ratio    2. Screening for HIV (human immunodeficiency virus)  - HIV Antigen Antibody Combo; Future  - HIV Antigen Antibody Combo    3. Need for hepatitis C screening test  - Hepatitis C Screen Reflex to HCV RNA Quant and Genotype; Future  - Hepatitis C Screen Reflex to HCV RNA Quant and Genotype    4. Screening for hyperlipidemia  - Lipid panel reflex to direct LDL Fasting; Future  - Lipid panel reflex to direct LDL Fasting    Patient has been advised of split billing requirements and indicates understanding: Yes    COUNSELING:   Reviewed preventive health counseling, as reflected in patient instructions       Regular exercise       Healthy diet/nutrition       Vision screening       Hearing screening    Estimated body mass index is 46.61 kg/m  as calculated from the following:    Height as of this encounter: 1.88 m (6' 2\").    Weight as of this encounter: 164.7 kg (363 lb).     Weight management plan: Discussed healthy diet and exercise guidelines    He reports that he has never smoked. His smokeless tobacco use includes chew.  Tobacco Cessation Action Plan:   Information offered: Patient not interested at this time      Counseling Resources:  ATP IV Guidelines  Pooled Cohorts Equation Calculator  FRAX Risk Assessment  ICSI Preventive Guidelines  Dietary Guidelines for Americans, 2010  USDA's MyPlate  ASA Prophylaxis  Lung CA Screening    DO JOSE C Gil [facility]  "

## 2023-12-11 ENCOUNTER — VIRTUAL VISIT (OUTPATIENT)
Dept: PSYCHIATRY | Facility: CLINIC | Age: 68
End: 2023-12-11
Payer: MEDICARE

## 2023-12-11 DIAGNOSIS — F99 INSOMNIA DUE TO OTHER MENTAL DISORDER: ICD-10-CM

## 2023-12-11 DIAGNOSIS — F41.1 GAD (GENERALIZED ANXIETY DISORDER): ICD-10-CM

## 2023-12-11 DIAGNOSIS — F31.31 BIPOLAR AFFECTIVE DISORDER, CURRENTLY DEPRESSED, MILD (H): Primary | ICD-10-CM

## 2023-12-11 DIAGNOSIS — F51.05 INSOMNIA DUE TO OTHER MENTAL DISORDER: ICD-10-CM

## 2023-12-11 PROCEDURE — 99214 OFFICE O/P EST MOD 30 MIN: CPT | Mod: VID | Performed by: NURSE PRACTITIONER

## 2023-12-11 RX ORDER — LAMOTRIGINE 150 MG/1
TABLET ORAL
Qty: 180 TABLET | Refills: 1 | Status: SHIPPED | OUTPATIENT
Start: 2023-12-11 | End: 2024-03-04

## 2023-12-11 RX ORDER — ARIPIPRAZOLE 5 MG/1
5 TABLET ORAL DAILY
Qty: 90 TABLET | Refills: 1 | Status: SHIPPED | OUTPATIENT
Start: 2023-12-11 | End: 2024-03-04

## 2023-12-11 RX ORDER — TRAZODONE HYDROCHLORIDE 50 MG/1
50-100 TABLET, FILM COATED ORAL
Qty: 180 TABLET | Refills: 1 | Status: SHIPPED | OUTPATIENT
Start: 2023-12-11 | End: 2024-03-04

## 2023-12-11 ASSESSMENT — PATIENT HEALTH QUESTIONNAIRE - PHQ9
10. IF YOU CHECKED OFF ANY PROBLEMS, HOW DIFFICULT HAVE THESE PROBLEMS MADE IT FOR YOU TO DO YOUR WORK, TAKE CARE OF THINGS AT HOME, OR GET ALONG WITH OTHER PEOPLE: SOMEWHAT DIFFICULT
SUM OF ALL RESPONSES TO PHQ QUESTIONS 1-9: 1
SUM OF ALL RESPONSES TO PHQ QUESTIONS 1-9: 1

## 2023-12-11 NOTE — PROGRESS NOTES
"Virtual Visit Details    Type of service:  Video Visit   Video Start Time: 12:42 PM  Video End Time:1:08 PM    Originating Location (pt. Location): Home    Distant Location (provider location):  On-site  Platform used for Video Visit: [facility]               Outpatient Psychiatric Progress Note    Name: Emperatriz Esteban   : 1955                    Primary Care Provider: Nayana Wheat MD   Therapist: None    PHQ-9 scores:      2023    12:24 PM 2023     8:04 AM 10/5/2023    10:04 AM   PHQ-9 SCORE   PHQ-9 Total Score MyChart 1 (Minimal depression) 1 (Minimal depression) 2 (Minimal depression)   PHQ-9 Total Score 1 1    1 2       VAL-7 scores:      1/10/2023     4:37 PM 2023     1:46 PM   VAL-7 SCORE   Total Score  7 (mild anxiety)   Total Score 9 7       Patient Identification:    Patient is a 68 year old year old, single  White Not  or  female  who presents for return visit with me.  Patient is currently retired. Patient attended the session alone. Patient prefers to be called: \"Eulalia\".    Current medications include: ARIPiprazole (ABILIFY) 5 MG tablet, Take 1 tablet (5 mg) by mouth daily  aspirin 81 MG EC tablet, Take 81 mg by mouth daily   atorvastatin (LIPITOR) 20 MG tablet, TAKE 1 TABLET BY MOUTH EVERY DAY  brimonidine (ALPHAGAN) 0.2 % ophthalmic solution, Place 1 drop into both eyes 2 times daily  dorzolamide-timolol (COSOPT) 2-0.5 % ophthalmic solution, Place 1 drop into both eyes 2 times daily  hypromellose (GENTEAL SEVERE) ophthalmic gel 0.3%, USE once at night in the eye. Both eyes  lamoTRIgine (LAMICTAL) 150 MG tablet, TAKE TWO TABLETS BY MOUTH DAILY.  levothyroxine (SYNTHROID/LEVOTHROID) 25 MCG tablet, Take 1 tablet (25 mcg) by mouth daily  Multiple Minerals-Vitamins (ADVANCED CALCIUM/D/MAGNESIUM) TABS, Take 1,500 mg by mouth daily   multivitamin (OCUVITE) TABS, Take 1 tablet by mouth daily   omega 3 1000 MG CAPS, Take 3 g by mouth daily   traZODone (DESYREL) 100 MG " tablet, Take 1 tablet (100 mg) by mouth nightly as needed for sleep    No current facility-administered medications on file prior to visit.       The [place] Prescription Monitoring Program has been reviewed and there are no concerns about diversionary activity for controlled substances at this time.      I was able to review most recent Primary Care Provider, specialty provider, and therapy visit notes that I have access to.     Today, patient reports he has been socializing with friends and family.  He also has been watching movies ad reading.  Sleep and appetite are fine.She has  a hard time waking up in the  morning.  When spring comes she wants to go to a balance clinic close to her home.   She trips sometimes but does not fall.       has a past medical history of Bipolar 1 disorder (H).    Social history updates:    She continues to live in her own apartment.  Friends and family are available for support    Substance use updates:    No alcohol use reported  Tobacco use: No    Vital Signs:   There were no vitals taken for this visit.    Labs:    Most recent laboratory results reviewed and no new labs.     Mental Status Examination:  Appearance: awake, alert and casual dress  Attitude: cooperative  Eye Contact:  adequate  Gait and Station: Dizziness  Psychomotor Behavior:   Reports unsteady gait  Oriented to:  time, person, and place  Attention Span and Concentration:  Normal  Speech:   vtspeech: clear, coherent and Speaks: English  Mood:  anxious and depressed  Affect:  mood congruent  Associations:  no loose associations  Thought Process:  goal oriented  Thought Content:  no evidence of suicidal ideation or homicidal ideation, no auditory hallucinations present, and no visual hallucinations present  Recent and Remote Memory:  intact Not formally assessed. No amnesia.  Fund of Knowledge: appropriate  Insight:  good  Judgment: good  Impulse Control:  good    Suicide Risk Assessment:  Today Emperatriz Esteban  reports no thoughts to harm themself or others. In addition, there are notable risk factors for self-harm, including age, single status, and anxiety. However, risk is mitigated by commitment to family, history of seeking help when needed, future oriented, denies suicidal intent or plan, and denies homicidal ideation, intent, or plan. Therefore, based on all available evidence including the factors cited above, Emperatriz Esteban does not appear to be at imminent risk for self-harm, does not meet criteria for a 72-hr hold, and therefore remains appropriate for ongoing outpatient level of care.  A thorough assessment of risk factors related to suicide and self-harm have been reviewed and are noted above. The patient convincingly denies suicidality on several occasions. Local community safety resources printed and reviewed for patient to use if needed. There was no deceit detected, and the patient presented in a manner that was believable.     DSM5 Diagnosis:  296.89 Bipolar II Disorder Depressed and mild  300.02 (F41.1) Generalized Anxiety Disorder  780.52 (G47.00) Insomnia Disorder   With non-sleep disorder mental comorbidity  Recurrent      Medical comorbidities include:   Patient Active Problem List    Diagnosis Date Noted    Bilateral low back pain without sciatica 07/11/2023     Priority: Medium    Other specified glaucoma 05/26/2023     Priority: Medium    Impaired fasting glucose 12/16/2021     Priority: Medium    Prediabetes 09/02/2016     Priority: Medium    Bipolar affective disorder (H) 09/01/2016     Priority: Medium     Follows with psychiatry in [place], [place] (in [place] for a few months)        Other specified hypothyroidism 09/01/2016     Priority: Medium       Assessment:    Emperatriz Esteban has become social with friends and family more often.  She continues to be concerned about her balance and plans to attend therapy at a balance clinic close to her home in the spring.  Her main concern today is excessive  sedation in the morning and it is difficult for her to get out of bed.  I decreased her dose of trazodone to 50 mg at bedtime with instructions that she can take up to 100 mg at bedtime.  Lamotrigine continues for mood stabilization.  Abilify is effective in managing her depression and mood dysregulation.  She reports no mood swings.   Eulalia is still interested in having talk therapy.  I will reach out to [facility] Re[facility] to see about contacting Eulalia to begin sessions.  Eulalia is in agreement with this plan..    Medication side effects and alternatives were reviewed. Health promotion activities recommended and reviewed today. All questions addressed. Education and counseling completed regarding risks and benefits of medications and psychotherapy options.    Treatment Plan:      1.  Decrease trazodone to 50 to 100 mg at bedtime  2.  Continue lamotrigine 300 mg daily  3.  Continue aripiprazole 5 mg daily  4.  I will contact [facility] to inquire about counseling therapy for you    Continue all other medications as reviewed per electronic medical record today.   Safety plan reviewed. To the Emergency Department as needed or call after hours crisis line at [phone] or [phone]. [place] Crisis Text Line. Text [place] to 574142 or Suicide LifeLine Chat: suicidepreventionlifeline.org/chat/  To schedule individual or family therapy, call [facility] at [phone]  Schedule an appointment with me in 3 months or sooner as needed. Call [facility] at [phone] to schedule.  Follow up with primary care provider as planned or for acute medical concerns.  Call the psychiatric nurse line with medication questions or concerns at [phone]  MyChart may be used to communicate with your provider, but this is not intended to be used for emergencies.    Crisis Resources:    National Suicide Prevention Lifeline: 887.881.1482 (TTY: 692.111.5427). Call anytime for help.   (www.suicidepreventionlifeline.org)  National [place] on Mental Illness (www.rey.org): 756.409.5458 or 604-627-8171.   Mental Health Association (www.mentalhealth.org): 499.376.5534 or 816-746-7360.  [place] Crisis Text Line: Text [place] to 166205  Suicide LifeLine Chat: suicideEventCombo.org/chat    Administrative Billing:   Time spent with patient includes counseling and coordination of care regarding above diagnoses and treatment plan.    Patient Status:  This is a continuous care patient and medications will be prescribed by the psychiatric provider until further indicated.    Signed:   GLORIA Mims-BC   Psychiatry

## 2023-12-11 NOTE — PATIENT INSTRUCTIONS
"Patient Education   The Panel Psychiatry Program  What to Expect  Here's what to expect in the Panel Psychiatry Program.   About the program  You'll be meeting with a psychiatric doctor to check your mental health. A psychiatric doctor helps you deal with troubling thoughts and feelings by giving you medicine. They'll make sure you know the plan for your care. You may see them for a long time. When you're feeling better, they may refer you back to seeing your family doctor.   If you have any questions, we'll be glad to talk to you.  About visits  Be open  At your visits, please talk openly about your problems. It may feel hard, but it's the best way for us to help you.  Cancelling visits  If you can't come to your visit, please call us right away at [phone]. If you don't cancel at least 24 hours (1 full day) before your visit, that's \"late cancellation.\"  Not showing up for your visits  Being very late is the same as not showing up. You'll be a \"no show\" if:  You're more than 15 minutes late for a 30-minute (half hour) visit.  You're more than 30 minutes late for a 60-minute (full hour) visit.  If you cancel late or don't show up 2 times within 6 months, we may end your care.  Getting help between visits  If you need help between visits, you can call us Monday to Friday from 8 a.m. to 4:30 p.m. at [phone].  Emergency care  Call 911 or go to the nearest emergency department if your life or someone else's life is in danger.  Call 988 anytime to reach the national Suicide and Crisis hotline.  Medicine refills  To refill your medicine, call your pharmacy. You can also call [facility]'s Behavioral Access at [phone], Monday to Friday, 8 a.m. to 4:30 p.m. It can take 1 to 3 business days to get a refill.   Forms, letters, and tests  You may have papers to fill out, like FMLA, short-term disability, and workability. We can help you with these forms at your visits, but you must have an " appointment. You may need more than 1 visit for this, to be in an intensive therapy program, or both.  Before we can give you medicine for ADHD, we may refer you to get tested for it or confirm it another way.  We may not be able to give you an emotional support animal letter.  We don't do mental health checks ordered by the court.   We don't do mental health testing, but we can refer you to get tested.   Thank you for choosing us for your care.  For informational purposes only. Not to replace the advice of your health care provider. Copyright   2022 [facility]. All rights reserved. [employer] 309138 - 12/22.       Treatment Plan:      1.  Decrease trazodone to 50 to 100 mg at bedtime  2.  Continue lamotrigine 300 mg daily  3.  Continue aripiprazole 5 mg daily  4.  I will contact Libra Reza to inquire about counseling therapy for you    Continue all other medications as reviewed per electronic medical record today.   Safety plan reviewed. To the Emergency Department as needed or call after hours crisis line at [phone] or [phone]. [place] Crisis Text Line. Text [place] to 369475 or Suicide LifeLine Chat: suicidepreventionlifeline.org/chat/  To schedule individual or family therapy, call [facility] at [phone]  Schedule an appointment with me in 3 months or sooner as needed. Call [facility] at [phone] to schedule.  Follow up with primary care provider as planned or for acute medical concerns.  Call the psychiatric nurse line with medication questions or concerns at [phone]  MyChart may be used to communicate with your provider, but this is not intended to be used for emergencies.    Crisis Resources:    National Suicide Prevention Lifeline: 160.688.2796 (TTY: 335.168.2279). Call anytime for help.  (www.suicidepreventionlifeline.org)  National [place] on Mental Illness (www.rey.org): 734.954.8721 or 369-110-6871.   Mental Health Association  (www.mentalhealth.org): 143-347-0384 or 356-595-7158.  [place] Crisis Text [place]: Text [place] to 782791  Suicide LifeLine Chat: suicidepreventionlifeline.org/chat

## 2023-12-12 DIAGNOSIS — F31.31 BIPOLAR AFFECTIVE DISORDER, CURRENTLY DEPRESSED, MILD (H): ICD-10-CM

## 2023-12-12 RX ORDER — LEVOTHYROXINE SODIUM 25 UG/1
25 TABLET ORAL DAILY
Qty: 90 TABLET | Refills: 3 | Status: SHIPPED | OUTPATIENT
Start: 2023-12-12

## 2023-12-12 NOTE — TELEPHONE ENCOUNTER
RN received a refill request via Right Fax from [facility] for levothyroxine (SYNTHROID/LEVOTHROID) 25 MCG tablet     Date of Last Office Visit: 12/11/23  Date of Next Office Visit: 3/4/24  No shows since last visit: None  Cancellations since last visit: None    Medication requested: levothyroxine (SYNTHROID/LEVOTHROID) 25 MCG tablet  Date last ordered: 10/5/23 Qty: 90 Refills: 0 under SHANTELL Vang NP     Review of [place] ?: no, n/a for this medication      Lapse in medication adherence greater than 5 days?: NO  If yes, call patient and gather details: n/a  Medication refill request verified as identical to current order?: Yes  Result of Last DAM, VPA, Li+ Level, CBC, or Carbamazepine Level (at or since last visit):  TSH with Free T4 to reflex checked on 5/26/23 = 2.47    Last visit treatment plan:     Assessment:  Emperatriz Esteban has become social with friends and family more often.  She continues to be concerned about her balance and plans to attend therapy at a balance clinic close to her home in the spring.  Her main concern today is excessive sedation in the morning and it is difficult for her to get out of bed.  I decreased her dose of trazodone to 50 mg at bedtime with instructions that she can take up to 100 mg at bedtime.  Lamotrigine continues for mood stabilization.  Abilify is effective in managing her depression and mood dysregulation.  She reports no mood swings.   Eulalia is still interested in having talk therapy.  I will reach out to Libra Reza to see about contacting Eulalia to begin sessions.  Eulalia is in agreement with this plan..     Medication side effects and alternatives were reviewed. Health promotion activities recommended and reviewed today. All questions addressed. Education and counseling completed regarding risks and benefits of medications and psychotherapy options.     Treatment Plan:  1.  Decrease trazodone to 50 to 100 mg at bedtime  2.  Continue lamotrigine 300 mg daily  3.   Continue aripiprazole 5 mg daily  4.  I will contact Libra Reza to inquire about counseling therapy for you     Continue all other medications as reviewed per electronic medical record today.   Safety plan reviewed. To the Emergency Department as needed or call after hours crisis line at [phone] or [phone]. [place] Crisis Text [place]. Text [place] to 570938 or Suicide LifeLine Chat: suicidepreventionlifeline.org/chat/  To schedule individual or family therapy, call [facility] at [phone]  Schedule an appointment with me in 3 months or sooner as needed. Call [facility] at [phone] to schedule.  Follow up with primary care provider as planned or for acute medical concerns.  Call the psychiatric nurse line with medication questions or concerns at [phone]  MyChart may be used to communicate with your provider, but this is not intended to be used for emergencies.    []Medication refilled per  Medication Refill in Ambulatory Care  policy.  [x]Medication unable to be refilled by RN due to criteria not met as indicated below:    []Eligibility - not seen in the last year   []Supervision - no future appointment   []Compliance - no shows, cancellations or lapse in therapy   []Verification - order discrepancy   []Controlled medication   []Medication not included in policy   [x]90-day supply request   []Other    A 90 day refill with no additional refills is pended for provider review and approval.    Aria Cordero RN on 12/12/2023 at 12:59 PM

## 2024-01-09 ENCOUNTER — FCC EXTENDED DOCUMENTATION (OUTPATIENT)
Dept: PSYCHOLOGY | Facility: CLINIC | Age: 69
End: 2024-01-09
Payer: MEDICARE

## 2024-01-09 NOTE — PROGRESS NOTES
Discharge Summary  Multiple Sessions    Client Name: Emperatriz Esteban MRN#: [id number] YOB: 1955      Intake / Discharge Date: 1/09/2024      DSM5 Diagnoses: (Sustained by DSM5 Criteria Listed Above)  Diagnoses: 300.02 (F41.1) Generalized Anxiety Disorder  Psychosocial & Contextual Factors: Past hx of SI  WHODAS 2.0 (12 item) Score: n/a          Presenting Concern:  Anxiety - coping skills       Reason for Discharge:  Client did not return      Disposition at Time of Last Encounter:   Comments:   No contact within the last 90 days     Risk Management:   Client has had a history of suicidal ideation:    A safety and risk management plan has been developed including: Patient consented to co-developed safety plan on 12/12/2022.  Safety and risk management plan was reviewed.   Patient agreed to use safety plan should any safety concerns arise.  A copy was made available to the patient.      Referred To:  - May return to [facility] for therapy services        Maria Del Carmen Leal, CUATE   1/9/2024

## 2024-01-10 ENCOUNTER — MYC MEDICAL ADVICE (OUTPATIENT)
Dept: PSYCHIATRY | Facility: CLINIC | Age: 69
End: 2024-01-10
Payer: MEDICARE

## 2024-01-11 ENCOUNTER — PATIENT OUTREACH (OUTPATIENT)
Dept: CARE COORDINATION | Facility: CLINIC | Age: 69
End: 2024-01-11
Payer: MEDICARE

## 2024-01-11 NOTE — TELEPHONE ENCOUNTER
RN reviewed MyC message from patient re: aripiprazole    RN reviewed the following:  aripiprazole (ABILIFY) 5 MG tablet 90 tablet 1 12/11/2023 -- No   Sig - Route: Take 1 tablet (5 mg) by mouth daily - Oral   Sent to pharmacy as: ARIPiprazole 5 MG Oral Tablet (ABILIFY)   Class: E-Prescribe   Order: [id number]   E-Prescribing Status: Receipt confirmed by pharmacy (12/11/2023 12:50 PM CST)     Pharmacy    [id number] - [place], [place] - [address]     RN called [facility] pharmacy and confirmed they do have refills on file in which they do.  The pharmacy staff member indicated they need the patient's new insurance information.     RN called the patient at the number listed on her profile and explained the above information, the patient verbalized understanding and said she would call [facility] pharmacy and speak directly with the pharmacy staff. .    Aria Cordero RN on 1/11/2024 at 8:55 AM

## 2024-01-18 ENCOUNTER — TELEPHONE (OUTPATIENT)
Dept: FAMILY MEDICINE | Facility: CLINIC | Age: 69
End: 2024-01-18
Payer: MEDICARE

## 2024-01-18 DIAGNOSIS — R73.03 PREDIABETES: ICD-10-CM

## 2024-01-18 RX ORDER — ATORVASTATIN CALCIUM 20 MG/1
20 TABLET, FILM COATED ORAL DAILY
Qty: 90 TABLET | Refills: 2 | Status: CANCELLED | OUTPATIENT
Start: 2024-01-18

## 2024-01-19 RX ORDER — ATORVASTATIN CALCIUM 20 MG/1
20 TABLET, FILM COATED ORAL DAILY
Qty: 90 TABLET | Refills: 0 | Status: SHIPPED | OUTPATIENT
Start: 2024-01-19

## 2024-01-19 NOTE — TELEPHONE ENCOUNTER
Called patient and confirmed new pharmacy is [facility].  Remaining atorvastatin refill sent per patient request.    Bindu PRICE RN  [facility]

## 2024-01-28 ENCOUNTER — MYC MEDICAL ADVICE (OUTPATIENT)
Dept: FAMILY MEDICINE | Facility: CLINIC | Age: 69
End: 2024-01-28

## 2024-01-28 ENCOUNTER — OFFICE VISIT (OUTPATIENT)
Dept: URGENT CARE | Facility: URGENT CARE | Age: 69
End: 2024-01-28
Payer: MEDICARE

## 2024-01-28 VITALS
RESPIRATION RATE: 18 BRPM | DIASTOLIC BLOOD PRESSURE: 75 MMHG | BODY MASS INDEX: 27.32 KG/M2 | WEIGHT: 170 LBS | OXYGEN SATURATION: 100 % | HEIGHT: 66 IN | SYSTOLIC BLOOD PRESSURE: 142 MMHG | TEMPERATURE: 97.4 F | HEART RATE: 74 BPM

## 2024-01-28 DIAGNOSIS — F41.9 ANXIETY: ICD-10-CM

## 2024-01-28 DIAGNOSIS — R25.1 TREMOR: Primary | ICD-10-CM

## 2024-01-28 PROCEDURE — 99213 OFFICE O/P EST LOW 20 MIN: CPT | Performed by: PHYSICIAN ASSISTANT

## 2024-01-28 ASSESSMENT — ENCOUNTER SYMPTOMS
NERVOUS/ANXIOUS: 1
FATIGUE: 0
TREMORS: 1
HEADACHES: 1
DECREASED CONCENTRATION: 0
WHEEZING: 0
FEVER: 0
SPEECH DIFFICULTY: 0
PALPITATIONS: 0
NUMBNESS: 0
WEAKNESS: 0
CONFUSION: 0
SHORTNESS OF BREATH: 0
DYSPHORIC MOOD: 0
LIGHT-HEADEDNESS: 0
DIZZINESS: 0

## 2024-01-28 NOTE — PROGRESS NOTES
Tremor    Anxiety    I discussed with the patient the possibility of having an EKG done as well as CBC, TSH, and comprehensive metabolic panel.  Patient refused at this time I would like to follow-up with her primary care provider or in the ER if symptoms return.    Patient was advised to return to clinic for reevaluation (either UC or PCP) if symptoms do not improve in 2 days. Patient educated on red flag symptoms and asked to go directly to the ED if these symptoms present themselves.     Xavi Raymond PA-C  [facility]    Subjective   68 year old who presents to clinic today for the following health issues:    Urgent Care, Balance/ Vestibular, Nausea, and Shaking       HPI     Pt in clinic to have eval for nausea (No vomiting), tremors, anxiety (Patient felt the anxiety after the onset of the symptoms), loss of appetite and feeling off balance. Patient felt these symptoms this morning around 11 and was having waxing and waning symptoms over the next 2 hours. Patient states that she has a history of feeling off balance. Patient currently has some tightness in her chest.      Denies heart palpitations, shortness of breath, or sharp heart pains. Patient denies any new medications. She denies any recent stress.     Patient has myopic degeneration. She thinks this contributes to her feeling of being off balance.     Patient states that all of her symptoms resolved within a 1-2 hour period.     Review of Systems   Review of Systems   Constitutional:  Negative for fatigue and fever.   Respiratory:  Negative for shortness of breath and wheezing.    Cardiovascular:  Negative for chest pain, palpitations and leg swelling.   Neurological:  Positive for tremors and headaches (intermitent headaches). Negative for dizziness, syncope, speech difficulty, weakness, light-headedness and numbness.   Psychiatric/Behavioral:  Negative for confusion, decreased concentration and dysphoric mood. The patient is  nervous/anxious.       See HPI    Objective    Temp: 97.4  F (36.3  C) Temp src: Temporal BP: (!) 142/75 Pulse: 74   Resp: 18 SpO2: 100 %       Physical Exam   Physical Exam  Constitutional:       General: She is not in acute distress.     Appearance: Normal appearance. She is normal weight. She is not ill-appearing, toxic-appearing or diaphoretic.   HENT:      Head: Normocephalic and atraumatic.      Right Ear: Tympanic membrane, ear canal and external ear normal. There is no impacted cerumen.      Left Ear: Tympanic membrane, ear canal and external ear normal. There is no impacted cerumen.      Nose: Nose normal. No congestion or rhinorrhea.      Mouth/Throat:      Mouth: Mucous membranes are moist.      Pharynx: Oropharynx is clear. No oropharyngeal exudate or posterior oropharyngeal erythema.   Cardiovascular:      Rate and Rhythm: Normal rate and regular rhythm.      Pulses: Normal pulses.      Heart sounds: Normal heart sounds. No murmur heard.     No friction rub. No gallop.   Pulmonary:      Effort: Pulmonary effort is normal. No respiratory distress.      Breath sounds: No stridor. No wheezing, rhonchi or rales.   Chest:      Chest wall: No tenderness.   Abdominal:      General: Abdomen is flat. Bowel sounds are normal. There is no distension.      Palpations: There is no shifting dullness, fluid wave, hepatomegaly, splenomegaly, mass or pulsatile mass.      Tenderness: There is no abdominal tenderness. There is no right CVA tenderness, left CVA tenderness, guarding or rebound. Negative signs include Lane's sign, Rovsing's sign, McBurney's sign, psoas sign and obturator sign.      Hernia: No hernia is present.   Lymphadenopathy:      Cervical: No cervical adenopathy.   Neurological:      General: No focal deficit present.      Mental Status: She is alert and oriented to person, place, and time. Mental status is at baseline.      Cranial Nerves: No cranial nerve deficit.      Sensory: No sensory deficit.       Motor: No weakness.      Coordination: Coordination normal.      Gait: Gait normal.   Psychiatric:         Mood and Affect: Mood normal.         Behavior: Behavior normal.         Thought Content: Thought content normal.         Judgment: Judgment normal.          No results found for this or any previous visit (from the past 24 hour(s)).

## 2024-03-04 ENCOUNTER — VIRTUAL VISIT (OUTPATIENT)
Dept: PSYCHIATRY | Facility: CLINIC | Age: 69
End: 2024-03-04
Payer: MEDICARE

## 2024-03-04 DIAGNOSIS — F99 INSOMNIA DUE TO OTHER MENTAL DISORDER: ICD-10-CM

## 2024-03-04 DIAGNOSIS — F41.1 GAD (GENERALIZED ANXIETY DISORDER): ICD-10-CM

## 2024-03-04 DIAGNOSIS — F31.31 BIPOLAR AFFECTIVE DISORDER, CURRENTLY DEPRESSED, MILD (H): Primary | ICD-10-CM

## 2024-03-04 DIAGNOSIS — F51.05 INSOMNIA DUE TO OTHER MENTAL DISORDER: ICD-10-CM

## 2024-03-04 PROCEDURE — 99214 OFFICE O/P EST MOD 30 MIN: CPT | Mod: 95 | Performed by: NURSE PRACTITIONER

## 2024-03-04 RX ORDER — TRAZODONE HYDROCHLORIDE 50 MG/1
50-100 TABLET, FILM COATED ORAL
Qty: 180 TABLET | Refills: 1 | Status: SHIPPED | OUTPATIENT
Start: 2024-03-04 | End: 2024-07-19

## 2024-03-04 RX ORDER — ARIPIPRAZOLE 5 MG/1
5 TABLET ORAL DAILY
Qty: 90 TABLET | Refills: 1 | Status: SHIPPED | OUTPATIENT
Start: 2024-03-04 | End: 2024-04-19

## 2024-03-04 RX ORDER — LAMOTRIGINE 150 MG/1
TABLET ORAL
COMMUNITY
Start: 2024-03-04 | End: 2024-04-19

## 2024-03-04 ASSESSMENT — ANXIETY QUESTIONNAIRES
GAD7 TOTAL SCORE: 15
7. FEELING AFRAID AS IF SOMETHING AWFUL MIGHT HAPPEN: NEARLY EVERY DAY
GAD7 TOTAL SCORE: 15
7. FEELING AFRAID AS IF SOMETHING AWFUL MIGHT HAPPEN: NEARLY EVERY DAY
5. BEING SO RESTLESS THAT IT IS HARD TO SIT STILL: NOT AT ALL
3. WORRYING TOO MUCH ABOUT DIFFERENT THINGS: NEARLY EVERY DAY
6. BECOMING EASILY ANNOYED OR IRRITABLE: NOT AT ALL
2. NOT BEING ABLE TO STOP OR CONTROL WORRYING: NEARLY EVERY DAY
8. IF YOU CHECKED OFF ANY PROBLEMS, HOW DIFFICULT HAVE THESE MADE IT FOR YOU TO DO YOUR WORK, TAKE CARE OF THINGS AT HOME, OR GET ALONG WITH OTHER PEOPLE?: SOMEWHAT DIFFICULT
GAD7 TOTAL SCORE: 15
1. FEELING NERVOUS, ANXIOUS, OR ON EDGE: NEARLY EVERY DAY
4. TROUBLE RELAXING: NEARLY EVERY DAY
IF YOU CHECKED OFF ANY PROBLEMS ON THIS QUESTIONNAIRE, HOW DIFFICULT HAVE THESE PROBLEMS MADE IT FOR YOU TO DO YOUR WORK, TAKE CARE OF THINGS AT HOME, OR GET ALONG WITH OTHER PEOPLE: SOMEWHAT DIFFICULT

## 2024-03-04 ASSESSMENT — PATIENT HEALTH QUESTIONNAIRE - PHQ9
SUM OF ALL RESPONSES TO PHQ QUESTIONS 1-9: 4
SUM OF ALL RESPONSES TO PHQ QUESTIONS 1-9: 4
10. IF YOU CHECKED OFF ANY PROBLEMS, HOW DIFFICULT HAVE THESE PROBLEMS MADE IT FOR YOU TO DO YOUR WORK, TAKE CARE OF THINGS AT HOME, OR GET ALONG WITH OTHER PEOPLE: SOMEWHAT DIFFICULT

## 2024-03-04 ASSESSMENT — PAIN SCALES - GENERAL: PAINLEVEL: NO PAIN (0)

## 2024-03-04 NOTE — PATIENT INSTRUCTIONS
"Patient Education   The Panel Psychiatry Program  What to Expect  Here's what to expect in the Panel Psychiatry Program.   About the program  You'll be meeting with a psychiatric doctor to check your mental health. A psychiatric doctor helps you deal with troubling thoughts and feelings by giving you medicine. They'll make sure you know the plan for your care. You may see them for a long time. When you're feeling better, they may refer you back to seeing your family doctor.   If you have any questions, we'll be glad to talk to you.  About visits  Be open  At your visits, please talk openly about your problems. It may feel hard, but it's the best way for us to help you.  Cancelling visits  If you can't come to your visit, please call us right away at [phone]. If you don't cancel at least 24 hours (1 full day) before your visit, that's \"late cancellation.\"  Not showing up for your visits  Being very late is the same as not showing up. You'll be a \"no show\" if:  You're more than 15 minutes late for a 30-minute (half hour) visit.  You're more than 30 minutes late for a 60-minute (full hour) visit.  If you cancel late or don't show up 2 times within 6 months, we may end your care.  Getting help between visits  If you need help between visits, you can call us Monday to Friday from 8 a.m. to 4:30 p.m. at [phone].  Emergency care  Call 911 or go to the nearest emergency department if your life or someone else's life is in danger.  Call 988 anytime to reach the national Suicide and Crisis hotline.  Medicine refills  To refill your medicine, call your pharmacy. You can also call [facility]'s Behavioral Access at [phone], Monday to Friday, 8 a.m. to 4:30 p.m. It can take 1 to 3 business days to get a refill.   Forms, letters, and tests  You may have papers to fill out, like FMLA, short-term disability, and workability. We can help you with these forms at your visits, but you must have an " appointment. You may need more than 1 visit for this, to be in an intensive therapy program, or both.  Before we can give you medicine for ADHD, we may refer you to get tested for it or confirm it another way.  We may not be able to give you an emotional support animal letter.  We don't do mental health checks ordered by the court.   We don't do mental health testing, but we can refer you to get tested.   Thank you for choosing us for your care.  For informational purposes only. Not to replace the advice of your health care provider. Copyright   2022 [facility]. All rights reserved. [employer] 445788 - 12/22.       Treatment Plan:      1.  Decrease lamotrigine to 1-1/2 tablets daily for 14 days then 1 tablet daily thereafter  2.  Continue aripiprazole 5 mg daily  3.  Continue trazodone 50 to 100 mg at bedtime as needed for insomnia    Continue all other medical directions per primary care provider.   Continue all other medications as reviewed per electronic medical record today.   Safety plan reviewed. To the Emergency Department as needed or call after hours crisis line at [phone] or [phone]. [place] Crisis Text Line: Text [place] to 235337  or  Suicide LifeLine Chat: suicidepreventionlifeline.org/chat/  To schedule individual or family therapy, call [facility] at [phone].   Schedule an appointment with me in 6 weeks or sooner as needed.  Call [facility] at [phone] to schedule.  Follow up with primary care provider as planned or for acute medical concerns.  Call the psychiatric nurse line with medication questions or concerns at [phone].  "[employer]hart may be used to communicate with your provider, but this is not intended to be used for emergencies.        Crisis Resources   The EmPath is an adults only unit located at [facility] in [place] is a short term (generally less than 23 hour stay) designed for crisis intervention and stabilization. Pts  have the opportunity to meet quickly with a behavioral health team for evaluation in a calm and peaceful therapuetic environment. To be evaluated for admission pts are triaged throught the [facility] ED.      The following hotlines are for both adults and children. The and are open 24 hours a day, 7 days a week unless noted otherwise.        Crisis Lines      Crisis Text Line  Text 721344  You will be connected with a trained live crisis counselor to provide support.        Gambling Hotline  5.127.228.0062 [hope]        línea de crisis española  753.081.9196        [facility] PlayMob Helpline  463.367.9923        National Hope Line  0.464.524.2696 [hope]        National Suicide Prevention Lifeline  Free and confidential support  988 or 1.368.228.TALK [8255]  http://suicidepreventionlifeline.org        The [employer] (LGBTQ Youth Crisis Line)  0.903.309.8810  text START to 041-055        's Crisis Line  6.600.362.9363 (Press 1)  or text 434482    [facility] Mental Health Crisis Response  Within [place], call **CRISIS [**455319] to be connected to a mental health professional who can assist you.        [facility] Crisis  [phone]      [facility] Mobile Crisis  [phone]      [facility] Crisis  [phone]      [facility] Mobile Crisis  750.236.9138 (adults)  [phone] (children)      [facility] Mobile Crisis  125.242.6357 (adults)  [phone] (children)      [facility] Mobile Crisis  [phone]      [facility] Mobile Crisis  [phone]    Community Resources      Fast Tracker  Linking people to mental health and substance use disorder resources  fasttrackermn.org        [place] Mental Health Warmline  Peer to peer support  5 pm to 9 am 7 days/week  2.647.041.6392  https://mnwitw.org/lawrence        National [place] on Mental Illness (CAN)  555.747.4190 or 1.888.CAN.HELPS  https://namimn.org/        [facility] for  Adult Mental Health  [facility] only   [address] [place] [address]  [phone]        [facility]  Free mental health counseling  https://walkin.org/  612.870.0565 X2    Mental Health Apps      Calm Harm  https://calmharm.co.uk/      My3  https://my3app.org/      Beau Safety Plan  https://www.mysafetyplan.org/

## 2024-03-04 NOTE — PROGRESS NOTES
"Virtual Visit Details    Type of service:  Video Visit   Video Start Time: 11:14 AM  Video End Time:11:41 AM    Originating Location (pt. Location): Home    Distant Location (provider location):  On-site  Platform used for Video Visit: [facility]           Outpatient Psychiatric Progress Note    Name: Emperatriz Esteban   : 1955                    Primary Care Provider: Nayana Wheat MD   Therapist: None    PHQ-9 scores:      2023     8:04 AM 10/5/2023    10:04 AM 2023    12:24 PM   PHQ-9 SCORE   PHQ-9 Total Score MyChart 1 (Minimal depression) 2 (Minimal depression) 1 (Minimal depression)   PHQ-9 Total Score 1    1 2 1       VAL-7 scores:      1/10/2023     4:37 PM 2023     1:46 PM   VAL-7 SCORE   Total Score  7 (mild anxiety)   Total Score 9 7       Patient Identification:    Patient is a 68 year old year old, single  White Not  or  female  who presents for return visit with me.  Patient is currently retired. Patient attended the session alone. Patient prefers to be called: \"Eulalia\".    Current medications include: ARIPiprazole (ABILIFY) 5 MG tablet, Take 1 tablet (5 mg) by mouth daily  aspirin 81 MG EC tablet, Take 81 mg by mouth daily   atorvastatin (LIPITOR) 20 MG tablet, Take 1 tablet (20 mg) by mouth daily  brimonidine (ALPHAGAN) 0.2 % ophthalmic solution, Place 1 drop into both eyes 2 times daily (Patient not taking: Reported on 2024)  dorzolamide-timolol (COSOPT) 2-0.5 % ophthalmic solution, Place 1 drop into both eyes 2 times daily  hypromellose (GENTEAL SEVERE) ophthalmic gel 0.3%, USE once at night in the eye. Both eyes  lamoTRIgine (LAMICTAL) 150 MG tablet, TAKE TWO TABLETS BY MOUTH DAILY.  levothyroxine (SYNTHROID/LEVOTHROID) 25 MCG tablet, Take 1 tablet (25 mcg) by mouth daily  Multiple Minerals-Vitamins (ADVANCED CALCIUM/D/MAGNESIUM) TABS, Take 1,500 mg by mouth daily   multivitamin (OCUVITE) TABS, Take 1 tablet by mouth daily   omega 3 1000 MG CAPS, Take 3 " g by mouth daily   traZODone (DESYREL) 50 MG tablet, Take 1-2 tablets ( mg) by mouth nightly as needed for sleep    No current facility-administered medications on file prior to visit.       The [place] Prescription Monitoring Program has been reviewed and there are no concerns about diversionary activity for controlled substances at this time.      I was able to review most recent Primary Care Provider, specialty provider, and therapy visit notes that I have access to.     Today, patient reports that she recently fell when she went to visit her cousin's.  She hit her head on the cement and sustained a brain bleed and a fracture.  She is home and denies any cognitive impairment.  She is fearful to move about on her own.  We discussed side effects potential of taking lamotrigine and Abilify and she agreed today to adjust the medications in case this was contributing to her unsteady gait.  She is working with other specialists to become more steady.         has a past medical history of Bipolar 1 disorder (H).    Social history updates:    Eulalia continues to live alone.  She identifies several friends and family as support.    Substance use updates:    No alcohol use reported  Tobacco use: No    Vital Signs:   There were no vitals taken for this visit.    Labs:    Most recent laboratory results reviewed and no new labs.     Mental Status Examination:  Appearance: awake, alert, casual dress, and mild distress  Attitude: cooperative  Eye Contact:  adequate  Gait and Station: Dizziness and Falls  Psychomotor Behavior:  fidgeting  Oriented to:  time, person, and place  Attention Span and Concentration:  Normal  Speech:   vtspeech: clear, coherent and Speaks: English  Mood:  anxious and depressed  Affect:  mood congruent  Associations:  no loose associations  Thought Process:  goal oriented  Thought Content:  no evidence of suicidal ideation or homicidal ideation, no auditory hallucinations present, and no visual  hallucinations present  Recent and Remote Memory:  intact Not formally assessed. No amnesia.  Fund of Knowledge: appropriate  Insight:  good  Judgment: good  Impulse Control:  good    Suicide Risk Assessment:  Today Emperatriz Esteban reports no thoughts to harm themself or others. In addition, there are notable risk factors for self-harm, including age, single status, and anxiety. However, risk is mitigated by commitment to family, history of seeking help when needed, future oriented, denies suicidal intent or plan, and denies homicidal ideation, intent, or plan. Therefore, based on all available evidence including the factors cited above, Emperatriz Esteban does not appear to be at imminent risk for self-harm, does not meet criteria for a 72-hr hold, and therefore remains appropriate for ongoing outpatient level of care.  A thorough assessment of risk factors related to suicide and self-harm have been reviewed and are noted above. The patient convincingly denies suicidality on several occasions. Local community safety resources printed and reviewed for patient to use if needed. There was no deceit detected, and the patient presented in a manner that was believable.     DSM5 Diagnosis:  296.89 Bipolar II Disorder Depressed and mild  300.02 (F41.1) Generalized Anxiety Disorder  780.52 (G47.00) Insomnia Disorder   With non-sleep disorder mental comorbidity  Episodic      Medical comorbidities include:   Patient Active Problem List    Diagnosis Date Noted    Bilateral low back pain without sciatica 07/11/2023     Priority: Medium    Other specified glaucoma 05/26/2023     Priority: Medium    Impaired fasting glucose 12/16/2021     Priority: Medium    Prediabetes 09/02/2016     Priority: Medium    Bipolar affective disorder (H) 09/01/2016     Priority: Medium     Follows with psychiatry in [place], [place] (in [place] for a few months)        Other specified hypothyroidism 09/01/2016     Priority: Medium       Assessment:    Emperatriz JACOBO  Eulalia agreed to decrease the dose of Lamictal as we discussed the potential to experience ataxia as a side effect.  She is apprehensive about changing her medications for her bipolar disorder.  Her mood however, has been stable for quite some time.  She will continue Abilify 5 mg daily to help with mood regulation.  Trazodone continues at bedtime as needed for insomnia..    Medication side effects and alternatives were reviewed. Health promotion activities recommended and reviewed today. All questions addressed. Education and counseling completed regarding risks and benefits of medications and psychotherapy options.    Treatment Plan:      1.  Decrease lamotrigine to 1-1/2 tablets daily for 14 days then 1 tablet daily thereafter  2.  Continue aripiprazole 5 mg daily  3.  Continue trazodone 50 to 100 mg at bedtime as needed for insomnia    Continue all other medications as reviewed per electronic medical record today.   Safety plan reviewed. To the Emergency Department as needed or call after hours crisis line at [phone] or [phone]. [place] Crisis Text Line. Text [place] to 677977 or Suicide LifeLine Chat: suicidepreventionlifeline.org/chat/  To schedule individual or family therapy, call [facility] at [phone]  Schedule an appointment with me in 4 weeks or sooner as needed. Call [facility] at [phone] to schedule.  Follow up with primary care provider as planned or for acute medical concerns.  Call the psychiatric nurse line with medication questions or concerns at [phone]  MyChart may be used to communicate with your provider, but this is not intended to be used for emergencies.        Crisis Resources   The [facility] is an adults only unit located at [facility] is a short term (generally less than 23 hour stay) designed for crisis intervention and stabilization. Pts have the opportunity to meet quickly with a behavioral health team for evaluation  in a calm and peaceful therapuetic environment. To be evaluated for admission pts are triaged throught the [facility] ED.      The following hotlines are for both adults and children. The and are open 24 hours a day, 7 days a week unless noted otherwise.        Crisis Lines      Crisis Text Line  Text 946238  You will be connected with a trained live crisis counselor to provide support.        Gambling Hotline  5.975.833.5166 [hope]        línea de crisis española  599.097.0832        [facility] [address] Helpline  489.800.9946        National Hope Line  1.752.664.8687 [hope]        National Suicide Prevention Lifeline  Free and confidential support  988 or 1.017.214.TALK [8255]  http://suicidepreventionlifeline.org        The [employer] (LGBTQ Youth Crisis Line)  8.758.077.6896  text START to 114-724        [facility] Crisis Line  2.163.714.1497 (Press 1)  or text 889345    [facility] Mental Health Crisis Response  Within [place], call **CRISIS [**447951] to be connected to a mental health professional who can assist you.        [facility] Crisis  [phone]      [facility] Mobile Crisis  [phone]      [facility] Crisis  [phone]      [facility] Mobile Crisis  [phone] (adults)  [phone] (children)      [facility] Mobile Crisis  [phone] (adults)  [phone] (children)      [facility] Mobile Crisis  [phone]      [facility]  [phone]    Community Resources      Fast Tracker  Linking people to mental health and substance use disorder resources  fasttrackermn.org        [place] Mental Health Warmline  Peer to peer support  5 pm to 9 am 7 days/week  5.855.703.6763  https://mnwitw.org/lawrence        National [place] on Mental Illness (CAN)  788.598.9150 or 1.888.CAN.HELPS  https://namimn.org/        [facility]  [facility] residents only   [address] [place], [place] [address]Jean Carlos Jean Carlos   Bruce  [phone]        [facility]  Free mental health counseling  https://walkin.org/  612.870.0565 X2    Mental Health Apps      Calm Harm  https://[employer].co.uk/      My3  https://my3app.org/      [place]au Safety Plan  https://www.Capablueafetyplan.org/   Administrative Billing:   Time spent with patient includes counseling and coordination of care regarding above diagnoses and treatment plan.    Patient Status:  This is a continuous care patient and medications will be prescribed by the psychiatric provider until further indicated.    Signed:   DENNY MimsP-BC   Psychiatry

## 2024-03-04 NOTE — NURSING NOTE
Is the patient currently in the state of [place]? YES    Visit mode:VIDEO    If the visit is dropped, the patient can be reconnected by: VIDEO VISIT: Text to cell phone:   Telephone Information:   Mobile [phone]       Will anyone else be joining the visit? NO  (If patient encounters technical issues they should call [phone])    How would you like to obtain your AVS? MyChart    Are changes needed to the allergy or medication list? No    Reason for visit: RECHECK    Jazzmine CHAVEZ

## 2024-03-21 ENCOUNTER — TELEPHONE (OUTPATIENT)
Dept: OPHTHALMOLOGY | Facility: CLINIC | Age: 69
End: 2024-03-21
Payer: MEDICARE

## 2024-03-21 NOTE — TELEPHONE ENCOUNTER
[facility] Call [facility]    Phone Message    May a detailed message be left on voicemail: yes     Reason for Call: Symptoms or Concerns     If patient has red-flag symptoms, warm transfer to triage line    Current symptom or concern: shadows infront of vision and balance disturbance, hx of fall with TBI 02/12/2024    Symptoms have been present for:  3 day(s)    Has patient previously been seen for this? Yes    By     Pt requesting to be seen ASAP for symptoms, next available was 05/01 pt informed of her recent TBI writer unable to schedule due to pt symptoms, please call back.     Are there any new or worsening symptoms? No    Action Taken: Other: [facility]     Travel Screening: Not Applicable

## 2024-03-21 NOTE — TELEPHONE ENCOUNTER
Spoke to pt around 3 PM    Hard to describe shadows in vision per pt    Been present for years, but now worsening/more shadows that are constant in vision times 3/day.    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 Pola Matthews tomorrow in open new time slot     Pt [facility].      Ciro Mendoza RN 3:59 PM 03/21/24

## 2024-03-22 ENCOUNTER — OFFICE VISIT (OUTPATIENT)
Dept: OPHTHALMOLOGY | Facility: CLINIC | Age: 69
End: 2024-03-22
Payer: MEDICARE

## 2024-03-22 DIAGNOSIS — H44.2B3 BOTH EYES AFFECTED BY DEGENERATIVE MYOPIA WITH MACULAR HOLE: Primary | ICD-10-CM

## 2024-03-22 DIAGNOSIS — H35.371 EPIRETINAL MEMBRANE (ERM) OF RIGHT EYE: Primary | ICD-10-CM

## 2024-03-22 PROCEDURE — 99214 OFFICE O/P EST MOD 30 MIN: CPT

## 2024-03-22 PROCEDURE — 92250 FUNDUS PHOTOGRAPHY W/I&R: CPT

## 2024-03-22 PROCEDURE — 99207 FUNDUS PHOTOS OU (BOTH EYES): CPT | Mod: 26

## 2024-03-22 PROCEDURE — 92134 CPTRZ OPH DX IMG PST SGM RTA: CPT

## 2024-03-22 PROCEDURE — G0463 HOSPITAL OUTPT CLINIC VISIT: HCPCS

## 2024-03-22 ASSESSMENT — TONOMETRY
OS_IOP_MMHG: 16
OD_IOP_MMHG: 17
IOP_METHOD: TONOPEN

## 2024-03-22 ASSESSMENT — REFRACTION_WEARINGRX
OS_SPHERE: -6.25
SPECS_TYPE: PAL
OD_ADD: +2.75
OD_SPHERE: -4.75
OS_AXIS: 155
OD_CYLINDER: +1.00
OS_ADD: +2.75
OS_CYLINDER: +1.50
OD_AXIS: 175

## 2024-03-22 ASSESSMENT — CONF VISUAL FIELD
OD_NORMAL: 1
METHOD: COUNTING FINGERS
OS_NORMAL: 1
OD_INFERIOR_TEMPORAL_RESTRICTION: 0
OD_SUPERIOR_NASAL_RESTRICTION: 0
OS_SUPERIOR_TEMPORAL_RESTRICTION: 0
OS_INFERIOR_NASAL_RESTRICTION: 0
OD_INFERIOR_NASAL_RESTRICTION: 0
OD_SUPERIOR_TEMPORAL_RESTRICTION: 0
OS_SUPERIOR_NASAL_RESTRICTION: 0
OS_INFERIOR_TEMPORAL_RESTRICTION: 0

## 2024-03-22 ASSESSMENT — VISUAL ACUITY
CORRECTION_TYPE: GLASSES
METHOD: SNELLEN - LINEAR
OS_CC: 20/30
OD_CC: 20/25

## 2024-03-22 ASSESSMENT — CUP TO DISC RATIO
OD_RATIO: 0.3
OS_RATIO: 0.4

## 2024-03-22 ASSESSMENT — EXTERNAL EXAM - LEFT EYE: OS_EXAM: NORMAL

## 2024-03-22 ASSESSMENT — EXTERNAL EXAM - RIGHT EYE: OD_EXAM: NORMAL

## 2024-03-22 NOTE — PROGRESS NOTES
CC: blurred vision OS    HPI: Ms Hassan is being seen today for blurred vision and shadows in the left eye that have worsened since her head trauma, she had sustained a  tramatic fall 2 months ago ( patient fell on face and sustained brain bleed)     PMHx:   DL   Hx stroke (TIA) was on daily aspirin however stopped after the brain bleed  Hx of brain bleed 2/24    Ct head 3/4/2024:   1. Interval resolution of left frontal subarachnoid hemorrhage with no new intracranial pathology.   2. Stable left orbital roof and frontal bone fracture with associated left frontal scalp hematoma.     Imaging:  OCT: 03/22/2024  Right eye: Epiretinal membrane with lamellar hole stable; subfoveal IS/OS disruption  Left eye: thin retina with mild irregularity of the retina surface, no IRF/SRF    Optos photos: 3.22.24  C/w clinical exam     Retina Laser procedures:  Right eye PRP   Left eye PRP    Intravitreal injections:  none    Assessment/ Plan: 03/22/2024       # Recent Head trauma 2/2024  # Left frontal subarachnoid hemorrhage  # Orbital roof and frontal bone fracture, LEFT   - The patient has fallen because of gait imbalance/instability and she thinks it is multifactorial in nature, she believes that both her eye and ears are causing her instability, she is not able to appreciate depth and has difficulty with stairs.  - She has a Hx of stroke and was chronically maintained on ASA however this was stopped after her injury and she is currently still off the ASA.  - Today the patient says she is doing better, her repeat CT shows improvement in the brain bleed. Her orbital fracture is also stable.  - She is mainly complaining of blurriness in the center of vision and she seems to be seeing shadows. No flashes of light and no floaters.   - fundus exam did not reveal any retinal tears or breaks, no VH or inflammation.  - She has full ductions and versions both eyes, no diplopia during exam.  - Fundus photo and OCT repeated today did not  show any significant changes compares to previous imaging done on 6/2023.  - Her symptoms are not typical of amaurosis fugax, however will keep low suspicion given that symptoms cannot be explained by exam today and she has a hx of stroke and is currently off ASA.   - I discussed the above findings with the patient, she will be seeing a someone for help with balance.  - We agreed that, for now she will observe her symptoms after being reassured that her eye is stable, and she will contact me via [employer] to tell me if there is improvement, if any worsening them may have low threshold to order brain imaging.  - RD warning signs also explained to patient   - Patient knows to come in if any alarming symptoms arise  - If all well then continue follow up with Dr Montes as planned.    # Myopic degeneration  - Shallow posterior staphylomas both eyes  - No retinal holes or breaks seen on fundus exam today  - RD warning signs explained   - patient knows to come in if any change in symptoms    # Lamellar hole right eye   -Stable compared to previous scans  - observe     # Primary open angle glaucoma (POAG) both eyes    Managed with topical drops    # history of Dry eye syndrome   - no complaints on exam today  - requested refill pf serum tears prescription: note sent to Dr Ryder  - Follows with Dr. Ryder      # Pseudophakic both eyes   Stable     # history of vitrectomy left eye   - floaterectomy  By Dr. Henok Bolton in [facility]      Freddy Matthews MD     [facility]  [facility]     Attending Physician Attestation:  Complete documentation of historical and exam elements from today's encounter can be found in the full encounter summary report (not reduplicated in this progress note).  I personally obtained the chief complaint(s) and history of present illness.  I confirmed and edited as necessary the review of systems, past medical/surgical history, family history, social history,  and examination findings as documented by others; and I examined the patient myself.  I personally reviewed the relevant tests, images, and reports as documented above.  I formulated and edited as necessary the assessment and plan and discussed the findings and management plan with the patient and family. Freddy Matthews MD

## 2024-03-22 NOTE — NURSING NOTE
"Chief Complaints and History of Present Illnesses   Patient presents with    Vision Changes Os     Here for vision changes left eye      Chief Complaint(s) and History of Present Illness(es)       Vision Changes Os              Associated symptoms: Negative for eye pain, flashes and floaters    Treatments tried: eye drops    Pain scale: 0/10    Comments: Here for vision changes left eye               Comments    3 days ago, she noticed gray shadows left eye \"all over\"   She tells me its affecting her balance when walking.   Denies eye pain or discomfort.  No flashes or floaters.   She is taking Brimonidine, Dorzolamide and Serum tears    Ulises Ho 11:58 AM March 22, 2024                    "

## 2024-04-19 ENCOUNTER — VIRTUAL VISIT (OUTPATIENT)
Dept: PSYCHIATRY | Facility: CLINIC | Age: 69
End: 2024-04-19
Payer: MEDICARE

## 2024-04-19 DIAGNOSIS — F99 INSOMNIA DUE TO OTHER MENTAL DISORDER: ICD-10-CM

## 2024-04-19 DIAGNOSIS — F41.1 GAD (GENERALIZED ANXIETY DISORDER): ICD-10-CM

## 2024-04-19 DIAGNOSIS — F31.31 BIPOLAR AFFECTIVE DISORDER, CURRENTLY DEPRESSED, MILD (H): Primary | ICD-10-CM

## 2024-04-19 DIAGNOSIS — F51.05 INSOMNIA DUE TO OTHER MENTAL DISORDER: ICD-10-CM

## 2024-04-19 PROCEDURE — 99214 OFFICE O/P EST MOD 30 MIN: CPT | Mod: 95 | Performed by: NURSE PRACTITIONER

## 2024-04-19 RX ORDER — ARIPIPRAZOLE 5 MG/1
5 TABLET ORAL DAILY
Qty: 90 TABLET | Refills: 1 | Status: SHIPPED | OUTPATIENT
Start: 2024-04-19 | End: 2024-07-19

## 2024-04-19 RX ORDER — LAMOTRIGINE 150 MG/1
300 TABLET ORAL AT BEDTIME
Qty: 180 TABLET | Refills: 1 | Status: SHIPPED | OUTPATIENT
Start: 2024-04-19

## 2024-04-19 ASSESSMENT — PATIENT HEALTH QUESTIONNAIRE - PHQ9
SUM OF ALL RESPONSES TO PHQ QUESTIONS 1-9: 4
SUM OF ALL RESPONSES TO PHQ QUESTIONS 1-9: 4

## 2024-04-19 ASSESSMENT — PAIN SCALES - GENERAL: PAINLEVEL: NO PAIN (0)

## 2024-04-19 NOTE — PROGRESS NOTES
Virtual Visit Details    Type of service:  Video Visit   Video Start Time:  10:47 AM  Video End Time: 11:15 AM    Originating Location (pt. Location): Home    Distant Location (provider location):  Off-site  Platform used for Video Visit: [employer]

## 2024-04-19 NOTE — NURSING NOTE
Is the patient currently in the state of [place]? YES    Visit mode:VIDEO    If the visit is dropped, the patient can be reconnected by: VIDEO VISIT:  Send e-mail to at [email or web address]    Will anyone else be joining the visit? No  (If patient encounters technical issues they should call [phone])    How would you like to obtain your AVS? MyChart    Are changes needed to the allergy or medication list? No    Are refills needed on medications prescribed by this physician? NO    Rooming Documentation: Assigned questionnaire(s) completed .    Reason for visit: KATHY Barr

## 2024-04-19 NOTE — PROGRESS NOTES
"         Outpatient Psychiatric Progress Note    Name: Emperatriz Esteban   : 1955                    Primary Care Provider: Nayana Wheat MD   Therapist: Yes    PHQ-9 scores:      10/5/2023    10:04 AM 2023    12:24 PM 3/4/2024    10:52 AM   PHQ-9 SCORE   PHQ-9 Total Score MyChart 2 (Minimal depression) 1 (Minimal depression) 4 (Minimal depression)   PHQ-9 Total Score 2 1 4       VAL-7 scores:      1/10/2023     4:37 PM 2023     1:46 PM 3/4/2024    10:53 AM   VAL-7 SCORE   Total Score  7 (mild anxiety) 15 (severe anxiety)   Total Score 9 7 15       Patient Identification:    Patient is a 69 year old year old, single  White Not  or  female  who presents for return visit with me.  Patient is currently retired. Patient attended the session alone. Patient prefers to be called: \"Eulalia\".    Current medications include:   Current Outpatient Medications   Medication Sig Dispense Refill    ARIPiprazole (ABILIFY) 5 MG tablet Take 1 tablet (5 mg) by mouth daily 90 tablet 1    aspirin 81 MG EC tablet Take 81 mg by mouth daily  90 tablet 3    atorvastatin (LIPITOR) 20 MG tablet Take 1 tablet (20 mg) by mouth daily 90 tablet 0    brimonidine (ALPHAGAN) 0.2 % ophthalmic solution Place 1 drop into both eyes 2 times daily (Patient not taking: Reported on 2024) 30 mL 3    dorzolamide-timolol (COSOPT) 2-0.5 % ophthalmic solution Place 1 drop into both eyes 2 times daily 10 mL 11    hypromellose (GENTEAL SEVERE) ophthalmic gel 0.3% USE once at night in the eye. Both eyes 10 g 11    lamoTRIgine (LAMICTAL) 150 MG tablet Take 1 .5 tabs (225 mg) daily for 14 days then 1 tablet daily      levothyroxine (SYNTHROID/LEVOTHROID) 25 MCG tablet Take 1 tablet (25 mcg) by mouth daily 90 tablet 3    Multiple Minerals-Vitamins (ADVANCED CALCIUM/D/MAGNESIUM) TABS Take 1,500 mg by mouth daily       multivitamin (OCUVITE) TABS Take 1 tablet by mouth daily  30 each 0    omega 3 1000 MG CAPS Take 3 g by mouth " daily  90 capsule     traZODone (DESYREL) 50 MG tablet Take 1-2 tablets ( mg) by mouth nightly as needed for sleep 180 tablet 1     No current facility-administered medications for this visit.        The [place] Prescription Monitoring Program has been reviewed and there are no concerns about diversionary activity for controlled substances at this time.      I was able to review most recent Primary Care Provider, specialty provider, and therapy visit notes that I have access to.     Today, patient reports that when she cut back on lamictal she became more depressed. She did not keep her physical therapist - not a good fit.  Now less depression since increasing her dose of Lamictal.  Anxiety worsens when she thinks about leaving her apartment for fear of falling due to unsteady gait.  She found a therapist.  She is great - same age - experienced.  EMDR therapy.  Every two weeks.       has a past medical history of Bipolar 1 disorder (H).    Social history updates:    She takes her dog out for walks.      Substance use updates:    No alcohol use reported  Tobacco use: No    Vital Signs:   There were no vitals taken for this visit.    Labs:    Most recent laboratory results reviewed and no new labs.     Mental Status Examination:  Appearance: awake, alert and casual dress  Attitude: cooperative  Eye Contact:  adequate  Gait and Station:  Unsteady gait reported and No dizziness or falls  Psychomotor Behavior:   Sitting in a chair  Oriented to:  time, person, and place  Attention Span and Concentration:  Normal  Speech:   vtspeech: clear, coherent and Speaks: English  Mood:  anxious, depressed, and better  Affect:  mood congruent  Associations:  no loose associations  Thought Process:  goal oriented  Thought Content:  no evidence of suicidal ideation or homicidal ideation, no auditory hallucinations present, and no visual hallucinations present  Recent and Remote Memory:  intact Not formally assessed. No  amnesia.  Fund of Knowledge: appropriate  Insight:  good  Judgment: good  Impulse Control:  good    Suicide Risk Assessment:  Today Emperatriz Esteban reports no thoughts to harm themself or others. In addition, there are notable risk factors for self-harm, including single status, anxiety, and mood change. However, risk is mitigated by history of seeking help when needed, future oriented, denies suicidal intent or plan, and denies homicidal ideation, intent, or plan. Therefore, based on all available evidence including the factors cited above, Emperatriz Esteban does not appear to be at imminent risk for self-harm, does not meet criteria for a 72-hr hold, and therefore remains appropriate for ongoing outpatient level of care.  A thorough assessment of risk factors related to suicide and self-harm have been reviewed and are noted above. The patient convincingly denies suicidality on several occasions. Local community safety resources printed and reviewed for patient to use if needed. There was no deceit detected, and the patient presented in a manner that was believable.     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 03/22/2024     Priority: Medium    Bilateral low back pain without sciatica 07/11/2023     Priority: Medium    Other specified glaucoma 05/26/2023     Priority: Medium    Impaired fasting glucose 12/16/2021     Priority: Medium    Prediabetes 09/02/2016     Priority: Medium    Bipolar affective disorder (H) 09/01/2016     Priority: Medium     Follows with psychiatry in [place], [place] (in [place] for a few months)        Other specified hypothyroidism 09/01/2016     Priority: Medium       Assessment:    Emperatriz DONNELL  Eulalia has recently begun seeing a therapist which she finds to be a good fit for her.  When she stopped taking lamotrigine she became suicidal and very depressed.  Now she is back to 300 mg at bedtime she finds that her mood is stable.  Abilify continues at 5 mg daily to also assist with depression and mood regulation.  Trazodone is available as needed for insomnia when it occurs.  She has had no falling episodes since our last visit.    Medication side effects and alternatives were reviewed. Health promotion activities recommended and reviewed today. All questions addressed. Education and counseling completed regarding risks and benefits of medications and psychotherapy options.    Treatment Plan:      1.  Lamotrigine 300 mg at bedtime  2.  Abilify 5 mg daily  3.  Trazodone 50 to 100 mg at bedtime as needed for insomnia    Continue all other medications as reviewed per electronic medical record today.   Safety plan reviewed. To the Emergency Department as needed or call after hours crisis line at [phone] or [phone]. [place] Crisis Text Line. Text [place] to 464948 or Suicide LifeLine Chat: suicidepreventionlifeline.org/chat/  To schedule individual or family therapy, call [facility] at [phone]  Schedule an appointment with me in 3 months or sooner as needed. Call [facility] at [phone] to schedule.  Follow up with primary care provider as planned or for acute medical concerns.  Call the psychiatric nurse line with medication questions or concerns at [phone]  MyChart may be used to communicate with your provider, but this is not intended to be used for emergencies.        Crisis Resources   The EmPath is an adults only unit located at [facility] is a short term (generally less than 23 hour stay) designed for crisis intervention and stabilization. Pts have the opportunity to meet quickly with a behavioral health team for evaluation in a calm and  peaceful therapuetic environment. To be evaluated for admission pts are triaged throught the [facility] ED.      The following hotlines are for both adults and children. The and are open 24 hours a day, 7 days a week unless noted otherwise.        Crisis Lines      Crisis Text Line  Text 573246  You will be connected with a trained live crisis counselor to provide support.        Gambling Hotline  9.087.852.7601 [hope]        línea de crisis española  [phone]        [facility] Helpline  [phone]        National Hope Line  1.450.390.9366 [hope]        National Suicide Prevention Lifeline  Free and confidential support  988 or 1.168.522.TALK [8255]  http://suicidepreventionlifeline.org        [employer] (LGBTQ Youth Crisis Line)  0.822.404.5802  text START to 549-449        's Crisis Line  0.365.123.3436 (Press 1)  or text 456426    [facility] Mental Health Crisis Response  Within [place], call **CRISIS [**502039] to be connected to a mental health professional who can assist you.        [facility] [place] Crisis  [phone]      [facility] Mobile Crisis  [phone]      [facility] Crisis  [phone]      [facility] Mobile Crisis  [phone] (adults)  [phone] (children)      [facility] Mobile Crisis  [phone] (adults)  [phone] (children)      [facility] Mobile Crisis  [phone]      [facility] Mobile Crisis  [phone]    Community Resources      Fast Tracker  Linking people to mental health and substance use disorder resources  fasttrackermn.org        [place] Mental Health Warmline  Peer to peer support  5 pm to 9 am 7 days/week  6.643.238.0068  https://mnwitw.org/lawrence        [place] on Mental Illness (CAN)  814.811.3211 or 1.888.CAN.HELPS  https://namimn.org/        [facility]  [facility] residents only   [address] [place] [address]   [phone]        [facility]  Free mental health counseling  https://walkin.org/  612.870.0565 X2    Mental Health Apps      Calm Harm  https://[employer].co.uk/      My3  https://my3app.org/      Beau Safety Plan  https://www.mysafetyplan.org/   Administrative Billing:   Time spent with patient includes counseling and coordination of care regarding above diagnoses and treatment plan.    Patient Status:  This is a continuous care patient and medications will be prescribed by the psychiatric provider until further indicated.    Signed:   GLORIA Mims-BC   Psychiatry       Answers submitted by the patient for this visit:  Patient Health Questionnaire (Submitted on 4/19/2024)  PHQ9 TOTAL SCORE: 4

## 2024-04-19 NOTE — PATIENT INSTRUCTIONS
"Patient Education   The Panel Psychiatry Program  What to Expect  Here's what to expect in the Panel Psychiatry Program.   About the program  You'll be meeting with a psychiatric doctor to check your mental health. A psychiatric doctor helps you deal with troubling thoughts and feelings by giving you medicine. They'll make sure you know the plan for your care. You may see them for a long time. When you're feeling better, they may refer you back to seeing your family doctor.   If you have any questions, we'll be glad to talk to you.  About visits  Be open  At your visits, please talk openly about your problems. It may feel hard, but it's the best way for us to help you.  Cancelling visits  If you can't come to your visit, please call us right away at [phone]. If you don't cancel at least 24 hours (1 full day) before your visit, that's \"late cancellation.\"  Not showing up for your visits  Being very late is the same as not showing up. You'll be a \"no show\" if:  You're more than 15 minutes late for a 30-minute (half hour) visit.  You're more than 30 minutes late for a 60-minute (full hour) visit.  If you cancel late or don't show up 2 times within 6 months, we may end your care.  Getting help between visits  If you need help between visits, you can call us Monday to Friday from 8 a.m. to 4:30 p.m. at [phone].  Emergency care  Call 911 or go to the nearest emergency department if your life or someone else's life is in danger.  Call 988 anytime to reach the national Suicide and Crisis hotline.  Medicine refills  To refill your medicine, call your pharmacy. You can also call [facility]'s Behavioral Access at [phone], Monday to Friday, 8 a.m. to 4:30 p.m. It can take 1 to 3 business days to get a refill.   Forms, letters, and tests  You may have papers to fill out, like FMLA, short-term disability, and workability. We can help you with these forms at your visits, but you must have an " appointment. You may need more than 1 visit for this, to be in an intensive therapy program, or both.  Before we can give you medicine for ADHD, we may refer you to get tested for it or confirm it another way.  We may not be able to give you an emotional support animal letter.  We don't do mental health checks ordered by the court.   We don't do mental health testing, but we can refer you to get tested.   Thank you for choosing us for your care.  For informational purposes only. Not to replace the advice of your health care provider. Copyright   2022 [employer]. All rights reserved. IT MOVES IT 044478 - 12/22.       Treatment Plan:      1.  Lamotrigine 300 mg at bedtime  2.  Abilify 5 mg daily  3.  Trazodone 50 to 100 mg at bedtime as needed for insomnia    Continue all other medical directions per primary care provider.   Continue all other medications as reviewed per electronic medical record today.   Safety plan reviewed. To the Emergency Department as needed or call after hours crisis line at [phone] or [phone]. [place] Crisis Text Line: Text [place] to 460472  or  Suicide LifeLine Chat: suicidepreventionlifeline.org/chat/  To schedule individual or family therapy, call [facility] at [phone].   Schedule an appointment with me in 3 months or sooner as needed.  Call [facility] at [phone] to schedule.  Follow up with primary care provider as planned or for acute medical concerns.  Call the psychiatric nurse line with medication questions or concerns at [phone].  Rayneerhart may be used to communicate with your provider, but this is not intended to be used for emergencies.        Crisis Resources   The EmPath is an adults only unit located at [facility] in [place] is a short term (generally less than 23 hour stay) designed for crisis intervention and stabilization. Pts have the opportunity to meet quickly with a behavioral health team for  evaluation in a calm and peaceful therapuetic environment. To be evaluated for admission pts are triaged throught the [facility] ED.      The following hotlines are for both adults and children. The and are open 24 hours a day, 7 days a week unless noted otherwise.        Crisis Lines      Crisis Text Line  Text 977580  You will be connected with a trained live crisis counselor to provide support.        Gambling Hotline  7.113.026.7893 [hope]        línea de crisis española  111.790.4028        [place] [employer] Helpline  544.243.4978        National Hope Line  9.017.671.4048 [hope]        National Suicide Prevention Lifeline  Free and confidential support  988 or 1.907.947.TALK [8255]  http://suicidepreventionlifeline.org        [employer] (LGBTQ Youth Crisis Line)  9.311.097.8445  text START to 643-092        's Crisis Line  6.134.950.0602 (Press 1)  or text 500086    [facility] [place] Mental Health Crisis Response  Within [place], call **CRISIS [**857660] to be connected to a mental health professional who can assist you.        [facility] Crisis  [phone]      [facility] Mobile Crisis  [phone]      [facility] Crisis  [phone]      [facility] Mobile Crisis  [phone] (adults)  [phone] (children)      [facility] Mobile Crisis  [phone] (adults)  [phone] (children)      [facility] Mobile Crisis  [phone]      [facility] Mobile Crisis  [phone]    Community Resources      Fast Tracker  Linking people to mental health and substance use disorder resources  fasttrackermn.org        [place] Mental Health Warmline  Peer to peer support  5 pm to 9 am 7 days/week  8.619.244.5477  https://mnwitw.org/lawrence        National [place] on Mental Illness (CAN)  897.336.1210 or 1.888.CAN.HELPS  https://namimn.org/        [facility]  [facility] residents [address]  [facility]  [phone]        [facility]  Free mental health counseling  https://walkin.org/  [phone] X2    Mental Health Apps      Calm Harm  https://calmharm.co.uk/      My3  https://my3app.org/      Beau Safety Plan  https://www.mysafetyplan.org/

## 2024-05-04 ENCOUNTER — HEALTH MAINTENANCE LETTER (OUTPATIENT)
Age: 69
End: 2024-05-04

## 2024-05-08 ENCOUNTER — OFFICE VISIT (OUTPATIENT)
Dept: OPHTHALMOLOGY | Facility: CLINIC | Age: 69
End: 2024-05-08
Attending: OPHTHALMOLOGY
Payer: MEDICARE

## 2024-05-08 DIAGNOSIS — H04.123 DRY EYE SYNDROME OF BOTH EYES: Primary | ICD-10-CM

## 2024-05-08 DIAGNOSIS — H04.123 DRY EYE SYNDROME OF BOTH EYES: ICD-10-CM

## 2024-05-08 DIAGNOSIS — H40.1130 PRIMARY OPEN ANGLE GLAUCOMA OF BOTH EYES, UNSPECIFIED GLAUCOMA STAGE: Primary | ICD-10-CM

## 2024-05-08 DIAGNOSIS — H40.003 GLAUCOMA SUSPECT OF BOTH EYES: Primary | ICD-10-CM

## 2024-05-08 DIAGNOSIS — H16.213 EXPOSURE KERATOCONJUNCTIVITIS OF BOTH EYES: ICD-10-CM

## 2024-05-08 DIAGNOSIS — H44.2B3 BOTH EYES AFFECTED BY DEGENERATIVE MYOPIA WITH MACULAR HOLE: ICD-10-CM

## 2024-05-08 DIAGNOSIS — H52.13 HIGH MYOPIA, BILATERAL: ICD-10-CM

## 2024-05-08 DIAGNOSIS — H40.1130 PRIMARY OPEN ANGLE GLAUCOMA OF BOTH EYES, UNSPECIFIED GLAUCOMA STAGE: ICD-10-CM

## 2024-05-08 DIAGNOSIS — Z96.1 PSEUDOPHAKIA: ICD-10-CM

## 2024-05-08 PROCEDURE — G0463 HOSPITAL OUTPT CLINIC VISIT: HCPCS | Mod: 25,27 | Performed by: OPHTHALMOLOGY

## 2024-05-08 PROCEDURE — 99214 OFFICE O/P EST MOD 30 MIN: CPT | Mod: 25 | Performed by: OPHTHALMOLOGY

## 2024-05-08 PROCEDURE — 68761 CLOSE TEAR DUCT OPENING: CPT | Performed by: OPHTHALMOLOGY

## 2024-05-08 PROCEDURE — 92083 EXTENDED VISUAL FIELD XM: CPT | Performed by: OPHTHALMOLOGY

## 2024-05-08 PROCEDURE — G0463 HOSPITAL OUTPT CLINIC VISIT: HCPCS | Mod: 25 | Performed by: OPHTHALMOLOGY

## 2024-05-08 PROCEDURE — 99213 OFFICE O/P EST LOW 20 MIN: CPT | Performed by: OPHTHALMOLOGY

## 2024-05-08 ASSESSMENT — CONF VISUAL FIELD
METHOD: COUNTING FINGERS
OD_INFERIOR_NASAL_RESTRICTION: 0
OS_SUPERIOR_TEMPORAL_RESTRICTION: 0
OS_INFERIOR_TEMPORAL_RESTRICTION: 0
OD_SUPERIOR_NASAL_RESTRICTION: 0
OD_NORMAL: 1
OS_INFERIOR_NASAL_RESTRICTION: 0
OS_NORMAL: 1
OD_INFERIOR_TEMPORAL_RESTRICTION: 0
OD_SUPERIOR_TEMPORAL_RESTRICTION: 0
OD_SUPERIOR_NASAL_RESTRICTION: 0
OS_SUPERIOR_NASAL_RESTRICTION: 0
OD_SUPERIOR_TEMPORAL_RESTRICTION: 0
OD_INFERIOR_TEMPORAL_RESTRICTION: 0
OS_SUPERIOR_NASAL_RESTRICTION: 0
OD_INFERIOR_NASAL_RESTRICTION: 0
OS_INFERIOR_TEMPORAL_RESTRICTION: 0
OS_NORMAL: 1
OD_NORMAL: 1
OS_INFERIOR_NASAL_RESTRICTION: 0
OS_SUPERIOR_TEMPORAL_RESTRICTION: 0
METHOD: COUNTING FINGERS

## 2024-05-08 ASSESSMENT — TONOMETRY
OD_IOP_MMHG: 17
OD_IOP_MMHG: 17
IOP_METHOD: APPLANATION
OS_IOP_MMHG: 18
IOP_METHOD: APPLANATION
OS_IOP_MMHG: 18

## 2024-05-08 ASSESSMENT — REFRACTION_WEARINGRX
SPECS_TYPE: PAL
OS_ADD: +2.50
OS_CYLINDER: +1.50
OD_AXIS: 169
OS_ADD: +2.50
OD_CYLINDER: +1.00
OD_ADD: +2.50
SPECS_TYPE: PAL
OD_AXIS: 169
OS_SPHERE: -6.25
OS_AXIS: 150
OS_CYLINDER: +1.50
OD_SPHERE: -4.50
OS_AXIS: 150
OS_SPHERE: -6.25
OD_SPHERE: -4.50
OD_CYLINDER: +1.00
OD_ADD: +2.50

## 2024-05-08 ASSESSMENT — VISUAL ACUITY
OD_CC+: -3
METHOD: SNELLEN - LINEAR
OD_CC+: -3
OS_CC: 20/40
OS_CC: 20/40
OD_CC: 20/20
OD_CC: 20/20
METHOD: SNELLEN - LINEAR
CORRECTION_TYPE: GLASSES

## 2024-05-08 ASSESSMENT — SLIT LAMP EXAM - LIDS
COMMENTS: MGD
COMMENTS: MGD

## 2024-05-08 ASSESSMENT — EXTERNAL EXAM - LEFT EYE
OS_EXAM: NORMAL
OS_EXAM: NORMAL

## 2024-05-08 ASSESSMENT — EXTERNAL EXAM - RIGHT EYE
OD_EXAM: NORMAL
OD_EXAM: NORMAL

## 2024-05-08 NOTE — PROGRESS NOTES
HPI       Glaucoma Follow-Up    In both eyes.  Associated symptoms include Negative for flashes and floaters.  Treatment compliance is always.  Pain was noted as 0/10.             Comments    The patient states her dry eyes are  improving.    She uses the Serum Tears 3 to 5 times a day, Cosopt twice daily and Brimonidine twice daily in both eyes.  She is not using artificial tear ointment at this time.    She is concerned about long time shadows in the left eye that seem to be worsening.  She had to cancel her up coming retina appointment due to a death in family.  Shivani Araujo, COA, COA 12:57 PM 05/08/2024            Last edited by Shivani Araujo COA on 5/8/2024 12:57 PM.          Review of systems for the eyes was negative other than the pertinent positives/negatives listed in the HPI.      Assessment & Plan      Emperatriz Esteban is a 69 year old female with the following diagnoses:   1. Glaucoma suspect of both eyes    2. High myopia, bilateral    3. Pseudophakia - Both Eyes    4. 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 Dry eye syndrome of both eyes       Here for glaucoma recheck   Followed in [place], [place] for many years (records reviewed)  Started drops 2013.  Many PGs changed due to cost/effect.  Artemigan worked best.  Vyzulta too expensive.    S/P Selected laser trabeculoplasty (SLT) left eye 10/2019, right eye 11/2019     (+) family history - father  Gonio: open 360 to CBB both eyes on previous exam (unable to tolerate today)  K pachy:  539/570  Asthma/COPD: No  Steroid Use: No    Kidney Stones: No    Sulfa Allergy:      No   Tmax: unknown  Hx of vitrectomy for floaters left eye     IOP today 17/18  Variability in previous VF and RNFL testing due to anxiety.    OCT with limited quality scans secondary to high myopia/dense peripapillary atrophy.    LVC visual field today with better reliabilty.  Improved defects right eye > left eye      Intraocular pressure acceptable for  now  Goal teens both eyes   Continue brimonidine and Cosopt in each eye      Patient disposition:   Return in about 6 months (around 11/8/2024) for VT only, LVC VF.           Attending Physician Attestation:  Complete documentation of historical and exam elements from today's encounter can be found in the full encounter summary report (not reduplicated in this progress note).  I personally obtained the chief complaint(s) and history of present illness.  I confirmed and edited as necessary the review of systems, past medical/surgical history, family history, social history, and examination findings as documented by others; and I examined the patient myself.  I personally reviewed the relevant tests, images, and reports as documented above.  I formulated and edited as necessary the assessment and plan and discussed the findings and management plan with the patient and family. . - Lul Mirza MD

## 2024-05-08 NOTE — NURSING NOTE
Chief Complaints and History of Present Illnesses   Patient presents with    Follow Up     Follow up for dry eyes.     Chief Complaint(s) and History of Present Illness(es)       Follow Up              Laterality: both eyes    Associated symptoms: Negative for flashes and floaters    Treatments tried: eye drops    Pain scale: 0/10    Comments: Follow up for dry eyes.              Comments    The patient states her dry eyes are  improving.    She uses the Serum Tears 3 to 5 times a day, Cosopt twice daily and Brimonidine twice daily in both eyes.  She is not using artificial tear ointment at this time.    She is concerned about long time shadows in the left eye that seem to be worsening.  She had to cancel her up coming retina appointment due to a death in family.  Shivani Araujo, COA, COA 12:56 PM 05/08/2024

## 2024-05-08 NOTE — PROGRESS NOTES
CC: Dry Eye each eye     Referring Provider: Dr ANTHONY Montes    HPI:  Emperatriz Esteban is a  68 year old year-old patient with history of glaucoma and cataract surgery.  Moved from [place] May 2021 and she is here to establish eye care. Reports chronic progressive decreased vision both eyes.  She is referred to us from Dr. Montes for evaluation of her dry eyes.  She has not been doing eyelid hygiene.  She has used serum tears in the past and found them helpful.  She has not used them since 2021.  She uses Refresh Tremayne intermittently.  She has had punctal plugs in the past that were dissolvable which were helpful.     Interval hx 05/08/2024  Chief Complaint(s) and History of Present Illness(es)       Follow Up    In both eyes.  Associated symptoms include Negative for flashes and floaters.  Treatments tried include eye drops.  Pain was noted as 0/10. Additional comments: Follow up for dry eyes.             Comments    The patient states her dry eyes are  improving.    She uses the Serum Tears 3 to 5 times a day, Cosopt twice daily and Brimonidine twice daily in both eyes.  She is not using artificial tear ointment at this time.    She is concerned about long time shadows in the left eye that seem to be worsening.  She had to cancel her up coming retina appointment due to a death in family.  Shivani Araujo, COA, COA 12:56 PM 05/08/2024                     POHx:  History of myopia age 6 with CL   1997 cataract surgery both eyes   In the past 10 ys diagnosed with glaucoma: cosopt twice a day both eyes and brimonidine twice a day left eye   2016 diagnosis of myopic degeneration - [place] by Henok [place] ([place]) (glaucoma). 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   - Patient states serum tears have worked well for her.   Tried ointment and did not like it  Tried cover at night and did not like it  Could not afford restasis    Plan  Discussed R/B/A of punctal plugs,including over tearing  patient agree to proceed with lower duct of both eye using colalgen plugs  -continue serum tears 6 times a day   - omega-3 2000mg po daily  - Discussed eyelid hygiene measures  - Start AT gel drop at night and during the day    Informed the patient that if symptoms persist or worsen she can consider eyelid sx    # history of myopic degeneration  - Shallow posterior staphylomas both eyes  - Retina detachment precautions were discussed with the patient (presence or increased in flashes, floaters or a curtain in the visual field) and was asked to return if any of the those occur     # Primary open angle glaucoma (POAG) both eyes    Advance left eye.   Last gonio: open 360 to CB both eyes   Last pachy:  539/570  Asthma/COPD: No  Steroid Use: No Kidney Stones: No Sulfa Allergy:      No   Tmax: unknown  Today IOP stable  Following with Dr. Montes     # partial thickness Macula hole right eye   Stable - observe     # pseudophakic both eyes   Stable     # history of vitrectomy left eye   By [facility]. [facility]ok Bolton in [facility]    Follow up Cornea: 1 yearw/ V,T at next visit, call if problems sooner to clinic.      Esther Puentes MD  Resident ophthalmology    ALSO SEES:  Dr Pola Mirza (glaucoma)    Attending Physician Attestation: Complete documentation of  historical and exam elements from today's encounter can be found in the full encounter summary report (not reduplicated in this progress note). I personally obtained the chief complaint(s) and history of present illness. I confirmed and edited as necessary the review of systems, past medical/surgical history, family history, social history, and examination findings as documented by others; and I examined the patient myself. I personally reviewed the relevant tests, images, and reports as documented above. I formulated and edited as necessary the assessment and plan and discussed the findings and management plan with the patient and family.  I was present for the entire procedure(s). - Jose Ryder M.D

## 2024-05-20 DIAGNOSIS — H40.1130 PRIMARY OPEN ANGLE GLAUCOMA OF BOTH EYES, UNSPECIFIED GLAUCOMA STAGE: Primary | ICD-10-CM

## 2024-05-20 RX ORDER — BRIMONIDINE TARTRATE 2 MG/ML
1 SOLUTION/ DROPS OPHTHALMIC 2 TIMES DAILY
Qty: 10 ML | Refills: 5 | Status: SHIPPED | OUTPATIENT
Start: 2024-05-20 | End: 2024-06-17

## 2024-05-23 ENCOUNTER — OFFICE VISIT (OUTPATIENT)
Dept: OPHTHALMOLOGY | Facility: CLINIC | Age: 69
End: 2024-05-23
Payer: MEDICARE

## 2024-05-23 DIAGNOSIS — H35.371 EPIRETINAL MEMBRANE (ERM) OF RIGHT EYE: Primary | ICD-10-CM

## 2024-05-23 PROCEDURE — 92134 CPTRZ OPH DX IMG PST SGM RTA: CPT

## 2024-05-23 PROCEDURE — 99214 OFFICE O/P EST MOD 30 MIN: CPT

## 2024-05-23 PROCEDURE — G0463 HOSPITAL OUTPT CLINIC VISIT: HCPCS

## 2024-05-23 ASSESSMENT — EXTERNAL EXAM - LEFT EYE: OS_EXAM: NORMAL

## 2024-05-23 ASSESSMENT — VISUAL ACUITY
METHOD: SNELLEN - LINEAR
OD_CC+: -2
OD_CC: 20/20
OS_CC: 20/40

## 2024-05-23 ASSESSMENT — CONF VISUAL FIELD
OD_INFERIOR_TEMPORAL_RESTRICTION: 0
OS_INFERIOR_TEMPORAL_RESTRICTION: 0
OS_SUPERIOR_TEMPORAL_RESTRICTION: 0
OD_NORMAL: 1
OS_INFERIOR_NASAL_RESTRICTION: 0
OS_SUPERIOR_NASAL_RESTRICTION: 0
OS_NORMAL: 1
OD_SUPERIOR_NASAL_RESTRICTION: 0
OD_INFERIOR_NASAL_RESTRICTION: 0
OD_SUPERIOR_TEMPORAL_RESTRICTION: 0

## 2024-05-23 ASSESSMENT — CUP TO DISC RATIO
OD_RATIO: 0.3
OS_RATIO: 0.4

## 2024-05-23 ASSESSMENT — EXTERNAL EXAM - RIGHT EYE: OD_EXAM: NORMAL

## 2024-05-23 ASSESSMENT — TONOMETRY
OD_IOP_MMHG: 13
OS_IOP_MMHG: 13
IOP_METHOD: TONOPEN

## 2024-05-23 ASSESSMENT — SLIT LAMP EXAM - LIDS
COMMENTS: MGD
COMMENTS: MGD

## 2024-05-23 NOTE — PROGRESS NOTES
CC: blurred vision OS    HPI: Ms Hassan had a hx of traumatic fall 5 months ago ( patient fell on face and sustained brain bleed). Started PT to help with balance    PMHx:   DL   Hx stroke (TIA) was on daily aspirin however stopped after the brain bleed  Hx of brain bleed 2/24    Ct head 3/4/2024:   1. Interval resolution of left frontal subarachnoid hemorrhage with no new intracranial pathology.   2. Stable left orbital roof and frontal bone fracture with associated left frontal scalp hematoma.     Imaging:  OCT: 05/23/2024  Right eye: Epiretinal membrane with lamellar hole stable; subfoveal IS/OS disruption  Left eye: thin retina with mild irregularity of the retina surface, no IRF/SRF    Optos photos: 3.22.24  C/w clinical exam     Retina Laser procedures:  Right eye PRP   Left eye PRP    Intravitreal injections:  none    Assessment/ Plan: 05/23/2024       # Recent Head trauma 2/2024  # Left frontal subarachnoid hemorrhage  # Orbital roof and frontal bone fracture, LEFT   - The patient has a hx of falling because of gait imbalance/instability and she thinks it is multifactorial in nature, she believes that both her eye and ears are causing her instability, she is not able to appreciate depth and has difficulty with stairs.  - She has a Hx of stroke and was chronically maintained on ASA however this was stopped after her injury and she is currently still off the ASA.  - Repeat CT shows improvement in the brain bleed. Her orbital fracture is also stable.  - fundus exam did not reveal any retinal tears or breaks, no VH or inflammation.  - Fundus photo and OCT repeated today did not show any significant changes compares to previous imaging done.  - RD warning signs also explained to patient   - Patient knows to come in if any alarming symptoms arise  - Follow up with Dr Montes as planned.    # Myopic degeneration  - Shallow posterior staphylomas both eyes  - No retinal holes or breaks seen on fundus exam today  - RD  warning signs explained   - patient knows to come in if any change in symptoms    # Lamellar hole right eye   -Stable compared to previous scans  - observe     # Primary open angle glaucoma (POAG) both eyes    Managed with topical drops    # history of Dry eye syndrome   - no complaints on exam today  - requested refill pf serum tears prescription: note sent to Dr Ryder  - Follows with Dr. Ryder      # Pseudophakic both eyes   Stable     # history of vitrectomy left eye   - floaterectomy  [facility]. Henok Bolton [facility]    - Follow up with Dr Montes as planned.    Freddy Matthews MD     Medical Retina  [facility]     Attending Physician Attestation:  Complete documentation of historical and exam elements from today's encounter can be found in the full encounter summary report (not reduplicated in this progress note).  I personally obtained the chief complaint(s) and history of present illness.  I confirmed and edited as necessary the review of systems, past medical/surgical history, family history, social history, and examination findings as documented by others; and I examined the patient myself.  I personally reviewed the relevant tests, images, and reports as documented above.  I formulated and edited as necessary the assessment and plan and discussed the findings and management plan with the patient and family. Freddy Matthews MD

## 2024-05-23 NOTE — NURSING NOTE
Follow up - patient states she still feels unstable going up and down on a curb or walking in grass - maybe a bit worse since last visit with Dr. Pola Matthews. Has been using glaucoma medications as well as serum tears but admits serum tears are difficult to use the proper amount of times daily if she's out of the house. No vision changes, no pain or discomfort.     Noelle Licea  2:46 PM

## 2024-05-29 ENCOUNTER — OFFICE VISIT (OUTPATIENT)
Dept: URGENT CARE | Facility: URGENT CARE | Age: 69
End: 2024-05-29
Payer: MEDICARE

## 2024-05-29 ENCOUNTER — ANCILLARY PROCEDURE (OUTPATIENT)
Dept: GENERAL RADIOLOGY | Facility: CLINIC | Age: 69
End: 2024-05-29
Attending: INTERNAL MEDICINE
Payer: MEDICARE

## 2024-05-29 VITALS
DIASTOLIC BLOOD PRESSURE: 66 MMHG | WEIGHT: 170 LBS | HEART RATE: 72 BPM | TEMPERATURE: 97.3 F | BODY MASS INDEX: 27.32 KG/M2 | SYSTOLIC BLOOD PRESSURE: 108 MMHG | OXYGEN SATURATION: 98 % | HEIGHT: 66 IN | RESPIRATION RATE: 16 BRPM

## 2024-05-29 DIAGNOSIS — J20.8 ACUTE VIRAL BRONCHITIS: ICD-10-CM

## 2024-05-29 DIAGNOSIS — R06.2 WHEEZING: ICD-10-CM

## 2024-05-29 DIAGNOSIS — J20.8 ACUTE VIRAL BRONCHITIS: Primary | ICD-10-CM

## 2024-05-29 PROCEDURE — 71046 X-RAY EXAM CHEST 2 VIEWS: CPT | Mod: TC | Performed by: RADIOLOGY

## 2024-05-29 PROCEDURE — 99214 OFFICE O/P EST MOD 30 MIN: CPT | Performed by: INTERNAL MEDICINE

## 2024-05-29 RX ORDER — BENZONATATE 200 MG/1
200 CAPSULE ORAL 3 TIMES DAILY PRN
Qty: 30 CAPSULE | Refills: 0 | Status: SHIPPED | OUTPATIENT
Start: 2024-05-29

## 2024-05-29 RX ORDER — CODEINE PHOSPHATE AND GUAIFENESIN 10; 100 MG/5ML; MG/5ML
1-2 SOLUTION ORAL
Qty: 120 ML | Refills: 0 | Status: SHIPPED | OUTPATIENT
Start: 2024-05-29 | End: 2024-07-19

## 2024-05-29 RX ORDER — ALBUTEROL SULFATE 90 UG/1
2 AEROSOL, METERED RESPIRATORY (INHALATION) EVERY 6 HOURS PRN
Qty: 18 G | Refills: 0 | Status: SHIPPED | OUTPATIENT
Start: 2024-05-29

## 2024-05-29 ASSESSMENT — ENCOUNTER SYMPTOMS
SORE THROAT: 1
SINUS PAIN: 0
COUGH: 1
HEADACHES: 1
RHINORRHEA: 1
MYALGIAS: 0
DIARRHEA: 0
VOMITING: 0
WHEEZING: 1
CHILLS: 0
FEVER: 0

## 2024-05-29 NOTE — PROGRESS NOTES
"ASSESSMENT AND PLAN:      ICD-10-CM    1. Acute viral bronchitis  J20.8 albuterol (PROAIR HFA/PROVENTIL HFA/VENTOLIN HFA) 108 (90 Base) MCG/ACT inhaler     XR Chest 2 Views     benzonatate (TESSALON) 200 MG capsule     guaiFENesin-codeine (ROBITUSSIN AC) 100-10 MG/5ML solution      2. Wheezing  R06.2 albuterol (PROAIR HFA/PROVENTIL HFA/VENTOLIN HFA) 108 (90 Base) MCG/ACT inhaler     XR Chest 2 Views     benzonatate (TESSALON) 200 MG capsule        Patient Instructions   Fluids  Tessalon perles  Albuterol inhaler.  Robitussin codeine at bedtime    chest x-ray - no pneumonia    Prednisone not given due to glaucoma  May finish zpak, will not help if virus    Recheck at your physical next week  Return in about 1 week (around 6/5/2024), or if symptoms worsen or fail to improve.        Rosmery Jerome MD  [facility] URGENT [facility]ARE    Subjective     Emperatrizambrose Esteban is a 69 year old who presents for Patient presents with:  Urgent Care: Bad coughing since last Friday, Given zpack through [email or web address] but not helping.     an established patient of [facility].    URI Adult    Onset of symptoms was 5 day(s) ago.    Current and Associated symptoms: coughing fits  Treatment measures tried include treatment zpak -  did not help.  Predisposing factors include ill contact: none.  Covid negative tests x 2    Review of Systems   Constitutional:  Negative for chills and fever.   HENT:  Positive for rhinorrhea and sore throat (sometimes). Negative for sinus pain. Ear pain: off & on.   Respiratory:  Positive for cough and wheezing. Shortness of breath: little.   Gastrointestinal:  Negative for diarrhea and vomiting.   Musculoskeletal:  Negative for myalgias.   Neurological:  Positive for headaches.           Objective    /66   Pulse 72   Temp 97.3  F (36.3  C) (Temporal)   Resp 16   Ht 1.676 m (5' 6\")   Wt 77.1 kg (170 lb)   SpO2 98%   BMI 27.44 kg/m    Physical Exam  Vitals reviewed.   Constitutional:       " [facility] nurse was notified.  Will call back when it's closer to June to schedule patient for one year Appearance: Normal appearance. She is ill-appearing.   HENT:      Right Ear: Tympanic membrane normal.      Left Ear: Tympanic membrane normal.      Nose: Nose normal.      Mouth/Throat:      Mouth: Mucous membranes are moist.      Pharynx: Oropharynx is clear.   Cardiovascular:      Rate and Rhythm: Normal rate and regular rhythm.   Pulmonary:      Effort: Pulmonary effort is normal.      Breath sounds: Normal breath sounds.      Comments: Deep coughing fits  Neurological:      Mental Status: She is alert.            CXR - Reviewed and interpreted by me Normal- no infiltrates, effusions, pneumothoraces, cardiomegaly or masses

## 2024-05-29 NOTE — PATIENT INSTRUCTIONS
Fluids  Tessalon perles  Albuterol inhaler.  Robitussin codeine at bedtime    chest x-ray - no pneumonia    Prednisone not given due to glaucoma  May finish zpak, will not help if virus    Recheck at your physical next week

## 2024-06-03 ENCOUNTER — TRANSFERRED RECORDS (OUTPATIENT)
Dept: HEALTH INFORMATION MANAGEMENT | Facility: CLINIC | Age: 69
End: 2024-06-03

## 2024-06-03 ENCOUNTER — MEDICAL CORRESPONDENCE (OUTPATIENT)
Dept: HEALTH INFORMATION MANAGEMENT | Facility: CLINIC | Age: 69
End: 2024-06-03
Payer: MEDICARE

## 2024-06-05 ENCOUNTER — TRANSCRIBE ORDERS (OUTPATIENT)
Dept: CALL CENTER | Age: 69
End: 2024-06-05
Payer: MEDICARE

## 2024-06-05 DIAGNOSIS — Z12.83 SCREENING FOR SKIN CANCER: Primary | ICD-10-CM

## 2024-06-05 DIAGNOSIS — L98.9 SKIN ABNORMALITIES: ICD-10-CM

## 2024-07-02 ENCOUNTER — TRANSFERRED RECORDS (OUTPATIENT)
Dept: HEALTH INFORMATION MANAGEMENT | Facility: CLINIC | Age: 69
End: 2024-07-02
Payer: MEDICARE

## 2024-07-11 ENCOUNTER — MEDICAL CORRESPONDENCE (OUTPATIENT)
Dept: HEALTH INFORMATION MANAGEMENT | Facility: CLINIC | Age: 69
End: 2024-07-11
Payer: MEDICARE

## 2024-07-12 ENCOUNTER — TRANSCRIBE ORDERS (OUTPATIENT)
Dept: OTHER | Age: 69
End: 2024-07-12

## 2024-07-12 DIAGNOSIS — R25.1 TREMOR: Primary | ICD-10-CM

## 2024-07-12 DIAGNOSIS — G57.90 LOWER EXTREMITY NEUROPATHY: ICD-10-CM

## 2024-07-13 ENCOUNTER — HEALTH MAINTENANCE LETTER (OUTPATIENT)
Age: 69
End: 2024-07-13

## 2024-07-16 ENCOUNTER — MEDICAL CORRESPONDENCE (OUTPATIENT)
Dept: HEALTH INFORMATION MANAGEMENT | Facility: CLINIC | Age: 69
End: 2024-07-16
Payer: MEDICARE

## 2024-07-19 ENCOUNTER — VIRTUAL VISIT (OUTPATIENT)
Dept: PSYCHIATRY | Facility: CLINIC | Age: 69
End: 2024-07-19
Payer: MEDICARE

## 2024-07-19 DIAGNOSIS — F99 INSOMNIA DUE TO OTHER MENTAL DISORDER: ICD-10-CM

## 2024-07-19 DIAGNOSIS — F31.31 BIPOLAR AFFECTIVE DISORDER, CURRENTLY DEPRESSED, MILD (H): Primary | ICD-10-CM

## 2024-07-19 DIAGNOSIS — F41.1 GAD (GENERALIZED ANXIETY DISORDER): ICD-10-CM

## 2024-07-19 DIAGNOSIS — F51.05 INSOMNIA DUE TO OTHER MENTAL DISORDER: ICD-10-CM

## 2024-07-19 PROCEDURE — 99214 OFFICE O/P EST MOD 30 MIN: CPT | Mod: 95 | Performed by: NURSE PRACTITIONER

## 2024-07-19 PROCEDURE — G2211 COMPLEX E/M VISIT ADD ON: HCPCS | Mod: 95 | Performed by: NURSE PRACTITIONER

## 2024-07-19 RX ORDER — TRAZODONE HYDROCHLORIDE 50 MG/1
50-100 TABLET, FILM COATED ORAL
Qty: 180 TABLET | Refills: 1 | Status: SHIPPED | OUTPATIENT
Start: 2024-07-19

## 2024-07-19 RX ORDER — GABAPENTIN 100 MG/1
100 CAPSULE ORAL PRN
COMMUNITY
Start: 2024-07-02 | End: 2024-08-22

## 2024-07-19 RX ORDER — ARIPIPRAZOLE 5 MG/1
5 TABLET ORAL DAILY
Qty: 90 TABLET | Refills: 1 | Status: SHIPPED | OUTPATIENT
Start: 2024-07-19

## 2024-07-19 RX ORDER — BUPROPION HYDROCHLORIDE 100 MG/1
100 TABLET, EXTENDED RELEASE ORAL EVERY MORNING
Qty: 90 TABLET | Refills: 0 | Status: SHIPPED | OUTPATIENT
Start: 2024-07-19 | End: 2024-08-22

## 2024-07-19 ASSESSMENT — PATIENT HEALTH QUESTIONNAIRE - PHQ9
SUM OF ALL RESPONSES TO PHQ QUESTIONS 1-9: 1
10. IF YOU CHECKED OFF ANY PROBLEMS, HOW DIFFICULT HAVE THESE PROBLEMS MADE IT FOR YOU TO DO YOUR WORK, TAKE CARE OF THINGS AT HOME, OR GET ALONG WITH OTHER PEOPLE: SOMEWHAT DIFFICULT
SUM OF ALL RESPONSES TO PHQ QUESTIONS 1-9: 1

## 2024-07-19 ASSESSMENT — PAIN SCALES - GENERAL: PAINLEVEL: SEVERE PAIN (7)

## 2024-07-19 NOTE — PROGRESS NOTES
"Virtual Visit Details    Type of service:  Video Visit   Video Start Time: 11:16 AM  Video End Time: 11:45 AM    Originating Location (pt. Location): Home    Distant Location (provider location):  Off-site  Platform used for Video Visit: [facility]         Outpatient Psychiatric Progress Note    Name: Emperatriz Esteban   : 1955                    Primary Care Provider: Nayana Wheat MD   Therapist: Yes    PHQ-9 scores:      2023    12:24 PM 3/4/2024    10:52 AM 2024    10:28 AM   PHQ-9 SCORE   PHQ-9 Total Score MyChart 1 (Minimal depression) 4 (Minimal depression) 4 (Minimal depression)   PHQ-9 Total Score 1 4 4       VAL-7 scores:      1/10/2023     4:37 PM 2023     1:46 PM 3/4/2024    10:53 AM   VAL-7 SCORE   Total Score  7 (mild anxiety) 15 (severe anxiety)   Total Score 9 7 15       Patient Identification:    Patient is a 69 year old year old, single  White Not  or  female  who presents for return visit with me.  Patient is currently unemployed. Patient attended the session with wally Nolen , who they agreed to have interview with. Patient prefers to be called: \"Eulalia\".    Current medications include:   Current Outpatient Medications   Medication Sig Dispense Refill    albuterol (PROAIR HFA/PROVENTIL HFA/VENTOLIN HFA) 108 (90 Base) MCG/ACT inhaler Inhale 2 puffs into the lungs every 6 hours as needed for wheezing or cough 18 g 0    ARIPiprazole (ABILIFY) 5 MG tablet Take 1 tablet (5 mg) by mouth daily 90 tablet 1    aspirin 81 MG EC tablet Take 81 mg by mouth daily  90 tablet 3    atorvastatin (LIPITOR) 20 MG tablet Take 1 tablet (20 mg) by mouth daily 90 tablet 0    benzonatate (TESSALON) 200 MG capsule Take 1 capsule (200 mg) by mouth 3 times daily as needed for cough 30 capsule 0    brimonidine (ALPHAGAN) 0.2 % ophthalmic solution Place 1 drop into both eyes 2 times daily 10 mL 11    dorzolamide-timolol (COSOPT) 2-0.5 % ophthalmic solution Place 1 drop into both " eyes 2 times daily 10 mL 11    guaiFENesin-codeine (ROBITUSSIN AC) 100-10 MG/5ML solution Take 5-10 mLs by mouth nightly as needed for cough 120 mL 0    hypromellose (GENTEAL SEVERE) ophthalmic gel 0.3% USE once at night in the eye. Both eyes 10 g 11    lamoTRIgine (LAMICTAL) 150 MG tablet Take 2 tablets (300 mg) by mouth at bedtime 180 tablet 1    levothyroxine (SYNTHROID/LEVOTHROID) 25 MCG tablet Take 1 tablet (25 mcg) by mouth daily 90 tablet 3    Multiple Minerals-Vitamins (ADVANCED CALCIUM/D/MAGNESIUM) TABS Take 1,500 mg by mouth daily       multivitamin (OCUVITE) TABS Take 1 tablet by mouth daily  30 each 0    omega 3 1000 MG CAPS Take 3 g by mouth daily  90 capsule     traZODone (DESYREL) 50 MG tablet Take 1-2 tablets ( mg) by mouth nightly as needed for sleep 180 tablet 1     No current facility-administered medications for this visit.        The [place] Prescription Monitoring Program has been reviewed and there are no concerns about diversionary activity for controlled substances at this time.      I was able to review most recent Primary Care Provider, specialty provider, and therapy visit notes that I have access to.     Today, patient reports that her balance is bad, vision is bad - blurry.  Going to party tonight.  Has any friends for support.  No sleep concerns. A lot of anxiety - no panic.    No SI.  Cousin continues to call her which can be distressing.  Trouble concentrating on reading novels, watching television.  She would initially like to stop the Abilify.  She found a therapist that she likes by the name of Erika Encarnacion.       has a past medical history of Bipolar 1 disorder (H).    Social history updates:    No change in her social history to report    Substance use updates:    No alcohol use reported  Tobacco use: No    Vital Signs:   There were no vitals taken for this visit.    Labs:    Most recent laboratory results reviewed and no new labs.     Mental Status  Examination:  Appearance: awake, alert and casual dress  Attitude: cooperative  Eye Contact:  adequate  Gait and Station: No dizziness or falls  Psychomotor Behavior:   Unsteady gait  Oriented to:  time, person, and place  Attention Span and Concentration:  Normal  Speech:   vtspeech: clear, coherent and Speaks: English  Mood:  anxious and depressed  Affect:  mood congruent  Associations:  no loose associations  Thought Process:  goal oriented  Thought Content:  no evidence of suicidal ideation or homicidal ideation, no auditory hallucinations present, and no visual hallucinations present  Recent and Remote Memory:  intact Not formally assessed. No amnesia.  Fund of Knowledge: appropriate  Insight:  good  Judgment: good  Impulse Control:  good    Suicide Risk Assessment:  Today Emperatriz Esteban reports no thoughts to harm themself or others. In addition, there are notable risk factors for self-harm, including single status and anxiety. However, risk is mitigated by commitment to family, history of seeking help when needed, future oriented, denies suicidal intent or plan, and denies homicidal ideation, intent, or plan. Therefore, based on all available evidence including the factors cited above, Emperatriz Esteban does not appear to be at imminent risk for self-harm, does not meet criteria for a 72-hr hold, and therefore remains appropriate for ongoing outpatient level of care.  A thorough assessment of risk factors related to suicide and self-harm have been reviewed and are noted above. The patient convincingly denies suicidality on several occasions. Local community safety resources printed and reviewed for patient to use if needed. There was no deceit detected, and the patient presented in a manner that was believable.     DSM5 Diagnosis:  296.89 Bipolar II Disorder Depressed and mild  300.02 (F41.1) Generalized Anxiety Disorder  780.52 (G47.00) Insomnia Disorder   With non-sleep disorder mental comorbidity  Recurrent       Medical comorbidities include:   Patient Active Problem List    Diagnosis Date Noted    Both eyes affected by degenerative myopia with macular hole 03/22/2024     Priority: Medium    Bilateral low back pain without sciatica 07/11/2023     Priority: Medium    Other specified glaucoma 05/26/2023     Priority: Medium    Impaired fasting glucose 12/16/2021     Priority: Medium    Prediabetes 09/02/2016     Priority: Medium    Bipolar affective disorder (H) 09/01/2016     Priority: Medium     Follows with psychiatry in [place], [place] (in [place] for a few months)        Other specified hypothyroidism 09/01/2016     Priority: Medium       Assessment:    Emperatriz Esteban continues to have difficulties with physical symptoms of unsteady gait and vision difficulties.  While this restricts her activities, she still is able to engage in social events with friends and care for her pet.  She would eventually like to stop the aripiprazole for her depression.  We added bupropion in the morning to to see if this would help with her depression and then we can stop the aripiprazole at a later date.  A low-dose of bupropion is added to avoid the possibility of becoming manic.  Signs and symptoms of vince were reviewed today..    Medication side effects and alternatives were reviewed. Health promotion activities recommended and reviewed today. All questions addressed. Education and counseling completed regarding risks and benefits of medications and psychotherapy options.    Treatment Plan:      1.  Continue aripiprazole 5 mg daily for depression  2.  Add bupropion  mg in the morning for depression  3.  Continue lamotrigine 300 mg at bedtime for mood regulation  4.  Continue trazodone 50 to 100 mg at bedtime as needed for insomnia  5.  Continue talk therapy    Continue all other medications as reviewed per electronic medical record today.   Safety plan reviewed. To the Emergency Department as needed or call after hours crisis line at  [phone] or [phone]. [place] Crisis Text Line. Text [place] to 252965 or Suicide LifeLine Chat: suicidepreventionlifeline.org/chat/  To schedule individual or family therapy, call [facility] at [phone]  Schedule an appointment with me in August or sooner as needed. Call [facility] at [phone] to schedule.  Follow up with primary care provider as planned or for acute medical concerns.  Call the psychiatric nurse line with medication questions or concerns at [phone]  MyChart may be used to communicate with your provider, but this is not intended to be used for emergencies.        Crisis Resources   The [facility] is an adults only unit located at [facility] in [place] is a short term (generally less than 23 hour stay) designed for crisis intervention and stabilization. Pts have the opportunity to meet quickly with a behavioral health team for evaluation in a calm and peaceful therapuetic environment. To be evaluated for admission pts are triaged throught the [facility] ED.      The following hotlines are for both adults and children. The and are open 24 hours a day, 7 days a week unless noted otherwise.        Crisis Lines      Crisis Text Line  Text 964833  You will be connected with a trained live crisis counselor to provide support.        [place]ambling Hotline  8.523.902.3827 [hope]        línea de crisis española  [phone]        [facility]  [phone]        National Hope Line  7.393.032.7294 [hope]        National Suicide Prevention Lifeline  Free and confidential support  988 or 8.832.018.TALK [8255]  http://suicidepreventionlifeline.org        The [employer] Project (LGBTQ Youth Crisis Line)  5.119.897.5002  text START to 094-500        's Crisis Line  7.695.801.8442 (Press 1)  or text 905635    [facility] Mental Health Crisis Response  Within [place], call **CRISIS [**117862] to be connected to a mental health  professional who can assist you.        [facility] Crisis  [phone]      [facility] Mobile Crisis  [phone]      [facility] Crisis  [phone]      [facility] Mobile Crisis  [phone] (adults)  [phone] (children)      [facility] Crisis  [phone] (adults)  [phone] (children)      [facility] Mobile Crisis  [phone]      [facility] Mobile [facility]  [phone]    Community Resources      Fast Tracker  Linking people to mental health and substance use disorder resources  [employer].org        [place] Mental Health Warmline  Peer to peer support  5 pm to 9 am 7 days/week  0.586.798.3544  https://mnwitw.org/lawrence        National [place] on Mental Illness (CAN)  155.055.9485 or 1.888.CAN.HELPS  https://namimn.org/        [facility] Urgent Care for Adult Mental Health  [facility] residents [address]  [phone]        [facility]  Free mental health counseling  https://walkin.org/  612.870.0565 X2    Mental Health Apps      Calm Harm  https://[employer].co.uk/      My3  https://my3app.org/      [place] Safety Plan  https://www.mysafetyplan.org/   Administrative Billing:   Time spent with patient includes counseling and coordination of care regarding above diagnoses and treatment plan.    The longitudinal plan of care for the diagnosis(es)/condition(s) as documented were addressed during this visit. Due to the added complexity in care, I will continue to support Eulalia in the subsequent management and with ongoing continuity of care.    Patient Status:  This is a continuous care patient and medications will be prescribed by the psychiatric provider until further indicated.    Signed:   GLORIA Mims-BC   Psychiatry

## 2024-07-19 NOTE — NURSING NOTE
Is the patient currently in the state of [place]? YES    Current patient location: [address] [place] [address] S   [facility] [address]    Visit mode:VIDEO    If the visit is dropped, the patient can be reconnected by: VIDEO VISIT:  Send e-mail to at [email or web address]    Will anyone else be joining the visit? No  (If patient encounters technical issues they should call [phone])    How would you like to obtain your AVS? MyChart    Are changes needed to the allergy or medication list? No    Are refills needed on medications prescribed by this physician? YES    Rooming Documentation: Assigned questionnaire(s) completed .    Reason for visit: KATHY Barr

## 2024-07-19 NOTE — PATIENT INSTRUCTIONS
"Patient Education   The Panel Psychiatry Program  What to Expect  Here's what to expect in the Panel Psychiatry Program.   About the program  You'll be meeting with a psychiatric doctor to check your mental health. A psychiatric doctor helps you deal with troubling thoughts and feelings by giving you medicine. They'll make sure you know the plan for your care. You may see them for a long time. When you're feeling better, they may refer you back to seeing your family doctor.   If you have any questions, we'll be glad to talk to you.  About visits  Be open  At your visits, please talk openly about your problems. It may feel hard, but it's the best way for us to help you.  Cancelling visits  If you can't come to your visit, please call us right away at [phone]. If you don't cancel at least 24 hours (1 full day) before your visit, that's \"late cancellation.\"  Not showing up for your visits  Being very late is the same as not showing up. You'll be a \"no show\" if:  You're more than 15 minutes late for a 30-minute (half hour) visit.  You're more than 30 minutes late for a 60-minute (full hour) visit.  If you cancel late or don't show up 2 times within 6 months, we may end your care.  Getting help between visits  If you need help between visits, you can call us Monday to Friday from 8 a.m. to 4:30 p.m. at [phone].  Emergency care  Call 911 or go to the nearest emergency department if your life or someone else's life is in danger.  Call 988 anytime to reach the national Suicide and Crisis hotline.  Medicine refills  To refill your medicine, call your pharmacy. You can also call [facility]'s Behavioral Access at [phone], Monday to Friday, 8 a.m. to 4:30 p.m. It can take 1 to 3 business days to get a refill.   Forms, letters, and tests  You may have papers to fill out, like FMLA, short-term disability, and workability. We can help you with these forms at your visits, but you must have an " appointment. You may need more than 1 visit for this, to be in an intensive therapy program, or both.  Before we can give you medicine for ADHD, we may refer you to get tested for it or confirm it another way.  We may not be able to give you an emotional support animal letter.  We don't do mental health checks ordered by the court.   We don't do mental health testing, but we can refer you to get tested.   Thank you for choosing us for your care.  For informational purposes only. Not to replace the advice of your health care provider. Copyright   2022 [facility] [employer]. All rights reserved. [employer] [id number] - 12/22.       Treatment Plan:      1.  Continue aripiprazole 5 mg daily for depression  2.  Add bupropion  mg in the morning for depression  3.  Continue lamotrigine 300 mg at bedtime for mood regulation  4.  Continue trazodone 50 to 100 mg at bedtime as needed for insomnia  5.  Continue talk therapy    Continue all other medical directions per primary care provider.   Continue all other medications as reviewed per electronic medical record today.   Safety plan reviewed. To the Emergency Department as needed or call after hours crisis line at [phone] or [phone]. [place] Crisis Text [place]: Text [place] to 222640  or  Suicide LifeLine Chat: suicidepreventionlifeline.org/chat/  To schedule individual or family therapy, call [facility] at [phone].   Schedule an appointment with me in 6 weeks or sooner as needed.  Call [facility] at [phone] to schedule.  Follow up with primary care provider as planned or for acute medical concerns.  Call the psychiatric nurse line with medication questions or concerns at [phone].  MyChart may be used to communicate with your provider, but this is not intended to be used for emergencies.        Crisis Resources   The EmPath is an adults only unit located at [facility] in [place] is a short term (generally less  than 23 hour stay) designed for crisis intervention and stabilization. Pts have the opportunity to meet quickly with a behavioral health team for evaluation in a calm and peaceful therapuetic environment. To be evaluated for admission pts are triaged throught the [facility] ED.      The following hotlines are for both adults and children. The and are open 24 hours a day, 7 days a week unless noted otherwise.        Crisis Lines      Crisis Text Line  Text 716331  You will be connected with a trained live crisis counselor to provide support.        Gambling Hotline  4.987.111.0096 [hope]        línea de crisis española  [phone]        [facility] [employer] Helpline  [phone]        National Hope Line  3.861.088.7660 [hope]        National Suicide Prevention Lifeline  Free and confidential support  988 or 1.129.488.TALK [8255]  http://suicidepreventionlifeline.org        The Damion Project (LGBTQ Youth Crisis Line)  9.015.449.5928  text START to 667-611        [place] Crisis Line  2.299.047.5379 (Press 1)  or text 667625    [facility] Mental Health Crisis Response  Within [place], call **CRISIS [**498560] to be connected to a mental health professional who can assist you.        [facility] Crisis  [phone]      [facility] Mobile Crisis  [phone]      [facility] Crisis  [phone]      [facility] Mobile Crisis  [phone] (adults)  [phone] (children)      [facility] Mobile Crisis  [phone] (adults)  [phone] (children)      [facility] Mobile Crisis  [phone]      [facility] Mobile Crisis  [phone]    Community Resources      Fast Tracker  Linking people to mental health and substance use disorder resources  fasttrackermn.org        [place] Mental Health Warmline  Peer to peer support  5 pm to 9 am 7 days/week  2.308.740.7962  https://mnwitw.org/lawrence        National [place] on Mental Illness (CAN)  237.863.1616 or  1.888.CAN.HELPS  https://namimn.org/        [facility] Urgent Care for Adult Mental Health  [facility] residents [place]   [address] [place]  [phone]        [facility]  Free mental health counseling  https://walkin.org/  612.870.0565 X2    Mental Health Apps      Calm Harm  https://calmharm.co.uk/      My3  https://my3app.org/      Beau Safety Plan  https://www.mysafetyplan.org/

## 2024-08-22 ENCOUNTER — VIRTUAL VISIT (OUTPATIENT)
Dept: PSYCHIATRY | Facility: CLINIC | Age: 69
End: 2024-08-22
Payer: MEDICARE

## 2024-08-22 DIAGNOSIS — F99 INSOMNIA DUE TO OTHER MENTAL DISORDER: ICD-10-CM

## 2024-08-22 DIAGNOSIS — F51.05 INSOMNIA DUE TO OTHER MENTAL DISORDER: ICD-10-CM

## 2024-08-22 DIAGNOSIS — F31.31 BIPOLAR AFFECTIVE DISORDER, CURRENTLY DEPRESSED, MILD (H): ICD-10-CM

## 2024-08-22 DIAGNOSIS — F41.1 GAD (GENERALIZED ANXIETY DISORDER): Primary | ICD-10-CM

## 2024-08-22 PROCEDURE — 99214 OFFICE O/P EST MOD 30 MIN: CPT | Mod: 95 | Performed by: NURSE PRACTITIONER

## 2024-08-22 PROCEDURE — G2211 COMPLEX E/M VISIT ADD ON: HCPCS | Mod: 95 | Performed by: NURSE PRACTITIONER

## 2024-08-22 RX ORDER — GABAPENTIN 100 MG/1
100 CAPSULE ORAL 2 TIMES DAILY
COMMUNITY
Start: 2024-08-22

## 2024-08-22 ASSESSMENT — PATIENT HEALTH QUESTIONNAIRE - PHQ9
SUM OF ALL RESPONSES TO PHQ QUESTIONS 1-9: 3
10. IF YOU CHECKED OFF ANY PROBLEMS, HOW DIFFICULT HAVE THESE PROBLEMS MADE IT FOR YOU TO DO YOUR WORK, TAKE CARE OF THINGS AT HOME, OR GET ALONG WITH OTHER PEOPLE: SOMEWHAT DIFFICULT
SUM OF ALL RESPONSES TO PHQ QUESTIONS 1-9: 3

## 2024-08-22 NOTE — PROGRESS NOTES
"Virtual Visit Details    Type of service:  Video Visit   Video Start Time: 1:42 PM  Video End Time: 2:15 PM    Originating Location (pt. Location): Home    Distant Location (provider location):  On-site  Platform used for Video Visit: [facility]           Outpatient Psychiatric Progress Note    Name: Emperatriz Esteban   : 1955                    Primary Care Provider: JENNIFER Rojas CNP   Therapist: Yes    PHQ-9 scores:      3/4/2024    10:52 AM 2024    10:28 AM 2024    10:49 AM   PHQ-9 SCORE   PHQ-9 Total Score MyChart 4 (Minimal depression) 4 (Minimal depression) 1 (Minimal depression)   PHQ-9 Total Score 4 4 1       VAL-7 scores:      1/10/2023     4:37 PM 2023     1:46 PM 3/4/2024    10:53 AM   VAL-7 SCORE   Total Score  7 (mild anxiety) 15 (severe anxiety)   Total Score 9 7 15       Patient Identification:    Patient is a 69 year old year old, single  White Not  or  female  who presents for return visit with me.  Patient is currently unemployed. Patient attended the session alone. Patient prefers to be called: \"Eulalia\".    Current medications include:   Current Outpatient Medications   Medication Sig Dispense Refill    albuterol (PROAIR HFA/PROVENTIL HFA/VENTOLIN HFA) 108 (90 Base) MCG/ACT inhaler Inhale 2 puffs into the lungs every 6 hours as needed for wheezing or cough 18 g 0    ARIPiprazole (ABILIFY) 5 MG tablet Take 1 tablet (5 mg) by mouth daily 90 tablet 1    atorvastatin (LIPITOR) 20 MG tablet Take 1 tablet (20 mg) by mouth daily 90 tablet 0    benzonatate (TESSALON) 200 MG capsule Take 1 capsule (200 mg) by mouth 3 times daily as needed for cough 30 capsule 0    brimonidine (ALPHAGAN) 0.2 % ophthalmic solution Place 1 drop into both eyes 2 times daily 10 mL 11    buPROPion (WELLBUTRIN SR) 100 MG 12 hr tablet Take 1 tablet (100 mg) by mouth every morning 90 tablet 0    dorzolamide-timolol (COSOPT) 2-0.5 % ophthalmic solution Place 1 drop into both eyes 2 times daily " 10 mL 11    gabapentin (NEURONTIN) 100 MG capsule Take 100 mg by mouth as needed (Taken as needed)      hypromellose (GENTEAL SEVERE) ophthalmic gel 0.3% USE once at night in the eye. Both eyes 10 g 11    lamoTRIgine (LAMICTAL) 150 MG tablet Take 2 tablets (300 mg) by mouth at bedtime 180 tablet 1    levothyroxine (SYNTHROID/LEVOTHROID) 25 MCG tablet Take 1 tablet (25 mcg) by mouth daily 90 tablet 3    Multiple Minerals-Vitamins (ADVANCED CALCIUM/D/MAGNESIUM) TABS Take 1,500 mg by mouth daily       multivitamin (OCUVITE) TABS Take 1 tablet by mouth daily  30 each 0    omega 3 1000 MG CAPS Take 3 g by mouth daily  90 capsule     traZODone (DESYREL) 50 MG tablet Take 1-2 tablets ( mg) by mouth nightly as needed for sleep 180 tablet 1     No current facility-administered medications for this visit.        The [place] Prescription Monitoring Program has been reviewed and there are no concerns about diversionary activity for controlled substances at this time.      I was able to review most recent Primary Care Provider, specialty provider, and therapy visit notes that I have access to.     Today, patient reports that she decided not to take the Wellbutrin.  After talking with a friend and, given her brother's seizure history, she did not want to increase her risk for getting seizures.  Her depression mood is stable.  She has not been falling but continues to be limited in what she is able to do due to vision and ambulatory deficits.  She does have an appointment with neurology at the end of September.  At this point she is having difficulties controlling her anxiety symptoms.       has a past medical history of Bipolar 1 disorder (H).    Social history updates:    No change in social history to report today    Substance use updates:    No alcohol use reported  Tobacco use: No    Vital Signs:   There were no vitals taken for this visit.    Labs:    Most recent laboratory results reviewed and no new labs.      Mental Status Examination:  Appearance: awake, alert, casual dress, and mild distress  Attitude: cooperative  Eye Contact:  adequate  Gait and Station: Dizziness  Psychomotor Behavior:   Unsteady gait reported  Oriented to:  time, person, and place  Attention Span and Concentration:  Normal  Speech:   vtspeech: clear, coherent and Speaks: English  Mood:  anxious and depressed  Affect:  mood congruent  Associations:  no loose associations  Thought Process:  goal oriented  Thought Content:  no evidence of suicidal ideation or homicidal ideation, no auditory hallucinations present, and no visual hallucinations present  Recent and Remote Memory:  intact Not formally assessed. No amnesia.  Fund of Knowledge: appropriate  Insight:  good  Judgment: good  Impulse Control:  good    Suicide Risk Assessment:  Today Emperatriz Esteban reports no thoughts to harm themself or others. In addition, there are notable risk factors for self-harm, including age, single status, and anxiety. However, risk is mitigated by commitment to family, history of seeking help when needed, future oriented, denies suicidal intent or plan, and denies homicidal ideation, intent, or plan. Therefore, based on all available evidence including the factors cited above, Emperatriz Esteban does not appear to be at imminent risk for self-harm, does not meet criteria for a 72-hr hold, and therefore remains appropriate for ongoing outpatient level of care.  A thorough assessment of risk factors related to suicide and self-harm have been reviewed and are noted above. The patient convincingly denies suicidality on several occasions. Local community safety resources printed and reviewed for patient to use if needed. There was no deceit detected, and the patient presented in a manner that was believable.     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  This is partially due to her difficulties ambulating and vision changes.  She is planning to be assessed by neurology at the end of September.  Depression symptoms are stable..    Medication side effects and alternatives were reviewed. Health promotion activities recommended and reviewed today. All questions addressed. Education and counseling completed regarding risks and benefits of medications and psychotherapy options.    Treatment Plan:      1.  Continue aripiprazole 5 mg daily for depression and mood regulation  2.  Bupropion discontinued  3.  Gabapentin 100 mg 2 times daily for anxiety  4.  Lamotrigine 300 mg daily for mood regulation  5.  Trazodone 50 to 100 mg at bedtime as needed for insomnia  6.  Someone will contact you to schedule an appointment with a new psychiatric provider as I will be retiring in September 13.  7.  Continue counseling therapy to help you learn skills to manage life stressors and adjustments    Continue all other medications as reviewed per electronic medical record today.   Safety plan reviewed. To the Emergency Department as needed or call after hours crisis line at [phone] or [phone]. [place] Crisis Text [place]. Text [place] to  061829 or Suicide LifeLine Chat: suicidepreventionlifeline.org/chat/  To schedule individual or family therapy, call [facility] at [phone]  Schedule an appointment  in 2 months or sooner as needed. Call [facility] at [phone] to schedule.  Follow up with primary care provider as planned or for acute medical concerns.  Call the psychiatric nurse line with medication questions or concerns at [phone]  MyChart may be used to communicate with your provider, but this is not intended to be used for emergencies.    Crisis Resources   The EmPath is an adults only unit located at [facility] is a short term (generally less than 23 hour stay) designed for crisis intervention and stabilization. Pts have the opportunity to meet quickly with a behavioral health team for evaluation in a calm and peaceful therapuetic environment. To be evaluated for admission pts are triaged throught the [facility] ED.      The following hotlines are for both adults and children. The and are open 24 hours a day, 7 days a week unless noted otherwise.        Crisis Lines      Crisis Text Line  Text 006542  You will be connected with a trained live crisis counselor to provide support.        Gambling Hotline  0.666.364.9602 [hope]        línea de crisis española  199.002.8032        [facility]  [phone]        National Hope Line  8.680.433.8548 [hope]        National Suicide Prevention Lifeline  Free and confidential support  988 or 4.990.268.TALK [8255]  http://suicidepreventionlifeline.org        The [employer] Project (LGBTQ Youth Crisis Line)  7.044.492.9263  text START to 973-977        [place]'s Crisis Line  0.173.609.2224 (Press 1)  or text 609151    [facility] Mental Health Crisis Response  Within [place], call **CRISIS [**075533] to be connected to a mental health professional who can assist you.        [facility] Crisis  [phone]      [place]  [facility] Mobile Crisis  [phone]      [facility] Crisis  [phone]      [facility] Mobile Crisis  [phone] (adults)  [phone] (children)      [facility] Crisis  [phone] (adults)  [phone] (children)      [facility] Mobile Crisis  [phone]      [facility] Mobile Crisis  [phone]    Community Resources      Fast Tracker  Linking people to mental health and substance use disorder resources  PellePharmn.org        [place] Mental Health Warmline  Peer to peer support  5 pm to 9 am 7 days/week  4.222.108.1182  https://mnwitw.org/lawrence        National [place] on Mental Illness (CAN)  598.699.9708 or 1.888.CAN.HELPS  https://namimn.org/        [facility] Urgent Care for Adult Mental Health  [facility] residents [address]  [phone]        [facility]  Free mental health counseling  https://walkin.org/  [phone] X2    Mental Health Apps      Calm Harm  https://calmharm.co.uk/      My3  https://my3app.org/      Beau Safety Plan  https://www.mysafetyplan.org/   Administrative Billing:   Time spent with patient includes counseling and coordination of care regarding above diagnoses and treatment plan.    Patient Status:  This is a continuous care patient and medications will be prescribed by the psychiatric provider until further indicated.    Signed:   GLORIA Mims-BC   Psychiatry

## 2024-08-22 NOTE — NURSING NOTE
Is the patient currently in the state of [place]? YES    Current patient location: [address] [place] [address]   [facility] [address]    Visit mode:VIDEO    If the visit is dropped, the patient can be reconnected by: TELEPHONE VISIT: Phone number: [phone]    Will anyone else be joining the visit? No  (If patient encounters technical issues they should call [phone])    How would you like to obtain your AVS? MyChart    Are changes needed to the allergy or medication list? No    Rooming Documentation: Questionnaire(s) completed.    Reason for visit: KATHY Mcdermott

## 2024-08-31 NOTE — PATIENT INSTRUCTIONS
"Patient Education   The Panel Psychiatry Program  What to Expect  Here's what to expect in the Panel Psychiatry Program.   About the program  You'll be meeting with a psychiatric doctor to check your mental health. A psychiatric doctor helps you deal with troubling thoughts and feelings by giving you medicine. They'll make sure you know the plan for your care. You may see them for a long time. When you're feeling better, they may refer you back to seeing your family doctor.   If you have any questions, we'll be glad to talk to you.  About visits  Be open  At your visits, please talk openly about your problems. It may feel hard, but it's the best way for us to help you.  Cancelling visits  If you can't come to your visit, please call us right away at [phone]. If you don't cancel at least 24 hours (1 full day) before your visit, that's \"late cancellation.\"  Not showing up for your visits  Being very late is the same as not showing up. You'll be a \"no show\" if:  You're more than 15 minutes late for a 30-minute (half hour) visit.  You're more than 30 minutes late for a 60-minute (full hour) visit.  If you cancel late or don't show up 2 times within 6 months, we may end your care.  Getting help between visits  If you need help between visits, you can call us Monday to Friday from 8 a.m. to 4:30 p.m. at [phone].  Emergency care  Call 911 or go to the nearest emergency department if your life or someone else's life is in danger.  Call 988 anytime to reach the national Suicide and Crisis hotline.  Medicine refills  To refill your medicine, call your pharmacy. You can also call [facility]'s Behavioral Access at [phone], Monday to Friday, 8 a.m. to 4:30 p.m. It can take 1 to 3 business days to get a refill.   Forms, letters, and tests  You may have papers to fill out, like FMLA, short-term disability, and workability. We can help you with these forms at your visits, but you must have an " appointment. You may need more than 1 visit for this, to be in an intensive therapy program, or both.  Before we can give you medicine for ADHD, we may refer you to get tested for it or confirm it another way.  We may not be able to give you an emotional support animal letter.  We don't do mental health checks ordered by the court.   We don't do mental health testing, but we can refer you to get tested.   Thank you for choosing us for your care.  For informational purposes only. Not to replace the advice of your health care provider. Copyright   2022 [facility] [employer]. All rights reserved. "[employer] 670937 - 12/22.  Treatment Plan:      1.  Continue aripiprazole 5 mg daily for depression and mood regulation  2.  Bupropion discontinued  3.  Gabapentin 100 mg 2 times daily for anxiety  4.  Lamotrigine 300 mg daily for mood regulation  5.  Trazodone 50 to 100 mg at bedtime as needed for insomnia  6.  Someone will contact you to schedule an appointment with a new psychiatric provider as I will be retiring in September 13.  7.  Continue counseling therapy to help you learn skills to manage life stressors and adjustments    Continue all other medical directions per primary care provider.   Continue all other medications as reviewed per electronic medical record today.   Safety plan reviewed. To the Emergency Department as needed or call after hours crisis line at [phone] or [phone]. [place] Crisis Text Line: Text [place] to 146382  or  Suicide LifeLine Chat: suicidepreventionlifeline.org/chat/  To schedule individual or family therapy, call [facility] at [phone].   Schedule an appointment in October  or sooner as needed.  Call [facility] at [phone] to schedule.  Follow up with primary care provider as planned or for acute medical concerns.  Call the psychiatric nurse line with medication questions or concerns at [phone].  Relux may be used to communicate  with your provider, but this is not intended to be used for emergencies.        Crisis Resources   The EmPath is an adults only unit located at [facility] in [place] is a short term (generally less than 23 hour stay) designed for crisis intervention and stabilization. Pts have the opportunity to meet quickly with a behavioral health team for evaluation in a calm and peaceful therapuetic environment. To be evaluated for admission pts are triaged throught the [facility] ED.      The following hotlines are for both adults and children. The and are open 24 hours a day, 7 days a week unless noted otherwise.        Crisis Lines      Crisis Text Line  Text 400100  You will be connected with a trained live crisis counselor to provide support.        Gambling Hotline  0.918.177.2049 [hope]        línea de crisis española  561.998.1581        [facility] Helpline  [phone]        National Hope Line  4.726.447.2635 [hope]        National Suicide Prevention Lifeline  Free and confidential support  988 or 1.255.687.TALK [8255]  http://suicidepreventionlifeline.org        The [place] Project (LGBTQ Youth Crisis Line)  4.997.214.5200  text START to 337-677        [place]'s Crisis Line  2.904.193.6170 (Press 1)  or text 634182    [facility] Mental Health Crisis Response  Within [place], call **CRISIS [**789817] to be connected to a mental health professional who can assist you.        [facility] Crisis  [phone]      [facility] Mobile Crisis  [phone]      [facility] Crisis  [phone]      [facility] Mobile Crisis  [phone] (adults)  [phone] (children)      [facility] Mobile Crisis  [phone] (adults)  [phone] (children)      [facility] Mobile Crisis  [phone]      [facility] Mobile Crisis  [phone]    Community Resources      Fast Tracker  Linking people to mental health and substance use disorder resources  fasttrackermn.org         [place] Mental Health Warmline  Peer to peer support  5 pm to 9 am 7 days/week  0.112.000.7810  https://mnwitw.org/lawrence        National [place] on Mental Illness (CAN)  783.502.5299 or 1.888.CAN.HELPS  https://namimn.org/        [facility]  [place] residents [address]  [phone]        [facility]  Free mental health counseling  https://walkin.org/  612.870.0565 X2    Mental Health Apps      Calm Harm  https://calmharm.co.uk/      My3  https://my3app.org/      Beau Safety Plan  https://www.mysafetyplan.org/

## 2024-09-12 NOTE — TELEPHONE ENCOUNTER
Action 9/12/24 MV 4.28pm   Action Taken [place] [facility]     Action 9/16/24 MV 2.16p   Action Taken Images rsolved     2    RECORDS RECEIVED FROM: external   REASON FOR VISIT: Lower extremity neuropathy,    PROVIDER: Dr. Downey   DATE OF APPT: 9/23/24   NOTES (FOR ALL VISITS) STATUS DETAILS   OFFICE NOTE from referring provider Received Niurka Lion NP @ [facility]:  7/2/24   DISCHARGE REPORT from the ER Care Everywhere [facility]:  2/19/24   MEDICATION LIST Care Everywhere    IMAGING  (FOR ALL VISITS)     CT (HEAD, NECK, SPINE) [place]ACS [facility]  CT Head 2/21/24  CT Head 2/12/24  CT Head 2/12/24  CT Cervical Spine 2/12/24

## 2024-09-16 ENCOUNTER — MYC MEDICAL ADVICE (OUTPATIENT)
Dept: PSYCHIATRY | Facility: CLINIC | Age: 69
End: 2024-09-16
Payer: MEDICARE

## 2024-09-18 NOTE — TELEPHONE ENCOUNTER
Behavioral Health Access.       This is a former patient of Malia Vang CNP.  As you know, Malia has retired and there is a transition plan in place.  The transition plan for this patient is that she be established with community based long term psychiatry.  The patient has been messaging the clinic regarding her transition plan.  She is quite anxious and scared having to establish with a new provider.      2.  I spoke to the patient and reviewed the transition plan.  She is quite upset indicating that she was promised that she will remain in the [facility]. Management has reviewed this and it has been determined that she be transitioned to community based long term psychiatry.       3.  A please call the patient and help her get established with a community based long term psychiatry provider.  She has some insurance concerns so please speak to her about this if you are able.  As I mentioned, she is upset and saddened to be going to community based long term psychiatry.  She may need a empathetic ear.     Thank you for your work.      Ciro Liang RN on 9/18/2024 at 2:05 PM    
Management reviewed the transition plan with nursing in morning team huddle.       RN phoned and spoke to the patient.  RN explained to the patient that her transition plan is to community based long term psychiatry.  The patient was upset and angry regarding this plan.  She insisted that Malia Vang had promised she would remain with [facility].  RN listened to the patients concerns and provided empathy. The patient has concerns about insurance coverage.       2.  RN instructed the patient that he will notify [facility] to call her and help guide her through the transition process to a community based long term psychiatry provider.  RN instructed the patient to call [facility] at [phone] if she has not heard from them in the next 48 hours.  The patient verbalized understanding.      3.  RN notified [facility] to please call this patient and assist her getting established with a community based long term psychiatry provider. Please refer to this note in this encounter.      Ciro Liang RN on 9/18/2024 at 1:57 PM        
RN received a [employer] message from this patient indicating she had concerns about there transition plan relating to Malia Vang CNP retiring.       RN replied to the patients [employer] message.    RN notified [facility] Management regarding the patients request to be transitioned to a long term psychiatry provider within the [facility] system.    RN instructed the patient in a [employer] message that management will review this request and that nursing will notify her of the decision.      Ciro Liang RN on 9/17/2024 at 9:58 AM   
What Type Of Note Output Would You Prefer (Optional)?: Bullet Format
How Severe Is Your Rash?: mild
Is This A New Presentation, Or A Follow-Up?: Rash
Additional History: New patient in [facility] but established patient with Dr. Vivas; last seen in 2017. \\nPatient states that rash is 95% better, h/o weeping, initially. Pulmonologist, Dr. Lanny Wade, would like to know what rash is. Patient has a tendency to pick, which worsened affected areas. Currently using OTC Aveeno lotion; pcp prescribed TAC 0.1% Cream ( no longer using). Flu vaccine was administered today.

## 2024-09-23 ENCOUNTER — PRE VISIT (OUTPATIENT)
Dept: NEUROLOGY | Facility: CLINIC | Age: 69
End: 2024-09-23

## 2024-09-23 ENCOUNTER — OFFICE VISIT (OUTPATIENT)
Dept: NEUROLOGY | Facility: CLINIC | Age: 69
End: 2024-09-23
Attending: NURSE PRACTITIONER
Payer: MEDICARE

## 2024-09-23 VITALS
OXYGEN SATURATION: 96 % | SYSTOLIC BLOOD PRESSURE: 142 MMHG | RESPIRATION RATE: 16 BRPM | HEART RATE: 65 BPM | DIASTOLIC BLOOD PRESSURE: 85 MMHG

## 2024-09-23 DIAGNOSIS — Z86.79 HISTORY OF SUBARACHNOID HEMORRHAGE: ICD-10-CM

## 2024-09-23 DIAGNOSIS — F41.9 ANXIETY: ICD-10-CM

## 2024-09-23 DIAGNOSIS — R26.89 BALANCE PROBLEMS: Primary | ICD-10-CM

## 2024-09-23 PROCEDURE — 99205 OFFICE O/P NEW HI 60 MIN: CPT | Performed by: STUDENT IN AN ORGANIZED HEALTH CARE EDUCATION/TRAINING PROGRAM

## 2024-09-23 ASSESSMENT — PAIN SCALES - GENERAL: PAINLEVEL: NO PAIN (0)

## 2024-09-23 NOTE — LETTER
9/23/2024       RE: Emperatriz Esteban  [address]  [place] [address]     Dear Colleague,    Thank you for referring your patient, Emperatriz Esteban, to the [facility]. Please see a copy of my visit note below.    CHIEF COMPLAINT / REASON FOR VISIT  Balance issues    Referred by Dr. Lion    HISTORY OF PRESENT ILLNESS   is a 69 year old female presenting to [facility] for evaluation of balance issues. Patient is accompanied by her friend, who helped provide collateral history today.    She is following with psychiatry for bipolar disorder and generalized anxiety disorder.  She is currently on aripiprazole, lamotrigine, and trazodone.    She started to have issues with balance 4 years ago.  In 2020, she had 2 episodes where she could not walk straight for a few hours and spontaneously improved.  There was no associating dizziness or nausea or vertigo.  She was evaluated in urgent care and CT head was unremarkable.  Since then she has always felt that her balance has never been good.    In February 2024 (7 months ago), she had a fall while walking up an incline.  This resulted in small inferior left frontal lobe subarachnoid hemorrhage, left orbital roof fracture extending into the left frontal bone.  This was treated conservatively.  Repeat CT scan showed interval resolution of the subarachnoid hemorrhage.  Since then, she has been afraid of falling.  She feels more unsteady walking outside but is able to walk better inside a building.  She has to hold onto objects when walking.  She stops walking her dog as she is afraid of another fall. She denies a sensation of dizziness, lightheadedness, or passing out. No weakness in the legs. She cannot do stairs due to her poor vision as she is not able to see the steps. She also attributes her imbalance to poor vision. She has completed PT. She has  learned several exercises but hasn't been doing this regularly. There is no tremor. No change in hand writing, speech, swallowing. No dream enactment behavior. No hallucinations. No change in her memory.     She has 2 siblings. Her brother has parkinson's disease. No other family member with Parkinson's.     REVIEW OF SYSTEMS  All negative except for what indicated in the HPI. The following portions of the patient's history were reviewed and updated as appropriate: allergies, current medications, family history, medical history, surgical history, social history, and problem list.     PAST MEDICAL/SURGICAL HISTORY   Past Medical History:   Diagnosis Date     Bipolar 1 disorder (H)      Past Surgical History:   Procedure Laterality Date     CATARACT IOL, RT/LT Bilateral 1997     VITRECTOMY ANTERIOR Right 2019    for floaters         MEDICATIONS    Current Outpatient Medications:      albuterol (PROAIR HFA/PROVENTIL HFA/VENTOLIN HFA) 108 (90 Base) MCG/ACT inhaler, Inhale 2 puffs into the lungs every 6 hours as needed for wheezing or cough, Disp: 18 g, Rfl: 0     ARIPiprazole (ABILIFY) 5 MG tablet, Take 1 tablet (5 mg) by mouth daily, Disp: 90 tablet, Rfl: 1     atorvastatin (LIPITOR) 20 MG tablet, Take 1 tablet (20 mg) by mouth daily, Disp: 90 tablet, Rfl: 0     benzonatate (TESSALON) 200 MG capsule, Take 1 capsule (200 mg) by mouth 3 times daily as needed for cough, Disp: 30 capsule, Rfl: 0     brimonidine (ALPHAGAN) 0.2 % ophthalmic solution, Place 1 drop into both eyes 2 times daily, Disp: 10 mL, Rfl: 11     dorzolamide-timolol (COSOPT) 2-0.5 % ophthalmic solution, Place 1 drop into both eyes 2 times daily, Disp: 10 mL, Rfl: 11     gabapentin (NEURONTIN) 100 MG capsule, Take 1 capsule (100 mg) by mouth 2 times daily., Disp: , Rfl:      hypromellose (GENTEAL SEVERE) ophthalmic gel 0.3%, USE once at night in the eye. Both eyes, Disp: 10 g, Rfl: 11     lamoTRIgine (LAMICTAL) 150 MG tablet, Take 2 tablets (300 mg) by  mouth at bedtime, Disp: 180 tablet, Rfl: 1     levothyroxine (SYNTHROID/LEVOTHROID) 25 MCG tablet, Take 1 tablet (25 mcg) by mouth daily, Disp: 90 tablet, Rfl: 3     Multiple Minerals-Vitamins (ADVANCED CALCIUM/D/MAGNESIUM) TABS, Take 1,500 mg by mouth daily , Disp: , Rfl:      multivitamin (OCUVITE) TABS, Take 1 tablet by mouth daily , Disp: 30 each, Rfl: 0     omega 3 1000 MG CAPS, Take 3 g by mouth daily , Disp: 90 capsule, Rfl:      traZODone (DESYREL) 50 MG tablet, Take 1-2 tablets ( mg) by mouth nightly as needed for sleep, Disp: 180 tablet, Rfl: 1    ALLERGIES:  Allergies   Allergen Reactions     Prednisone      Has glaucoma, potential for ocular pressure spike.  She has never been on steroids.         PHYSICAL EXAM  NEUROLOGICAL EXAMINATION  Mental status: normal.  Cranial nerves: normal pursuits and saccades. No nystagmus.   Muscle strength: Normal muscle strength 5/5 proximally and distally in upper and lower limbs.  Extrapyramidal: Normal tone, No rigidity. No tremor. No bradykinesia.   Sensation: Intact sensation to light touch and  vibration  in upper and lower limbs.  Deep tendon reflexes: Normal reflexes in the upper and patella. Mildly reduced bilateral ankle reflexes  Coordination: normal rapid alternating movements and finger to nose testing. Normal heel-to-shin. No ataxia.   Gait: cautious gait. Slightly reduced right armswing. Able to stand on one foot on both sides.   Getting up from seated position without pushing on chair: yes.  Posture: mild stoop.   Romberg: initially refused to close her eyes but eventually was able to do so without falling.     ASSESSMENT / PLAN  #1 Balance difficulties, fear of falling  #2 History of fall with  left orbital roof fracture extending into the left frontal bone and small SAH  #3 myopic degeneration with poor vision  #4 VAL     Ms. Esteban's neurological exam today was unremarkable. There was no compelling signs of neuropathy, cerebellar dysfunction, or  parkinsonism that could explain her balance difficulties. We will obtain MRI brain w/wo gd to rule out structural lesion as well as looking for signs of neurodegenerative process. If MRI is unrevealing, I suspect her subjective poor balance is secondary to a combination of impaired vision and anxiety/fear of falling after a major fall in February. She has learned several exercises from PT and I encourage her to continue to do them by herself at home. She should also discuss with her psychiatrist as she may benefit from cognitive behavioral therapy.     - MRI brain w/wo gd  - Follow-up after MRI brain    I spent a total of 60 minutes on the day of the visit for chart review, face-to-face visit, counseling/coordination of care, and documentation. Please see the note for further information on patient assessment and treatment.       PATIENT EDUCATION  Ready to learn, no apparent learning barriers were identified; learning preferences include listening.  Explained diagnosis and treatment plan; patient expressed understanding of the content.      Again, thank you for allowing me to participate in the care of your patient.      Sincerely,    Jared Downey MD

## 2024-09-23 NOTE — NURSING NOTE
Chief Complaint   Patient presents with    New Patient       Patient states she is nervous about the appt today, so that's why her BP is higher.  Provider advised prior to visit.    Jayleen HUNTERP

## 2024-09-23 NOTE — PROGRESS NOTES
CHIEF COMPLAINT / REASON FOR VISIT  Balance issues    Referred by Dr. Lion    HISTORY OF PRESENT ILLNESS   is a 69 year old female presenting [facility] evaluation of balance issues. Patient is accompanied by her friend, who helped provide collateral history today.    She is following with psychiatry for bipolar disorder and generalized anxiety disorder.  She is currently on aripiprazole, lamotrigine, and trazodone.    She started to have issues with balance 4 years ago.  In 2020, she had 2 episodes where she could not walk straight for a few hours and spontaneously improved.  There was no associating dizziness or nausea or vertigo.  She was evaluated in urgent care and CT head was unremarkable.  Since then she has always felt that her balance has never been good.    In February 2024 (7 months ago), she had a fall while walking up an incline.  This resulted in small inferior left frontal lobe subarachnoid hemorrhage, left orbital roof fracture extending into the left frontal bone.  This was treated conservatively.  Repeat CT scan showed interval resolution of the subarachnoid hemorrhage.  Since then, she has been afraid of falling.  She feels more unsteady walking outside but is able to walk better inside a building.  She has to hold onto objects when walking.  She stops walking her dog as she is afraid of another fall. She denies a sensation of dizziness, lightheadedness, or passing out. No weakness in the legs. She cannot do stairs due to her poor vision as she is not able to see the steps. She also attributes her imbalance to poor vision. She has completed PT. She has learned several exercises but hasn't been doing this regularly. There is no tremor. No change in hand writing, speech, swallowing. No dream enactment behavior. No hallucinations. No change in her memory.     She has 2 siblings. Her brother has parkinson's disease. No other family member with Parkinson's.     REVIEW OF  SYSTEMS  All negative except for what indicated in the HPI. The following portions of the patient's history were reviewed and updated as appropriate: allergies, current medications, family history, medical history, surgical history, social history, and problem list.     PAST MEDICAL/SURGICAL HISTORY   Past Medical History:   Diagnosis Date    Bipolar 1 disorder (H)      Past Surgical History:   Procedure Laterality Date    CATARACT IOL, RT/LT Bilateral 1997    VITRECTOMY ANTERIOR Right 2019    for floaters         MEDICATIONS    Current Outpatient Medications:     albuterol (PROAIR HFA/PROVENTIL HFA/VENTOLIN HFA) 108 (90 Base) MCG/ACT inhaler, Inhale 2 puffs into the lungs every 6 hours as needed for wheezing or cough, Disp: 18 g, Rfl: 0    ARIPiprazole (ABILIFY) 5 MG tablet, Take 1 tablet (5 mg) by mouth daily, Disp: 90 tablet, Rfl: 1    atorvastatin (LIPITOR) 20 MG tablet, Take 1 tablet (20 mg) by mouth daily, Disp: 90 tablet, Rfl: 0    benzonatate (TESSALON) 200 MG capsule, Take 1 capsule (200 mg) by mouth 3 times daily as needed for cough, Disp: 30 capsule, Rfl: 0    brimonidine (ALPHAGAN) 0.2 % ophthalmic solution, Place 1 drop into both eyes 2 times daily, Disp: 10 mL, Rfl: 11    dorzolamide-timolol (COSOPT) 2-0.5 % ophthalmic solution, Place 1 drop into both eyes 2 times daily, Disp: 10 mL, Rfl: 11    gabapentin (NEURONTIN) 100 MG capsule, Take 1 capsule (100 mg) by mouth 2 times daily., Disp: , Rfl:     hypromellose (GENTEAL SEVERE) ophthalmic gel 0.3%, USE once at night in the eye. Both eyes, Disp: 10 g, Rfl: 11    lamoTRIgine (LAMICTAL) 150 MG tablet, Take 2 tablets (300 mg) by mouth at bedtime, Disp: 180 tablet, Rfl: 1    levothyroxine (SYNTHROID/LEVOTHROID) 25 MCG tablet, Take 1 tablet (25 mcg) by mouth daily, Disp: 90 tablet, Rfl: 3    Multiple Minerals-Vitamins (ADVANCED CALCIUM/D/MAGNESIUM) TABS, Take 1,500 mg by mouth daily , Disp: , Rfl:     multivitamin (OCUVITE) TABS, Take 1 tablet by mouth daily  , Disp: 30 each, Rfl: 0    omega 3 1000 MG CAPS, Take 3 g by mouth daily , Disp: 90 capsule, Rfl:     traZODone (DESYREL) 50 MG tablet, Take 1-2 tablets ( mg) by mouth nightly as needed for sleep, Disp: 180 tablet, Rfl: 1    ALLERGIES:  Allergies   Allergen Reactions    Prednisone      Has glaucoma, potential for ocular pressure spike.  She has never been on steroids.         PHYSICAL EXAM  NEUROLOGICAL EXAMINATION  Mental status: normal.  Cranial nerves: normal pursuits and saccades. No nystagmus.   Muscle strength: Normal muscle strength 5/5 proximally and distally in upper and lower limbs.  Extrapyramidal: Normal tone, No rigidity. No tremor. No bradykinesia.   Sensation: Intact sensation to light touch and  vibration  in upper and lower limbs.  Deep tendon reflexes: Normal reflexes in the upper and patella. Mildly reduced bilateral ankle reflexes  Coordination: normal rapid alternating movements and finger to nose testing. Normal heel-to-shin. No ataxia.   Gait: cautious gait. Slightly reduced right armswing. Able to stand on one foot on both sides.   Getting up from seated position without pushing on chair: yes.  Posture: mild stoop.   Romberg: initially refused to close her eyes but eventually was able to do so without falling.     ASSESSMENT / PLAN  #1 Balance difficulties, fear of falling  #2 History of fall with  left orbital roof fracture extending into the left frontal bone and small SAH  #3 myopic degeneration with poor vision  #4 VAL     Ms. Esteban's neurological exam today was unremarkable. There was no compelling signs of neuropathy, cerebellar dysfunction, or parkinsonism that could explain her balance difficulties. We will obtain MRI brain w/wo gd to rule out structural lesion as well as looking for signs of neurodegenerative process. If MRI is unrevealing, I suspect her subjective poor balance is secondary to a combination of impaired vision and anxiety/fear of falling after a major fall in  February. She has learned several exercises from PT and I encourage her to continue to do them by herself at home. She should also discuss with her psychiatrist as she may benefit from cognitive behavioral therapy.     - MRI brain w/wo gd  - Follow-up after MRI brain    I spent a total of 60 minutes on the day of the visit for chart review, face-to-face visit, counseling/coordination of care, and documentation. Please see the note for further information on patient assessment and treatment.       PATIENT EDUCATION  Ready to learn, no apparent learning barriers were identified; learning preferences include listening.  Explained diagnosis and treatment plan; patient expressed understanding of the content.

## 2024-10-03 ENCOUNTER — ANCILLARY PROCEDURE (OUTPATIENT)
Dept: MRI IMAGING | Facility: CLINIC | Age: 69
End: 2024-10-03
Attending: STUDENT IN AN ORGANIZED HEALTH CARE EDUCATION/TRAINING PROGRAM
Payer: MEDICARE

## 2024-10-03 DIAGNOSIS — R26.89 BALANCE PROBLEMS: ICD-10-CM

## 2024-10-03 PROCEDURE — 70553 MRI BRAIN STEM W/O & W/DYE: CPT | Mod: MG | Performed by: RADIOLOGY

## 2024-10-03 PROCEDURE — G1010 CDSM STANSON: HCPCS | Performed by: RADIOLOGY

## 2024-10-03 PROCEDURE — A9585 GADOBUTROL INJECTION: HCPCS | Mod: JZ | Performed by: RADIOLOGY

## 2024-10-03 RX ORDER — GADOBUTROL 604.72 MG/ML
7.5 INJECTION INTRAVENOUS ONCE
Status: COMPLETED | OUTPATIENT
Start: 2024-10-03 | End: 2024-10-03

## 2024-10-03 RX ADMIN — GADOBUTROL 7.5 ML: 604.72 INJECTION INTRAVENOUS at 18:35

## 2024-10-07 ENCOUNTER — VIRTUAL VISIT (OUTPATIENT)
Dept: NEUROLOGY | Facility: CLINIC | Age: 69
End: 2024-10-07
Payer: MEDICARE

## 2024-10-07 ENCOUNTER — TELEPHONE (OUTPATIENT)
Dept: NEUROLOGY | Facility: CLINIC | Age: 69
End: 2024-10-07

## 2024-10-07 VITALS — WEIGHT: 160 LBS | BODY MASS INDEX: 25.71 KG/M2 | HEIGHT: 66 IN

## 2024-10-07 DIAGNOSIS — F41.9 ANXIETY: ICD-10-CM

## 2024-10-07 DIAGNOSIS — R26.89 BALANCE PROBLEMS: Primary | ICD-10-CM

## 2024-10-07 DIAGNOSIS — G93.89 CEREBRAL VENTRICULOMEGALY: ICD-10-CM

## 2024-10-07 PROCEDURE — 99214 OFFICE O/P EST MOD 30 MIN: CPT | Mod: 95 | Performed by: STUDENT IN AN ORGANIZED HEALTH CARE EDUCATION/TRAINING PROGRAM

## 2024-10-07 ASSESSMENT — PAIN SCALES - GENERAL: PAINLEVEL: EXTREME PAIN (8)

## 2024-10-07 NOTE — NURSING NOTE
Current patient location: [address] [place] [address]   [facility] [address]    Is the patient currently in the state of [place]? YES    Visit mode:VIDEO    If the visit is dropped, the patient can be reconnected by: VIDEO VISIT: Send to e-mail at: [email or web address]    Will anyone else be joining the visit? NO  (If patient encounters technical issues they should call [phone]    Are changes needed to the allergy or medication list? Pt stated no changes to allergies and Pt stated no med changes    Are refills needed on medications prescribed by this physician? NO    Rooming Documentation:  Not applicable    Reason for visit: BEAU HALLF

## 2024-10-07 NOTE — PROGRESS NOTES
CHIEF COMPLAINT / REASON FOR VISIT  Eulalia returned to discuss the results of the tests and evaluations.   Consult conducted via real-time audio/video technology by Jared Downey M.D. in [facility] clinic to the patient at home.    MRI brain w/wo gd did not show intracranial pathology but there was mild ventriculomegaly out of proportion to the cerebral sulci. This was interpreted as suggestive of normal pressure hydrocephalus.     Her symptoms have not changed since prior visit    The following portions of the patient's history were reviewed and updated as appropriate: allergies, current medications, family history, medical history, surgical history, social history, and problem list.     ASSESSMENT / PLAN   #1 Balance difficulties, fear of falling  #2 History of fall with  left orbital roof fracture extending into the left frontal bone and small SAH  #3 Mild ventriculomegaly on MRI  #4 myopic degeneration with poor vision  #5 VAL    We discussed the result of the MRI, which showed mild ventriculomegaly.  The finding could suggest normal pressure hydrocephalus.  However, she did not have characteristic magnetic gait on exam.  She does not have cognitive impairment or urinary disturbance.  Secondary NPH in the setting of prior subarachnoid hemorrhage is also possible, but this would not explain her balance difficulties as her symptoms predated the hemorrhage.  The degree of subarachnoid hemorrhage was also very minimal and unlikely to cause CSF flow dysfunction.  Given I have not found any other neurological cause of her balance difficulties, normal pressure hydrocephalus could not be entirely excluded.  I would like to get an opinion from our movement disorder specialist to see if there is any role of large-volume spinal tap.    I spent a total of 30 minutes on the day of the visit for chart review, virtual visit, counseling/coordination of care, and documentation. Please see the note  for further information on patient assessment and treatment.       PATIENT EDUCATION  Ready to learn, no apparent learning barriers were identified; learning preferences include listening.  Explained diagnosis and treatment plan; patient expressed understanding of the content.

## 2024-10-07 NOTE — LETTER
10/7/2024       RE: Emperatriz Esteban  [address]  [facility] [address]     Dear Colleague,    Thank you for referring your patient, Emperatriz Esteban, to the [facility] [place] [facility]. Please see a copy of my visit note below.    CHIEF COMPLAINT / REASON FOR VISIT  Eulalia returned to discuss the results of the tests and evaluations.   Consult conducted via real-time audio/video technology by Jared Downey M.D. in [facility] clinic to the patient at home.    MRI brain w/wo gd did not show intracranial pathology but there was mild ventriculomegaly out of proportion to the cerebral sulci. This was interpreted as suggestive of normal pressure hydrocephalus.     Her symptoms have not changed since prior visit    The following portions of the patient's history were reviewed and updated as appropriate: allergies, current medications, family history, medical history, surgical history, social history, and problem list.     ASSESSMENT / PLAN   #1 Balance difficulties, fear of falling  #2 History of fall with  left orbital roof fracture extending into the left frontal bone and small SAH  #3 Mild ventriculomegaly on MRI  #4 myopic degeneration with poor vision  #5 VAL    We discussed the result of the MRI, which showed mild ventriculomegaly.  The finding could suggest normal pressure hydrocephalus.  However, she did not have characteristic magnetic gait on exam.  She does not have cognitive impairment or urinary disturbance.  Secondary NPH in the setting of prior subarachnoid hemorrhage is also possible, but this would not explain her balance difficulties as her symptoms predated the hemorrhage.  The degree of subarachnoid hemorrhage was also very minimal and unlikely to cause CSF flow dysfunction.  Given I have not found any other neurological cause of her balance difficulties, normal pressure  hydrocephalus could not be entirely excluded.  I would like to get an opinion from our movement disorder specialist to see if there is any role of large-volume spinal tap.    I spent a total of 30 minutes on the day of the visit for chart review, virtual visit, counseling/coordination of care, and documentation. Please see the note for further information on patient assessment and treatment.       PATIENT EDUCATION  Ready to learn, no apparent learning barriers were identified; learning preferences include listening.  Explained diagnosis and treatment plan; patient expressed understanding of the content.      Again, thank you for allowing me to participate in the care of your patient.      Sincerely,    Jared Downey MD

## 2024-11-03 PROBLEM — E03.9 HYPOTHYROIDISM, UNSPECIFIED TYPE: Status: ACTIVE | Noted: 2024-11-03

## 2024-11-03 PROBLEM — R09.89 OTHER SPECIFIED SYMPTOMS AND SIGNS INVOLVING THE CIRCULATORY AND RESPIRATORY SYSTEMS: Status: ACTIVE | Noted: 2018-11-28

## 2024-11-03 PROBLEM — Z80.41 FAMILY HISTORY OF MALIGNANT NEOPLASM OF OVARY: Status: ACTIVE | Noted: 2019-04-19

## 2024-11-03 PROBLEM — G62.9 POLYNEUROPATHY: Status: ACTIVE | Noted: 2017-02-17

## 2024-11-03 PROBLEM — R42 DIZZINESS: Status: ACTIVE | Noted: 2024-11-03

## 2024-11-03 PROBLEM — S02.85XA: Status: ACTIVE | Noted: 2024-02-12

## 2024-11-03 PROBLEM — F41.1 GAD (GENERALIZED ANXIETY DISORDER): Status: ACTIVE | Noted: 2024-11-03

## 2024-11-03 PROBLEM — S02.0XXB: Status: ACTIVE | Noted: 2024-02-12

## 2024-11-03 PROBLEM — E78.5 HYPERLIPIDEMIA: Status: ACTIVE | Noted: 2019-04-19
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
## 2024-11-03 PROBLEM — E78.2 MIXED HYPERLIPIDEMIA: Status: ACTIVE | Noted: 2017-01-26

## 2024-11-03 PROBLEM — R27.9 INCOORDINATION: Status: ACTIVE | Noted: 2018-02-09

## 2024-11-03 PROBLEM — J45.909 UNCOMPLICATED ASTHMA: Status: ACTIVE | Noted: 2024-11-03

## 2024-11-03 PROBLEM — I60.9 SAH (SUBARACHNOID HEMORRHAGE) (H): Status: ACTIVE | Noted: 2024-02-12

## 2024-11-03 PROBLEM — S06.9X0A TRAUMATIC BRAIN INJURY, WITHOUT LOSS OF CONSCIOUSNESS, INITIAL ENCOUNTER (H): Status: ACTIVE | Noted: 2024-02-12

## 2024-11-03 RX ORDER — LEVOTHYROXINE SODIUM 150 UG/1
150 TABLET ORAL DAILY
COMMUNITY
Start: 2023-11-20

## 2024-11-03 RX ORDER — PROPRANOLOL HYDROCHLORIDE 10 MG/1
TABLET ORAL
COMMUNITY
End: 2024-11-26

## 2024-11-03 RX ORDER — MONTELUKAST SODIUM 10 MG/1
10 TABLET ORAL AT BEDTIME
COMMUNITY
End: 2024-11-26

## 2024-11-03 RX ORDER — MECLIZINE HYDROCHLORIDE 25 MG/1
TABLET ORAL
COMMUNITY
End: 2024-11-26

## 2024-11-03 RX ORDER — DORZOLAMIDE HYDROCHLORIDE AND TIMOLOL MALEATE 20; 5 MG/ML; MG/ML
SOLUTION/ DROPS OPHTHALMIC
COMMUNITY

## 2024-11-03 RX ORDER — OMEPRAZOLE/SODIUM BICARBONATE 20MG-1.1G
CAPSULE ORAL
COMMUNITY

## 2024-11-03 NOTE — PROGRESS NOTES
Diagnosis/Summary/Recommendations:    Finish note - see ans note    PATIENT: Emperatriz Esteban  69 year old female     : 1955    HAROLDO: 2024       MRN: [id number]    [address]   [facility] [address]  Mobile Phone  [phone]  Email  [email or web address]      Yumiko Esteban  Emergency Contact, Cousin  Young Proxy Access  D  Daniela Esteban  Emergency Contact, Sister  Young Proxy Access       (Home)  [address] [place] [address]    Former (Sep. 28, 2021 - 2024):   (Home)  [address] [place] [address] Emperatriz Esteban [phone] (Home)  [email or web address]       Assessment:  (R26.89) Balance problems  (primary encounter diagnosis)  (Z86.79) History of subarachnoid hemorrhage  Neuropathy    Head CT 2024  Impression:   1. Interval resolution of left frontal subarachnoid hemorrhage with no new intracranial pathology.   2. Stable left orbital roof and frontal bone fracture with associated left frontal scalp hematoma.     Brain MRI 10/3/2024  1. No acute intracranial pathology.  2. Mild ventriculomegaly out of proportion to the cerebral sulci  suggestive of underlying normal pressure hydrocephalus.      Review of diagnosis    ***    Avoidance of dopamine blockers   ***    Motor complication review   ***    Review of Impulse control disorders   ***    Review of surgical or medication options   ***    Gait/Balance/Falls   ***    Exercise/Therapy performed/offered   ***    Cognitive/Driving   ***    Mood   Bipolar affective disorder    Hallucinations/delusions   ***    Sleep   ***    Bladder/Renal/Prostate/Gyn/Other   ***    GI/Constipation/GERD   gerd    ENDO/Lipid/DM/Bone density/Thyroid  Lipid disoder  Prediabtges  Hyeprglycemia  Hypothyroidism  Lipd disorder    Cardio/heart/Hyper or Hypotensive   ***    Vision/Dry Eyes/Cataracts/Glaucoma/Macular   Macular hole  bilater  glaucoma    Heme/Anticoagulation/Antiplatelet/Anemia/Other  ***    ENT/Resp  Dizziness  asthma    Skin/Cancer/Seborrhea/other  ***    Musculoskeletal/Pain/Headache  Cerebrovascular disease  Fatgigue  Incoordination  Closed fracture of orbit  TBI/SAH  Polyneuropathy  Bilateral low back pain   Dizziness     Other:  Menopausal  Fibrocystic breast disease    Family history of ovarian cancer      Medications            Albuterol inhaler        Aripiprazole abilify 5mg         Atorvastatin lipitor 20mg         Benzonatate tessalon 200mg         Brimonidine alphage 0.2% son        Dorzolamide - timolol cosopt 2 - 0.5%        Gabapentin neurontin 100mg        Hypromellose genteal ophthal gel         Lamotrigine lamictal 150mg         Levothyroxine synthroid 150mcg         Levothyroxine synthroid 25mcg         Meclizine antivert 25mg         Montelukast singulair 10mg         MVI adv sayra/d/mag        MVI ocuvite         Omega 3 1000mg         Omeprazole sodium bicarbonate 20 - 1100        Propranolol inderal 10mg Prn        Trazodone desyrel 50mg    Prn               Plan:                  Coding statement:   Medical Decision Making:  #  Chronic progressive medical conditions addressed  - see above --   Review and/or interpretation of unique test or documentation from a provider outside of neurology ***   Independent historian provided additional details  *** I  Prescription drug management and review of potential side effects and/or monitoring for side effects  -- see above ---  Health impacted by social determinants of health  ***    I have reviewed the note as documented above.  This accurately captures the substance of my conversation with the patient and total time spent preparing for visit, executing visit and completing visit on the day of the visit:  *** minutes.  The portion of this total time included face to face time ***    The longitudinal plan of care for Emperatriz Esteban was addressed during this  visit. Due to the added complexity in care, I will continue to support Emperatriz JACOBO Eulalia in the subsequent management of this condition(s) and with the ongoing continuity of care of this condition(s).      Bruce Rodrigez MD     ______________________________________    Last visit date and details:             10/7/2024  [facility] Neurology Clinic [place]  CHIEF COMPLAINT / REASON FOR VISIT  Eulalia returned to discuss the results of the tests and evaluations.   Consult conducted via real-time audio/video technology by Jared Downey M.D. in [facility] Neurology clinic to the patient at home.     MRI brain w/wo gd did not show intracranial pathology but there was mild ventriculomegaly out of proportion to the cerebral sulci. This was interpreted as suggestive of normal pressure hydrocephalus.      Her symptoms have not changed since prior visit     The following portions of the patient's history were reviewed and updated as appropriate: allergies, current medications, family history, medical history, surgical history, social history, and problem list.      ASSESSMENT / PLAN   #1 Balance difficulties, fear of falling  #2 History of fall with  left orbital roof fracture extending into the left frontal bone and small SAH  #3 Mild ventriculomegaly on MRI  #4 myopic degeneration with poor vision  #5 VAL     We discussed the result of the MRI, which showed mild ventriculomegaly.  The finding could suggest normal pressure hydrocephalus.  However, she did not have characteristic magnetic gait on exam.  She does not have cognitive impairment or urinary disturbance.  Secondary NPH in the setting of prior subarachnoid hemorrhage is also possible, but this would not explain her balance difficulties as her symptoms predated the hemorrhage.  The degree of subarachnoid hemorrhage was also very minimal and unlikely to cause CSF flow dysfunction.  Given I have not found any other neurological cause of her  balance difficulties, normal pressure hydrocephalus could not be entirely excluded.  I would like to get an opinion from our movement disorder specialist to see if there is any role of large-volume spinal tap.             9/23/2024  [facility] Neurology Clinic [place]  Balance issues     Referred by Dr. Lion     HISTORY OF PRESENT ILLNESS   is a 69 year old female presenting to Neuromuscular Clinic for evaluation of balance issues. Patient is accompanied by her friend, who helped provide collateral history today.     She is following with psychiatry for bipolar disorder and generalized anxiety disorder.  She is currently on aripiprazole, lamotrigine, and trazodone.     She started to have issues with balance 4 years ago.  In 2020, she had 2 episodes where she could not walk straight for a few hours and spontaneously improved.  There was no associating dizziness or nausea or vertigo.  She was evaluated in urgent care and CT head was unremarkable.  Since then she has always felt that her balance has never been good.     In February 2024 (7 months ago), she had a fall while walking up an incline.  This resulted in small inferior left frontal lobe subarachnoid hemorrhage, left orbital roof fracture extending into the left frontal bone.  This was treated conservatively.  Repeat CT scan showed interval resolution of the subarachnoid hemorrhage.  Since then, she has been afraid of falling.  She feels more unsteady walking outside but is able to walk better inside a building.  She has to hold onto objects when walking.  She stops walking her dog as she is afraid of another fall. She denies a sensation of dizziness, lightheadedness, or passing out. No weakness in the legs. She cannot do stairs due to her poor vision as she is not able to see the steps. She also attributes her imbalance to poor vision. She has completed PT. She has learned several exercises but hasn't been doing this regularly. There  is no tremor. No change in hand writing, speech, swallowing. No dream enactment behavior. No hallucinations. No change in her memory.      She has 2 siblings. Her brother has parkinson's disease. No other family member with Parkinson's.     PHYSICAL EXAM  NEUROLOGICAL EXAMINATION  Mental status: normal.  Cranial nerves: normal pursuits and saccades. No nystagmus.   Muscle strength: Normal muscle strength 5/5 proximally and distally in upper and lower limbs.  Extrapyramidal: Normal tone, No rigidity. No tremor. No bradykinesia.   Sensation: Intact sensation to light touch and  vibration  in upper and lower limbs.  Deep tendon reflexes: Normal reflexes in the upper and patella. Mildly reduced bilateral ankle reflexes  Coordination: normal rapid alternating movements and finger to nose testing. Normal heel-to-shin. No ataxia.   Gait: cautious gait. Slightly reduced right armswing. Able to stand on one foot on both sides.   Getting up from seated position without pushing on chair: yes.  Posture: mild stoop.   Romberg: initially refused to close her eyes but eventually was able to do so without falling.      ASSESSMENT / PLAN  #1 Balance difficulties, fear of falling  #2 History of fall with  left orbital roof fracture extending into the left frontal bone and small SAH  #3 myopic degeneration with poor vision  #4 VAL      Ms. Esteban's neurological exam today was unremarkable. There was no compelling signs of neuropathy, cerebellar dysfunction, or parkinsonism that could explain her balance difficulties. We will obtain MRI brain w/wo gd to rule out structural lesion as well as looking for signs of neurodegenerative process. If MRI is unrevealing, I suspect her subjective poor balance is secondary to a combination of impaired vision and anxiety/fear of falling after a major fall in February. She has learned several exercises from PT and I encourage her to continue to do them by herself at home. She should also discuss with  her psychiatrist as she may benefit from cognitive behavioral therapy.      - MRI brain w/wo gd  - Follow-up after MRI brain           ______________________________________      Patient was asked about 14 Review of systems including changes in vision (dry eyes, double vision), hearing, heart, lungs, musculoskeletal, depression, anxiety, snoring, RBD, insomnia, urinary frequency, urinary urgency, constipation, swallowing problems, hematological, ID, allergies, skin problems: seborrhea, endocrinological: thyroid, diabetes, cholesterol; balance, weight changes, and other neurological problems and these were not significant at this time except for   Patient Active Problem List   Diagnosis    Bipolar affective disorder (H)    Other specified hypothyroidism    Prediabetes    Impaired fasting glucose    Other specified glaucoma    Bilateral low back pain without sciatica    Both eyes affected by degenerative myopia with macular hole    Bipolar affective disorder, currently depressed, moderate (H)    Cerebrovascular disease    Closed fracture of left orbit, initial encounter (H)    Dizziness    Fall from standing, initial encounter    Family history of malignant neoplasm of ovary    Fatigue    Fibrocystic disease of breast    VAL (generalized anxiety disorder)    Hyperglycemia    Incoordination    Mixed hyperlipidemia    Hyperlipidemia    Menopausal and postmenopausal disorder    Menopausal syndrome    Open fracture of frontal bone, initial encounter (H)    Other specified symptoms and signs involving the circulatory and respiratory systems    Polyneuropathy    Pure hypercholesterolemia    Traumatic brain injury, without loss of consciousness, initial encounter (H)    SAH (subarachnoid hemorrhage) (H)    Uncomplicated asthma    Gastroesophageal reflux disease without esophagitis    Hypothyroidism, unspecified type    Glaucoma          Allergies   Allergen Reactions    No Clinical Screening - See Comments Other (See  Comments)     Steroids- contraindicated, has glaucoma per pt    Other (Do Not Use)      Other Reaction(s): STEROIDS(SENSITIVITY) - pressure in eyes    Prednisone      Has glaucoma, potential for ocular pressure spike.  She has never been on steroids.    Other Reaction(s): Unknown     Past Surgical History:   Procedure Laterality Date    CATARACT IOL, RT/LT Bilateral     VITRECTOMY ANTERIOR Right 2019    for floaters      Past Medical History:   Diagnosis Date    Bipolar 1 disorder (H)      Social History     Socioeconomic History    Marital status:      Spouse name: Not on file    Number of children: Not on file    Years of education: Not on file    Highest education level: Not on file   Occupational History    Not on file   Tobacco Use    Smoking status: Former     Current packs/day: 0.00     Types: Cigarettes     Quit date: 1997     Years since quittin.8     Passive exposure: Never    Smokeless tobacco: Never   Vaping Use    Vaping status: Never Used   Substance and Sexual Activity    Alcohol use: Yes     Alcohol/week: 4.0 - 5.0 standard drinks of alcohol     Types: 4 - 5 Standard drinks or equivalent per week    Drug use: No    Sexual activity: Not Currently   Other Topics Concern    Not on file   Social History Narrative    Not on file     Social Drivers of Health     Financial Resource Strain: Not on file   Food Insecurity: Not on file   Transportation Needs: Not on file   Physical Activity: Not on file   Stress: Not on file   Social Connections: Not on file   Interpersonal Safety: Not on file   Housing Stability: Not on file       Drug and lactation database from the [place] National Library of Medicine:  http://toxnet.nlm.nih.gov/cgi-bin/sis/htmlgen?LACT      B/P: Data Unavailable, T: Data Unavailable, P: Data Unavailable, R: Data Unavailable 0 lbs 0 oz  not currently breastfeeding., There is no height or weight on file to calculate BMI.  Medications and Vitals not listed above were  documented in the cart and reviewed by me.     Current Outpatient Medications   Medication Sig Dispense Refill    levothyroxine (SYNTHROID/LEVOTHROID) 150 MCG tablet Take 150 mcg by mouth daily.      albuterol (PROAIR HFA/PROVENTIL HFA/VENTOLIN HFA) 108 (90 Base) MCG/ACT inhaler Inhale 2 puffs into the lungs every 6 hours as needed for wheezing or cough 18 g 0    ARIPiprazole (ABILIFY) 5 MG tablet Take 1 tablet (5 mg) by mouth daily 90 tablet 1    atorvastatin (LIPITOR) 20 MG tablet Take 1 tablet (20 mg) by mouth daily 90 tablet 0    benzonatate (TESSALON) 200 MG capsule Take 1 capsule (200 mg) by mouth 3 times daily as needed for cough 30 capsule 0    brimonidine (ALPHAGAN) 0.2 % ophthalmic solution Place 1 drop into both eyes 2 times daily 10 mL 11    dorzolamide-timolol (COSOPT) 2-0.5 % ophthalmic solution INSTILL ONE DROP INTO BOTH EYES TWICE A DAY. Ophthalmic for 45      dorzolamide-timolol (COSOPT) 2-0.5 % ophthalmic solution Place 1 drop into both eyes 2 times daily 10 mL 11    gabapentin (NEURONTIN) 100 MG capsule Take 1 capsule (100 mg) by mouth 2 times daily.      hypromellose (GENTEAL SEVERE) ophthalmic gel 0.3% USE once at night in the eye. Both eyes 10 g 11    lamoTRIgine (LAMICTAL) 150 MG tablet Take 2 tablets (300 mg) by mouth at bedtime 180 tablet 1    levothyroxine (SYNTHROID/LEVOTHROID) 25 MCG tablet Take 1 tablet (25 mcg) by mouth daily 90 tablet 3    meclizine (ANTIVERT) 25 MG tablet TAKE 1 TABLET BY MOUTH 3 TIMES A DAY AS NEEDED FOR DIZZINESS. *MAY CAUSE DROWSINESS* Oral      montelukast (SINGULAIR) 10 MG tablet Take 10 mg by mouth at bedtime.      Multiple Minerals-Vitamins (ADVANCED CALCIUM/D/MAGNESIUM) TABS Take 1,500 mg by mouth daily       multivitamin (OCUVITE) TABS Take 1 tablet by mouth daily  30 each 0    omega 3 1000 MG CAPS Take 3 g by mouth daily  90 capsule     omeprazole-sodium bicarbonate  MG CAPS per capsule       propranolol (INDERAL) 10 MG tablet 1 tablet Oral twice a  day as needed for anxiety for 90      traZODone (DESYREL) 50 MG tablet Take 1-2 tablets ( mg) by mouth nightly as needed for sleep 180 tablet 1         Bruce Rodrigez MD     neurological cause of her balance difficulties, normal pressure hydrocephalus could not be entirely excluded.  I would like to get an opinion from our movement disorder specialist to see if there is any role of large-volume spinal tap.             9/23/2024  [facility] Neurology Clinic [place]  Balance issues     Referred by Dr. Lion     HISTORY OF PRESENT ILLNESS   is a 69 year old female presenting to Neuromuscular Clinic for evaluation of balance issues. Patient is accompanied by her friend, who helped provide collateral history today.     She is following with psychiatry for bipolar disorder and generalized anxiety disorder.  She is currently on aripiprazole, lamotrigine, and trazodone.     She started to have issues with balance 4 years ago.  In 2020, she had 2 episodes where she could not walk straight for a few hours and spontaneously improved.  There was no associating dizziness or nausea or vertigo.  She was evaluated in urgent care and CT head was unremarkable.  Since then she has always felt that her balance has never been good.     In February 2024 (7 months ago), she had a fall while walking up an incline.  This resulted in small inferior left frontal lobe subarachnoid hemorrhage, left orbital roof fracture extending into the left frontal bone.  This was treated conservatively.  Repeat CT scan showed interval resolution of the subarachnoid hemorrhage.  Since then, she has been afraid of falling.  She feels more unsteady walking outside but is able to walk better inside a building.  She has to hold onto objects when walking.  She stops walking her dog as she is afraid of another fall. She denies a sensation of dizziness, lightheadedness, or passing out. No weakness in the legs. She cannot do stairs due to her poor vision as she is not able to see the steps. She also attributes her imbalance to poor vision. She has completed PT. She has learned several exercises but hasn't been  doing this regularly. There is no tremor. No change in hand writing, speech, swallowing. No dream enactment behavior. No hallucinations. No change in her memory.      She has 2 siblings. Her brother has parkinson's disease. No other family member with Parkinson's.     PHYSICAL EXAM  NEUROLOGICAL EXAMINATION  Mental status: normal.  Cranial nerves: normal pursuits and saccades. No nystagmus.   Muscle strength: Normal muscle strength 5/5 proximally and distally in upper and lower limbs.  Extrapyramidal: Normal tone, No rigidity. No tremor. No bradykinesia.   Sensation: Intact sensation to light touch and  vibration  in upper and lower limbs.  Deep tendon reflexes: Normal reflexes in the upper and patella. Mildly reduced bilateral ankle reflexes  Coordination: normal rapid alternating movements and finger to nose testing. Normal heel-to-shin. No ataxia.   Gait: cautious gait. Slightly reduced right armswing. Able to stand on one foot on both sides.   Getting up from seated position without pushing on chair: yes.  Posture: mild stoop.   Romberg: initially refused to close her eyes but eventually was able to do so without falling.      ASSESSMENT / PLAN  #1 Balance difficulties, fear of falling  #2 History of fall with  left orbital roof fracture extending into the left frontal bone and small SAH  #3 myopic degeneration with poor vision  #4 VAL      Ms. Esteban's neurological exam today was unremarkable. There was no compelling signs of neuropathy, cerebellar dysfunction, or parkinsonism that could explain her balance difficulties. We will obtain MRI brain w/wo gd to rule out structural lesion as well as looking for signs of neurodegenerative process. If MRI is unrevealing, I suspect her subjective poor balance is secondary to a combination of impaired vision and anxiety/fear of falling after a major fall in February. She has learned several exercises from PT and I encourage her to continue to do them by herself at home.  She should also discuss with her psychiatrist as she may benefit from cognitive behavioral therapy.      - MRI brain w/wo gd  - Follow-up after MRI brain           ______________________________________      Patient was asked about 14 Review of systems including changes in vision (dry eyes, double vision), hearing, heart, lungs, musculoskeletal, depression, anxiety, snoring, RBD, insomnia, urinary frequency, urinary urgency, constipation, swallowing problems, hematological, ID, allergies, skin problems: seborrhea, endocrinological: thyroid, diabetes, cholesterol; balance, weight changes, and other neurological problems and these were not significant at this time except for   Patient Active Problem List   Diagnosis    Bipolar affective disorder (H)    Other specified hypothyroidism    Prediabetes    Impaired fasting glucose    Other specified glaucoma    Bilateral low back pain without sciatica    Both eyes affected by degenerative myopia with macular hole    Bipolar affective disorder, currently depressed, moderate (H)    Cerebrovascular disease    Closed fracture of left orbit, initial encounter (H)    Dizziness    Fall from standing, initial encounter    Family history of malignant neoplasm of ovary    Fatigue    Fibrocystic disease of breast    VAL (generalized anxiety disorder)    Hyperglycemia    Incoordination    Mixed hyperlipidemia    Hyperlipidemia    Menopausal and postmenopausal disorder    Menopausal syndrome    Open fracture of frontal bone, initial encounter (H)    Other specified symptoms and signs involving the circulatory and respiratory systems    Polyneuropathy    Pure hypercholesterolemia    Traumatic brain injury, without loss of consciousness, initial encounter (H)    SAH (subarachnoid hemorrhage) (H)    Uncomplicated asthma    Gastroesophageal reflux disease without esophagitis    Hypothyroidism, unspecified type    Glaucoma          Allergies   Allergen Reactions    No Clinical Screening -  See Comments Other (See Comments)     Steroids- contraindicated, has glaucoma per pt    Other (Do Not Use)      Other Reaction(s): STEROIDS(SENSITIVITY) - pressure in eyes    Prednisone      Has glaucoma, potential for ocular pressure spike.  She has never been on steroids.    Other Reaction(s): Unknown     Past Surgical History:   Procedure Laterality Date    CATARACT IOL, RT/LT Bilateral     VITRECTOMY ANTERIOR Right 2019    for floaters      Past Medical History:   Diagnosis Date    Bipolar 1 disorder (H)      Social History     Socioeconomic History    Marital status:      Spouse name: Not on file    Number of children: Not on file    Years of education: Not on file    Highest education level: Not on file   Occupational History    Not on file   Tobacco Use    Smoking status: Former     Current packs/day: 0.00     Types: Cigarettes     Quit date: 1997     Years since quittin.8     Passive exposure: Never    Smokeless tobacco: Never   Vaping Use    Vaping status: Never Used   Substance and Sexual Activity    Alcohol use: Yes     Alcohol/week: 4.0 - 5.0 standard drinks of alcohol     Types: 4 - 5 Standard drinks or equivalent per week    Drug use: No    Sexual activity: Not Currently   Other Topics Concern    Not on file   Social History Narrative    Not on file     Social Drivers of Health     Financial Resource Strain: Not on file   Food Insecurity: Not on file   Transportation Needs: Not on file   Physical Activity: Not on file   Stress: Not on file   Social Connections: Not on file   Interpersonal Safety: Not on file   Housing Stability: Not on file       Drug and lactation database from the [place] National Library of Medicine:  http://toxnet.nlm.nih.gov/cgi-bin/sis/htmlgen?LACT      B/P: Data Unavailable, T: Data Unavailable, P: Data Unavailable, R: Data Unavailable 0 lbs 0 oz  not currently breastfeeding., There is no height or weight on file to calculate BMI.  Medications and  Vitals not listed above were documented in the cart and reviewed by me.     Current Outpatient Medications   Medication Sig Dispense Refill    levothyroxine (SYNTHROID/LEVOTHROID) 150 MCG tablet Take 150 mcg by mouth daily.      albuterol (PROAIR HFA/PROVENTIL HFA/VENTOLIN HFA) 108 (90 Base) MCG/ACT inhaler Inhale 2 puffs into the lungs every 6 hours as needed for wheezing or cough 18 g 0    ARIPiprazole (ABILIFY) 5 MG tablet Take 1 tablet (5 mg) by mouth daily 90 tablet 1    atorvastatin (LIPITOR) 20 MG tablet Take 1 tablet (20 mg) by mouth daily 90 tablet 0    benzonatate (TESSALON) 200 MG capsule Take 1 capsule (200 mg) by mouth 3 times daily as needed for cough 30 capsule 0    brimonidine (ALPHAGAN) 0.2 % ophthalmic solution Place 1 drop into both eyes 2 times daily 10 mL 11    dorzolamide-timolol (COSOPT) 2-0.5 % ophthalmic solution INSTILL ONE DROP INTO BOTH EYES TWICE A DAY. Ophthalmic for 45      dorzolamide-timolol (COSOPT) 2-0.5 % ophthalmic solution Place 1 drop into both eyes 2 times daily 10 mL 11    gabapentin (NEURONTIN) 100 MG capsule Take 1 capsule (100 mg) by mouth 2 times daily.      hypromellose (GENTEAL SEVERE) ophthalmic gel 0.3% USE once at night in the eye. Both eyes 10 g 11    lamoTRIgine (LAMICTAL) 150 MG tablet Take 2 tablets (300 mg) by mouth at bedtime 180 tablet 1    levothyroxine (SYNTHROID/LEVOTHROID) 25 MCG tablet Take 1 tablet (25 mcg) by mouth daily 90 tablet 3    meclizine (ANTIVERT) 25 MG tablet TAKE 1 TABLET BY MOUTH 3 TIMES A DAY AS NEEDED FOR DIZZINESS. *MAY CAUSE DROWSINESS* Oral      montelukast (SINGULAIR) 10 MG tablet Take 10 mg by mouth at bedtime.      Multiple Minerals-Vitamins (ADVANCED CALCIUM/D/MAGNESIUM) TABS Take 1,500 mg by mouth daily       multivitamin (OCUVITE) TABS Take 1 tablet by mouth daily  30 each 0    omega 3 1000 MG CAPS Take 3 g by mouth daily  90 capsule     omeprazole-sodium bicarbonate  MG CAPS per capsule       propranolol (INDERAL) 10 MG  tablet 1 tablet Oral twice a day as needed for anxiety for 90      traZODone (DESYREL) 50 MG tablet Take 1-2 tablets ( mg) by mouth nightly as needed for sleep 180 tablet 1         Bruce Rodrigez MD

## 2024-11-06 RX ORDER — LEVETIRACETAM 500 MG/1
TABLET ORAL
COMMUNITY
Start: 2024-02-13 | End: 2024-11-26

## 2024-11-06 NOTE — TELEPHONE ENCOUNTER
RECORDS RECEIVED FROM: Internal    REASON FOR VISIT: Balance problems, Anxiety, Cerebral ventriculomegaly   PROVIDER: Bruce Rodrigez MD   DATE OF APPT: 11/26/24 @ 3:30 pm    NOTES (FOR ALL VISITS) STATUS DETAILS   OFFICE NOTE from referring provider Internal 10/7/24, 9/23/24 Jared Downey MD @[facility]-Neuro     OFFICE NOTE from other specialist Received 7/2/24 Niurka Powell NP @[facility]     MEDICATION LIST Internal    IMAGING  (FOR ALL VISITS)     MRI (HEAD, NECK, SPINE) Internal [facility]  10/3/24 MR Brain     CT (HEAD, NECK, SPINE) Internal [facility]  2/21/24 CT Head  2/12/24 CT Head  2/12/24 CT Head  2/12/24 CT Cervical Spine

## 2024-11-13 ENCOUNTER — OFFICE VISIT (OUTPATIENT)
Dept: OPHTHALMOLOGY | Facility: CLINIC | Age: 69
End: 2024-11-13
Attending: OPHTHALMOLOGY
Payer: MEDICARE

## 2024-11-13 DIAGNOSIS — H04.123 DRY EYE SYNDROME OF BOTH EYES: Primary | ICD-10-CM

## 2024-11-13 DIAGNOSIS — H35.371 EPIRETINAL MEMBRANE (ERM) OF RIGHT EYE: ICD-10-CM

## 2024-11-13 DIAGNOSIS — H16.213 EXPOSURE KERATOCONJUNCTIVITIS OF BOTH EYES: ICD-10-CM

## 2024-11-13 DIAGNOSIS — H40.003 GLAUCOMA SUSPECT OF BOTH EYES: Primary | ICD-10-CM

## 2024-11-13 DIAGNOSIS — H04.123 DRY EYE SYNDROME OF BOTH EYES: ICD-10-CM

## 2024-11-13 DIAGNOSIS — H44.2B3 BOTH EYES AFFECTED BY DEGENERATIVE MYOPIA WITH MACULAR HOLE: ICD-10-CM

## 2024-11-13 PROCEDURE — 92083 EXTENDED VISUAL FIELD XM: CPT | Performed by: OPHTHALMOLOGY

## 2024-11-13 PROCEDURE — G0463 HOSPITAL OUTPT CLINIC VISIT: HCPCS | Performed by: OPHTHALMOLOGY

## 2024-11-13 ASSESSMENT — SLIT LAMP EXAM - LIDS
COMMENTS: MGD

## 2024-11-13 ASSESSMENT — EXTERNAL EXAM - RIGHT EYE
OD_EXAM: NORMAL
OD_EXAM: NORMAL

## 2024-11-13 ASSESSMENT — EXTERNAL EXAM - LEFT EYE
OS_EXAM: NORMAL
OS_EXAM: NORMAL

## 2024-11-13 ASSESSMENT — VISUAL ACUITY
OS_PH_CC: REFUSED
OS_CC: 20/40
METHOD: SNELLEN - LINEAR
OS_CC+: -2
OD_PH_CC: REFUSED
OD_CC: 20/25
OD_CC+: -1

## 2024-11-13 ASSESSMENT — TONOMETRY
OS_IOP_MMHG: 22
IOP_METHOD: TONOPEN
OD_IOP_MMHG: 20
OS_IOP_MMHG: 19
OD_IOP_MMHG: 17
IOP_METHOD: APPLANATION

## 2024-11-13 NOTE — PROGRESS NOTES
CC: Dry Eye each eye     Referring Provider: Dr ANTHONY Montes    HPI:  Emperatriz Esteban is a  68 year old year-old patient with history of glaucoma and cataract surgery.  Moved from [place] May 2021 and she is here to establish eye care. Reports chronic progressive decreased vision both eyes.  She is referred to us from Dr. Montes for evaluation of her dry eyes.  She has not been doing eyelid hygiene.  She has used serum tears in the past and found them helpful.  She has not used them since 2021.  She uses Refresh [place] intermittently.  She has had punctal plugs in the past that were dissolvable which were helpful.     Interval hx 11/13/2024  Chief Complaint(s) and History of Present Illness(es)       Follow Up    Since onset it is stable.  Associated symptoms include eye pain (Pt notes a sore right eye that ached once a couple weeks ago, not concerned now).  Negative for headache, flashes and floaters.  Treatments tried include eye drops.             Comments    Pt reports stable vision, no new concerns. She notes more and trouble getting up curbs and being able to see the curbs. She wanted to make sure that the doctors were aware she had a bad fall in February. She went to the neurologist who told her not to walk outside alone so she doesn't.    Ocular Meds:   Serum Tears (usually gets 3-4x, sometimes gets 6x since they need to be refrigerated)  Does not use Ats    Susan Garsia OA 12:05 PM November 13, 2024                          POHx:  History of myopia age 6 with CL   1997 cataract surgery both eyes   In the past 10 ys diagnosed with glaucoma: cosopt twice a day both eyes and brimonidine twice a day left eye   2016 diagnosis of myopic degeneration - followed by Henok [place] ([place]) (glaucoma). 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   - Patient states serum tears have worked well for her.   Tried ointment and did not like it  Tried cover at night and did not like it  Could not afford restasis    Plan    -continue serum tears 4-6 times a day   - omega-3 2000mg po daily  - Discussed eyelid hygiene measures    Informed the patient that if symptoms persist or worsen she can consider eyelid sx    # history of myopic degeneration  - Shallow posterior staphylomas both eyes  - Retina detachment precautions were discussed with the patient (presence or increased in flashes, floaters or a curtain in the visual field) and was asked to return if any of the those occur     # Primary open angle glaucoma (POAG) both eyes    Advance left eye.   Last gonio: open 360 to CB both eyes   Last pachy:  539/570  Asthma/COPD: No  Steroid Use: No Kidney Stones: No Sulfa Allergy:      No   Tmax: unknown  Today IOP stable  Following with Dr. Al    # partial thickness Macula hole right eye   Stable - observe     # pseudophakic both eyes   Stable     # history of vitrectomy left eye   By Dr. Henok Bolton in [facility]    Follow up Cornea: 1 yearw/ V,T at next visit, call if problems sooner to clinic.  Attending Physician Attestation:  Complete documentation of historical and exam elements from today's encounter can be found in the full encounter summary report (not reduplicated in this progress note).  I personally obtained the chief complaint(s) and history of present illness.  I confirmed and edited as necessary the review of  systems, past medical/surgical history, family history, social history, and examination findings as documented by others; and I examined the patient myself.  I personally reviewed the relevant tests, images, and reports as documented above.  I formulated and edited as necessary the assessment and plan and discussed the findings and management plan with the patient and family. - Jose Ryder MD

## 2024-11-13 NOTE — PROGRESS NOTES
Review of systems for the eyes was negative other than the pertinent positives/negatives listed in the HPI.      Assessment & Plan      Emperatriz Esteban is a 69 year old female with the following diagnoses:   1. Glaucoma suspect of both eyes    2. 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 Dry eye syndrome of both eyes    4. Epiretinal membrane (ERM) of right eye         Here for glaucoma recheck.  Seeing Dr. Ryder today also  S/P Selected laser trabeculoplasty (SLT) left eye 10/2019, right eye 11/2019  (+) family history - father  Gonio: open 360 to CBB both eyes on previous exam (unable to tolerate today)  K pachy:  539/570  Tmax: unknown  Hx of vitrectomy for floaters left eye     IOP today 17/19  LVC visual field: Good reliabilty.  Improved nonspecific defects in both eyes      At goal teens both eyes   Continue brimonidine and Cosopt in each eye         Patient disposition:   Refer to Dr. Bowles for low vision consult  Return in about 9 months (around 8/13/2025) for VT only, OCT NFL, LVC VF.  Sooner if intraocular pressure above goal           Attending Physician Attestation:  Complete documentation of historical and exam elements from today's encounter can be found in the full encounter summary report (not reduplicated in this progress note).  I personally obtained the chief complaint(s) and history of present illness.  I confirmed and edited as necessary the review of systems, past medical/surgical history, family history, social history, and examination findings as documented by others; and I examined the patient myself.  I personally reviewed the relevant tests, images, and reports as documented above.  I formulated and edited as necessary the assessment and plan and discussed the findings and management plan with the patient and family. . - Lul Mirza MD

## 2024-11-13 NOTE — NURSING NOTE
Chief Complaints and History of Present Illnesses   Patient presents with    Follow Up     Chief Complaint(s) and History of Present Illness(es)       Follow Up              Course: stable    Associated symptoms: eye pain (Pt notes a sore right eye that ached once a couple weeks ago, not concerned now).  Negative for headache, flashes and floaters    Treatments tried: eye drops              Comments    Pt reports stable vision, no new concerns. She notes more and trouble getting up curbs and being able to see the curbs. She wanted to make sure that the doctors were aware she had a bad fall in February. She went to the neurologist who told her not to walk outside alone so she doesn't.    Ocular Meds:   Serum Tears (usually gets 3-4x, sometimes gets 6x since they need to be refrigerated)  Does not use Ats    Susan BHAGAT 12:05 PM November 13, 2024

## 2024-11-18 NOTE — NURSING NOTE
Is the patient currently in the state of [place]? YES    Visit mode:VIDEO    If the visit is dropped, the patient can be reconnected by: VIDEO VISIT: Text to cell phone:   Telephone Information:   Mobile [phone]       Will anyone else be joining the visit? NO  (If patient encounters technical issues they should call [phone]    How would you like to obtain your AVS? MyChart    Are changes needed to the allergy or medication list? No    Reason for visit: RECHECK    Emerald CHAVEZ       yes...

## 2024-11-26 ENCOUNTER — PRE VISIT (OUTPATIENT)
Dept: NEUROLOGY | Facility: CLINIC | Age: 69
End: 2024-11-26

## 2024-11-26 ENCOUNTER — OFFICE VISIT (OUTPATIENT)
Dept: NEUROLOGY | Facility: CLINIC | Age: 69
End: 2024-11-26
Attending: STUDENT IN AN ORGANIZED HEALTH CARE EDUCATION/TRAINING PROGRAM
Payer: MEDICARE

## 2024-11-26 VITALS
HEART RATE: 66 BPM | SYSTOLIC BLOOD PRESSURE: 129 MMHG | DIASTOLIC BLOOD PRESSURE: 84 MMHG | RESPIRATION RATE: 16 BRPM | OXYGEN SATURATION: 97 %

## 2024-11-26 DIAGNOSIS — Z86.79 HISTORY OF SUBARACHNOID HEMORRHAGE: ICD-10-CM

## 2024-11-26 DIAGNOSIS — R26.89 BALANCE PROBLEMS: Primary | ICD-10-CM

## 2024-11-26 DIAGNOSIS — G93.89 CEREBRAL VENTRICULOMEGALY: ICD-10-CM

## 2024-11-26 DIAGNOSIS — F41.9 ANXIETY: ICD-10-CM

## 2024-11-26 DIAGNOSIS — Z82.0 FAMILY HISTORY OF PARKINSON DISEASE: ICD-10-CM

## 2024-11-26 RX ORDER — CALCIUM CARBONATE 750 MG/1
750 TABLET, CHEWABLE ORAL DAILY
COMMUNITY

## 2024-11-26 RX ORDER — VITAMIN B COMPLEX
TABLET ORAL DAILY
COMMUNITY

## 2024-11-26 RX ORDER — ASCORBIC ACID 100 MG
TABLET,CHEWABLE ORAL
COMMUNITY

## 2024-11-26 ASSESSMENT — PAIN SCALES - GENERAL: PAINLEVEL_OUTOF10: NO PAIN (0)

## 2024-11-26 NOTE — LETTER
2024       RE: Emperatriz Esteabn  [address]  [facility] [address]     Dear Colleague,    Thank you for referring your patient, Emperatriz Esteban, to the [facility] NEUROLOGY [facility]INIC [facility]. Please see a copy of my visit note below.        Diagnosis/Summary/Recommendations:    PATIENT: Emperatriz Esteban  69 year old female     : 1955    HAROLDO: 2024       MRN: [id number]    [address]   [facility] [address]  Mobile Phone  [phone]  Email  [email or web address]      Yumiko Esteban  Emergency Contact, Cousin  Young Proxy Access  D  Daniela Eulalia  Emergency Contact, Sister  Young Proxy Access       (Home)  [address] [place] [address]    Former (Sep. 28, 2021 - 2024):   (Home)  [address] [place] [address] Emperatriz Esteban [phone] (Home)  [email or web address]       Assessment:  (R26.89) Balance problems  (primary encounter diagnosis)  (Z86.79) History of subarachnoid hemorrhage  Neuropathy  Brother with parkinsonism.    Seen by Dr. Downey virtually 10/7/2024    We discussed the result of the MRI, which showed mild ventriculomegaly.  The finding could suggest normal pressure hydrocephalus.  However, she did not have characteristic magnetic gait on exam.  She does not have cognitive impairment or urinary disturbance.  Secondary NPH in the setting of prior subarachnoid hemorrhage is also possible, but this would not explain her balance difficulties as her symptoms predated the hemorrhage.  The degree of subarachnoid hemorrhage was also very minimal and unlikely to cause CSF flow dysfunction.  Given I have not found any other neurological cause of her balance difficulties, normal pressure hydrocephalus could not be entirely excluded.  I would like to get an opinion from our movement disorder specialist to see if there is any role of large-volume spinal tap.  "    Head CT 2/21/2024  Impression:   1. Interval resolution of left frontal subarachnoid hemorrhage with no new intracranial pathology.   2. Stable left orbital roof and frontal bone fracture with associated left frontal scalp hematoma.     Brain MRI 10/3/2024  1. No acute intracranial pathology.  2. Mild ventriculomegaly out of proportion to the cerebral sulci  suggestive of underlying normal pressure hydrocephalus.      Review of diagnosis    Ms. Esteban presents by herself.     She states she is always afraid of falling  She had a terrible fall last Feb 2024 - brain bleed, cracked skull, bruised face    She states she doesn't feel steady in the world - this preceded fall which is now worse (attributes to fear after her fall).   She gets really unstable picking up her dog's bowl from the floor.   When she walks she weaves   Going up curbs, up/down stairs is a nightmare - she avoid stairs as much as possible.     The only place she feels safe walking is where she lives ([facility] with large [facility]).   She walks with other people, Dr. Downey told her she shouldn't walk by herself.     The first time she felt unsteady was 5-6 years ago. She woke up one morning and couldn't walk a straight line. She has continued to feel off balance since that time. This is really difficult for her to describe - \"I feel like I lose my feet, I almost trip a lot.\" There may be a slight sense of motion associated with this feeling.     When she leans to the side, it will feel like she won't be able to catch herself (but she is able to catch herself)    No falls since February 2024    She had a fall prior to Feb 2024 - one occur a year ago spring 2023. She believes her dog pulled her off balance (miniature poodle). She had a difficult time getting up from that fall. Now she has a  for her dog and walks with them for exercise.     Tingly numbness in her feet - better recently but occasionally comes back at night. " "Mostly in her toes.     When she is outside, she gets exhausted because maintaining her balance feels really hard. Walking around the building she generally feels pretty stable.     She has myopic degeneration and glaucoma - attributes at least some of her difficult with balance to problems with her vision. Her myopic degeneration causes her to see shadows in her vision - vision is distorted. When she looks down she doesn't see a stable surface because there are shadows on it.    No tremor. No slowness or stiffness in her body.     Brother with Parkinson's disease. Diagnosed in his mid 60s. No other family history of movement problems. Her father had a stroke and struggled with balance - she believes around 74-75. Her mother fell down one day and wasn't able to get up (in her mid 80s), she was then transferred to a nursing home.    Reportedly had a small stroke in 2004. \"I talked funny and fell down.\" She was told she had a small stroke. She might have had slightly more difficulty memorizing things after this. Started on atorvastatin.     She has been seen at a [facility]    Avoidance of dopamine blockers   On aripiprazole 5 mg for bipolar disorder   She has been on this for more than 5 years at this dose    Motor complication review   Frequent falls    Review of Impulse control disorders   N/a    Review of surgical or medication options   reviewed    Gait/Balance/Falls   Yes, off balance as above    Exercise/Therapy performed/offered   Walking around her snf community.     She has worked with a really good physical therapist - worked with her over the summer  She has a routine from her PT but does not do this.   She thinks it's good for her legs to be stronger but didn't notice a lot of benefit with regards to balance.    Cognitive/Driving   No concerns about thinking or memory.   Occasionally will forget things like what TV show she was watching the night before - no functional " "impairment.     Not driving - due to vision    Mood   Bipolar affective disorder    Mood has been bad since the election  Outside of this mood has been generally stable on her current regimen.    Aripiprazole 5 mg daily  Lamotrigine 300 mg qpm    Gabapentin for anxiety and neuropathy    Hallucinations/delusions   No hallucinations    Sleep   \"I sleep really well\"     No dream enactment.     Bladder/Renal/Prostate/Gyn/Other  No incontinence, increased urgency or frequency  Stream isn't very strong - attributes to age    GI/Constipation/GERD   Gerd    No constipation    ENDO/Lipid/DM/Bone density/Thyroid  Lipid disoder  Prediabtges  Hyperglycemia  Hypothyroidism  Lipd disorder    Cardio/heart/Hyper or Hypotensive   No cardiac history    Denies orthostatic symptoms.     Vision/Dry Eyes/Cataracts/Glaucoma/Macular   Macular hole bilater  Myopic degeneration  Glaucoma    Glaucoma suspect of both eyes     2. 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 Dry eye syndrome of both eyes    4. Epiretinal membrane (ERM) of right eye      #Dry eye syndrome OU  #exposure keratitis ou    She gets diplopia. She is not sure if it is horizontal or vertical.     Heme/Anticoagulation/Antiplatelet/Anemia/Other  ASA 81 mg daily - but hasn't been taking this for a while because she hasn't been putting it in her pill boxes.     ENT/Resp  Dizziness  Asthma    No changes to voice  Sense of smell is fine.    Skin/Cancer/Seborrhea/other  No history of cancer  No skin changes    Musculoskeletal/Pain/Headache  Cerebrovascular disease  Fatgigue  Incoordination  Closed fracture of orbit  TBI/SAH  Polyneuropathy  Bilateral low back pain   Dizziness     Other:  Menopausal  Fibrocystic breast disease    Family history of ovarian cancer      Bruce Rodrigez MD sent to Bruce Rodrigez MD  Ferritin       Date                     Value               Ref Range           Status               09/01/2016               74            " "      8 - 252 ng/mL       Final            ----------    TSH       Date                     Value               Ref Range           Status               05/26/2023               2.47                0.30 - 4.20 uI*     Final                 12/16/2021               2.14                0.40 - 4.00 mU*     Final                 09/01/2016               2.36                0.40 - 4.00 mU*     Final            ----------    Lab Results       Component                Value               Date                       A1C                      5.4                 05/26/2023                 A1C                      5.6                 07/29/2016                 A1C                      5.4                 10/19/2015                Lab Test        05/26/23 12/16/21                       1201          1417          CHOL         207*         185          HDL          81           80            LDL          116*         93            TRIG         49           61              Lab Results       Component                Value               Date                       WBC                      5.0                 12/16/2021            Lab Results       Component                Value               Date                       RBC                      4.40                12/16/2021            Lab Results       Component                Value               Date                       HGB                      13.9                12/16/2021            Lab Results       Component                Value               Date                       HCT                      42.7                12/16/2021            No components found for: \"MCT\"  Lab Results       Component                Value               Date                       MCV                      97                  12/16/2021            Lab Results       Component                Value               Date                       MCH                      31.6                12/16/2021          "   Lab Results       Component                Value               Date                       MCHC                     32.6                12/16/2021            Lab Results       Component                Value               Date                       RDW                      13.8                12/16/2021            Lab Results       Component                Value               Date                       PLT                      273                 12/16/2021    Medications      AM PM     Albuterol inhaler OFF       Aripiprazole abilify 5mg    1     Atorvastatin lipitor 20mg    1     Benzonatate tessalon 200mg         Brimonidine alphage 0.2% son  1 1     Dorzolamide - timolol cosopt 2 - 0.5%  1 1     Gabapentin neurontin 100mg PRN       Hypromellose genteal ophthal gel         Lamotrigine lamictal 150mg    2     Levothyroxine synthroid 150mcg  OFF       Levothyroxine synthroid 25mcg   1      Meclizine antivert 25mg         Montelukast singulair 10mg         MVI adv sayra/d/mag  1      MVI ocuvite         Omega 3 1000mg   1      Omeprazole sodium bicarbonate 20 - 1100        Propranolol inderal 10mg OFF       Trazodone desyrel 50mg   1               MS: Alert and oriented to person, place, time, and situation.  Speech normal to comprehension and naming.  Recent and remote memory intact.  Attention and concentration normal.  Fund of knowledge normal.    CN:  II: Pupils equal, round, and reactive to light. VFF.  III, IV, VI: Pursuits somewhat choppy. Left eye intorts with downgaze.  V:  Facial sensation intact.  VII: Face symmetric at rest and with activation  VIII: Intact to voice.  IX, X: Palate rise b/l, uvula midline.  No dysarthria.  XI: Trapezius 5/5 bilaterally.  XII: Tongue protrudes midline. No fasciculation or atrophy noted.    Motor:  Normal bulk throughout upper and lower extremities. Paratonia in BUE withslightly increased tone in RUE. Normal tone in BLE. No rest, postural or kinetic tremor. Normal finger  taps, hand open close, pronation supination, heel taps. Slightly irregular toe taps bilaterally.    Mouth, tongue dyskinesias with motor testing.     R/L  Shoulder abd      5/5  Elbow flex  5/5  Elbow ext  5/5     5/5  IO   5/5    Hip flex  5/5  Knee flex  5/5  Knee ext  5/5  Dorsiflex  5/5  Plantar flex  5/5    Reflexes:  R/L  Biceps   2+/2+  Brachioradialis 2+/2+  Patellar  2+/2+  Achilles  Trace/trace  No clonus.  No frontal release signs.    Sensation:  Intact to LT in all extremities. Reduced vibration at bilateral medial malleoli (6/7s)    Coordination:  FTN bilaterally.    Gait:  Reduced arm swing R > L. Normal base, hesitant gait. Not magnetic. Normal tone.    Plan:    Multifactorial gait disturbance - vision impairment, suspect mild neuropathy, mild parkinsonism (possibly drug induced from Abilify) and other possible contributions. She also has eye movement abnormalities that are difficult to characterize. She has had a superior orbital fracture from prior fall and has an extensive ophthalmologic history which could be contributing to this.     Her history and examination today do not raise any concern for normal pressure hydrocephalus. There is no additional testing on this front that we would recommend at this time.     She reports upcoming appointment with neuro-ophthalmology. On review, it appears she has an appointment with Ashley Bowles, optometry. We have sent a message to review our concerns. While her imbalance is very difficult for her to describe, she does endorse some possible sense of motion (? rocking rather than classic vertigo). Placed a referral to our neuro-otologist, Dr. Candelario for further evaluation.    She underwent MRI brain which did not reveal clear structural explanation of her symptoms. We could consider getting a 7 mark MRI of the brain in the future for better evaluation of the brainstem in particular. We will defer this for the time being.     - Reviewed multifactorial gait  disturbance  - Agree with evaluation by neuroophthalmology (she reports she has been referred to them)   - Messaged Ashley Bowles, OD to discuss   - Ongoing management of low vision by ophthalmology team  - Discussed reducing or considering alternatives to aripiprazole, recommend discussing with psychiatry  - Physical therapy referral to work with our neurology PT team, discuss appropriate assistive devices  - Referral to Dr. Candelario, neuro-otology  - Consider 7T MRI brain in the future     Follow up in 5-6 months    Ms. Esteban was seen and discussed with movement disorder attending, Dr. Rodrigez.     Alejandra Jacobo MD  Movement Disorders Fellow    Time Spent: 65 minutes spent on the date of the encounter doing chart review, history and exam, documentation and further activities as noted above    Billing on Complexity  Reviewed brain mri by me - looking at levin ratio and callosal angle and clinical evaluation dont quite fit a diagnosis of NPH  Additional labs reviewed by me. Decision  not to hospitalize for a lumbar drain for evaluation for NPH. Reviewed abilify and encouraged a dose reduction if possible. Will set up a followup after further evaluation by neuro ophthalmology as suspect a combination of changes from the orbital fracture and possible brainstem injury. May need 7T brain mri depending on workup. Encouraged physical therapy and use of cane due to high risk of falling   Bruce Rodrigez MD  November 26, 2024        Coding statement:   Medical Decision Making:  #  Chronic progressive medical conditions addressed  - see above --   Review and/or interpretation of unique test or documentation from a provider outside of neurology yes   Independent historian provided additional details  no I  Prescription drug management and review of potential side effects and/or monitoring for side effects  -- see above ---  Health impacted by social determinants of health  no    I have reviewed the note as documented above.  This  accurately captures the substance of my conversation with the patient and total time spent preparing for visit, executing visit and completing visit on the day of the visit:  65 minutes.  The portion of this total time included face to face time     The longitudinal plan of care for Emperatriz Esteban was addressed during this visit. Due to the added complexity in care, I will continue to support Emperatriz Esteban in the subsequent management of this condition(s) and with the ongoing continuity of care of this condition(s).      Bruce Rodrigez MD     ______________________________________    Last visit date and details:             10/7/2024  [facility] [place]  CHIEF COMPLAINT / REASON FOR VISIT  Eulalia returned to discuss the results of the tests and evaluations.   Consult conducted via real-time audio/video technology by Jared Downey M.D. in [facility] Neurology clinic to the patient at home.     MRI brain w/wo gd did not show intracranial pathology but there was mild ventriculomegaly out of proportion to the cerebral sulci. This was interpreted as suggestive of normal pressure hydrocephalus.      Her symptoms have not changed since prior visit     The following portions of the patient's history were reviewed and updated as appropriate: allergies, current medications, family history, medical history, surgical history, social history, and problem list.      ASSESSMENT / PLAN   #1 Balance difficulties, fear of falling  #2 History of fall with  left orbital roof fracture extending into the left frontal bone and small SAH  #3 Mild ventriculomegaly on MRI  #4 myopic degeneration with poor vision  #5 VAL     We discussed the result of the MRI, which showed mild ventriculomegaly.  The finding could suggest normal pressure hydrocephalus.  However, she did not have characteristic magnetic gait on exam.  She does not have cognitive impairment or urinary disturbance.  Secondary NPH  in the setting of prior subarachnoid hemorrhage is also possible, but this would not explain her balance difficulties as her symptoms predated the hemorrhage.  The degree of subarachnoid hemorrhage was also very minimal and unlikely to cause CSF flow dysfunction.  Given I have not found any other neurological cause of her balance difficulties, normal pressure hydrocephalus could not be entirely excluded.  I would like to get an opinion from our movement disorder specialist to see if there is any role of large-volume spinal tap.             9/23/2024  [facility] Neurology Clinic [place]  Balance issues     Referred by Dr. Lion     HISTORY OF PRESENT ILLNESS   is a 69 year old female presenting to Neuromuscular Clinic for evaluation of balance issues. Patient is accompanied by her friend, who helped provide collateral history today.     She is following with psychiatry for bipolar disorder and generalized anxiety disorder.  She is currently on aripiprazole, lamotrigine, and trazodone.     She started to have issues with balance 4 years ago.  In 2020, she had 2 episodes where she could not walk straight for a few hours and spontaneously improved.  There was no associating dizziness or nausea or vertigo.  She was evaluated in urgent care and CT head was unremarkable.  Since then she has always felt that her balance has never been good.     In February 2024 (7 months ago), she had a fall while walking up an incline.  This resulted in small inferior left frontal lobe subarachnoid hemorrhage, left orbital roof fracture extending into the left frontal bone.  This was treated conservatively.  Repeat CT scan showed interval resolution of the subarachnoid hemorrhage.  Since then, she has been afraid of falling.  She feels more unsteady walking outside but is able to walk better inside a building.  She has to hold onto objects when walking.  She stops walking her dog as she is afraid of another fall. She  denies a sensation of dizziness, lightheadedness, or passing out. No weakness in the legs. She cannot do stairs due to her poor vision as she is not able to see the steps. She also attributes her imbalance to poor vision. She has completed PT. She has learned several exercises but hasn't been doing this regularly. There is no tremor. No change in hand writing, speech, swallowing. No dream enactment behavior. No hallucinations. No change in her memory.      She has 2 siblings. Her brother has parkinson's disease. No other family member with Parkinson's.     PHYSICAL EXAM  NEUROLOGICAL EXAMINATION  Mental status: normal.  Cranial nerves: normal pursuits and saccades. No nystagmus.   Muscle strength: Normal muscle strength 5/5 proximally and distally in upper and lower limbs.  Extrapyramidal: Normal tone, No rigidity. No tremor. No bradykinesia.   Sensation: Intact sensation to light touch and  vibration  in upper and lower limbs.  Deep tendon reflexes: Normal reflexes in the upper and patella. Mildly reduced bilateral ankle reflexes  Coordination: normal rapid alternating movements and finger to nose testing. Normal heel-to-shin. No ataxia.   Gait: cautious gait. Slightly reduced right armswing. Able to stand on one foot on both sides.   Getting up from seated position without pushing on chair: yes.  Posture: mild stoop.   Romberg: initially refused to close her eyes but eventually was able to do so without falling.      ASSESSMENT / PLAN  #1 Balance difficulties, fear of falling  #2 History of fall with  left orbital roof fracture extending into the left frontal bone and small SAH  #3 myopic degeneration with poor vision  #4 VAL      Ms. Esteban's neurological exam today was unremarkable. There was no compelling signs of neuropathy, cerebellar dysfunction, or parkinsonism that could explain her balance difficulties. We will obtain MRI brain w/wo gd to rule out structural lesion as well as looking for signs of  neurodegenerative process. If MRI is unrevealing, I suspect her subjective poor balance is secondary to a combination of impaired vision and anxiety/fear of falling after a major fall in February. She has learned several exercises from PT and I encourage her to continue to do them by herself at home. She should also discuss with her psychiatrist as she may benefit from cognitive behavioral therapy.      - MRI brain w/wo gd  - Follow-up after MRI brain           ______________________________________      Patient was asked about 14 Review of systems including changes in vision (dry eyes, double vision), hearing, heart, lungs, musculoskeletal, depression, anxiety, snoring, RBD, insomnia, urinary frequency, urinary urgency, constipation, swallowing problems, hematological, ID, allergies, skin problems: seborrhea, endocrinological: thyroid, diabetes, cholesterol; balance, weight changes, and other neurological problems and these were not significant at this time except for   Patient Active Problem List   Diagnosis    Bipolar affective disorder (H)    Other specified hypothyroidism    Prediabetes    Impaired fasting glucose    Other specified glaucoma    Bilateral low back pain without sciatica    Both eyes affected by degenerative myopia with macular hole    Bipolar affective disorder, currently depressed, moderate (H)    Cerebrovascular disease    Closed fracture of left orbit, initial encounter (H)    Dizziness    Fall from standing, initial encounter    Family history of malignant neoplasm of ovary    Fatigue    Fibrocystic disease of breast    VAL (generalized anxiety disorder)    Hyperglycemia    Incoordination    Mixed hyperlipidemia    Hyperlipidemia    Menopausal and postmenopausal disorder    Menopausal syndrome    Open fracture of frontal bone, initial encounter (H)    Other specified symptoms and signs involving the circulatory and respiratory systems    Polyneuropathy    Pure hypercholesterolemia     Traumatic brain injury, without loss of consciousness, initial encounter (H)    SAH (subarachnoid hemorrhage) (H)    Uncomplicated asthma    Gastroesophageal reflux disease without esophagitis    Hypothyroidism, unspecified type    Glaucoma          Allergies   Allergen Reactions    No Clinical Screening - See Comments Other (See Comments)     Steroids- contraindicated, has glaucoma per pt    Other (Do Not Use)      Other Reaction(s): STEROIDS(SENSITIVITY) - pressure in eyes    Prednisone      Has glaucoma, potential for ocular pressure spike.  She has never been on steroids.    Other Reaction(s): Unknown     Past Surgical History:   Procedure Laterality Date    CATARACT IOL, RT/LT Bilateral     VITRECTOMY ANTERIOR Right 2019    for floaters      Past Medical History:   Diagnosis Date    Bipolar 1 disorder (H)      Social History     Socioeconomic History    Marital status:      Spouse name: Not on file    Number of children: Not on file    Years of education: Not on file    Highest education level: Not on file   Occupational History    Not on file   Tobacco Use    Smoking status: Former     Current packs/day: 0.00     Types: Cigarettes     Quit date: 1997     Years since quittin.8     Passive exposure: Never    Smokeless tobacco: Never   Vaping Use    Vaping status: Never Used   Substance and Sexual Activity    Alcohol use: Yes     Alcohol/week: 4.0 - 5.0 standard drinks of alcohol     Types: 4 - 5 Standard drinks or equivalent per week    Drug use: No    Sexual activity: Not Currently   Other Topics Concern    Not on file   Social History Narrative    Not on file     Social Drivers of Health     Financial Resource Strain: Not on file   Food Insecurity: Not on file   Transportation Needs: Not on file   Physical Activity: Not on file   Stress: Not on file   Social Connections: Not on file   Interpersonal Safety: Not on file   Housing Stability: Not on file       Drug and lactation database from  the [place] [employer]:  http://toxnet.nlm.nih.gov/cgi-bin/sis/htmlgen?LACT      B/P: Data Unavailable, T: Data Unavailable, P: Data Unavailable, R: Data Unavailable 0 lbs 0 oz  not currently breastfeeding., There is no height or weight on file to calculate BMI.  Medications and Vitals not listed above were documented in the cart and reviewed by me.     Current Outpatient Medications   Medication Sig Dispense Refill    levothyroxine (SYNTHROID/LEVOTHROID) 150 MCG tablet Take 150 mcg by mouth daily.      albuterol (PROAIR HFA/PROVENTIL HFA/VENTOLIN HFA) 108 (90 Base) MCG/ACT inhaler Inhale 2 puffs into the lungs every 6 hours as needed for wheezing or cough 18 g 0    ARIPiprazole (ABILIFY) 5 MG tablet Take 1 tablet (5 mg) by mouth daily 90 tablet 1    atorvastatin (LIPITOR) 20 MG tablet Take 1 tablet (20 mg) by mouth daily 90 tablet 0    benzonatate (TESSALON) 200 MG capsule Take 1 capsule (200 mg) by mouth 3 times daily as needed for cough 30 capsule 0    brimonidine (ALPHAGAN) 0.2 % ophthalmic solution Place 1 drop into both eyes 2 times daily 10 mL 11    dorzolamide-timolol (COSOPT) 2-0.5 % ophthalmic solution INSTILL ONE DROP INTO BOTH EYES TWICE A DAY. Ophthalmic for 45      dorzolamide-timolol (COSOPT) 2-0.5 % ophthalmic solution Place 1 drop into both eyes 2 times daily 10 mL 11    gabapentin (NEURONTIN) 100 MG capsule Take 1 capsule (100 mg) by mouth 2 times daily.      hypromellose (GENTEAL SEVERE) ophthalmic gel 0.3% USE once at night in the eye. Both eyes 10 g 11    lamoTRIgine (LAMICTAL) 150 MG tablet Take 2 tablets (300 mg) by mouth at bedtime 180 tablet 1    levothyroxine (SYNTHROID/LEVOTHROID) 25 MCG tablet Take 1 tablet (25 mcg) by mouth daily 90 tablet 3    meclizine (ANTIVERT) 25 MG tablet TAKE 1 TABLET BY MOUTH 3 TIMES A DAY AS NEEDED FOR DIZZINESS. *MAY CAUSE DROWSINESS* Oral      montelukast (SINGULAIR) 10 MG tablet Take 10 mg by mouth at bedtime.      Multiple  Minerals-Vitamins (ADVANCED CALCIUM/D/MAGNESIUM) TABS Take 1,500 mg by mouth daily       multivitamin (OCUVITE) TABS Take 1 tablet by mouth daily  30 each 0    omega 3 1000 MG CAPS Take 3 g by mouth daily  90 capsule     omeprazole-sodium bicarbonate  MG CAPS per capsule       propranolol (INDERAL) 10 MG tablet 1 tablet Oral twice a day as needed for anxiety for 90      traZODone (DESYREL) 50 MG tablet Take 1-2 tablets ( mg) by mouth nightly as needed for sleep 180 tablet 1         Again, thank you for allowing me to participate in the care of your patient.      Sincerely,    Bruce Rodrigez MD

## 2024-11-26 NOTE — PATIENT INSTRUCTIONS
Your difficulty with walking is likely the result of several issues. Low vision can certainly make balance difficult. The tingling feeling in your feet could be related to a neuropathy which makes it harder for you to tell where your feet are in space.     Aripiprazole (Abilify) can cause parkinsonism that can affect your walking and balance. You could consider working with your psychiatrist on lowering this dose or considering alternatives.     We have placed a referral to work with our physical therapists. They can work with you on safe assistive devices.     We have also placed a referral to see our neuro-otologist, Dr. Candelario. She specialized in balance problems particularly related to the inner ear. We would like her to see you as well.     You have some eye movement abnormalities on exam today. We will message the optometrist that you will be seeing in December as we would like you to get these evaluated by our eye specialists.

## 2024-12-02 ENCOUNTER — MYC MEDICAL ADVICE (OUTPATIENT)
Dept: NEUROLOGY | Facility: CLINIC | Age: 69
End: 2024-12-02
Payer: MEDICARE

## 2024-12-11 DIAGNOSIS — H35.371 EPIRETINAL MEMBRANE (ERM) OF RIGHT EYE: Primary | ICD-10-CM

## 2024-12-18 ENCOUNTER — OFFICE VISIT (OUTPATIENT)
Dept: OPHTHALMOLOGY | Facility: CLINIC | Age: 69
End: 2024-12-18
Attending: OPHTHALMOLOGY
Payer: MEDICARE

## 2024-12-18 DIAGNOSIS — H35.371 EPIRETINAL MEMBRANE (ERM) OF RIGHT EYE: ICD-10-CM

## 2024-12-18 DIAGNOSIS — Z96.1 PSEUDOPHAKIA: ICD-10-CM

## 2024-12-18 DIAGNOSIS — H35.341 LAMELLAR MACULAR HOLE OF RIGHT EYE: Primary | ICD-10-CM

## 2024-12-18 DIAGNOSIS — H04.123 DRY EYE SYNDROME OF BOTH EYES: ICD-10-CM

## 2024-12-18 DIAGNOSIS — H40.003 GLAUCOMA SUSPECT OF BOTH EYES: ICD-10-CM

## 2024-12-18 DIAGNOSIS — H52.13 HIGH MYOPIA, BILATERAL: ICD-10-CM

## 2024-12-18 PROCEDURE — G0463 HOSPITAL OUTPT CLINIC VISIT: HCPCS | Performed by: OPHTHALMOLOGY

## 2024-12-18 PROCEDURE — 92134 CPTRZ OPH DX IMG PST SGM RTA: CPT | Performed by: OPHTHALMOLOGY

## 2024-12-18 PROCEDURE — 92250 FUNDUS PHOTOGRAPHY W/I&R: CPT | Performed by: OPHTHALMOLOGY

## 2024-12-18 ASSESSMENT — VISUAL ACUITY
OS_CC: 20/40
OD_CC+: -2
METHOD: SNELLEN - LINEAR
OS_PH_CC: DECLINED
OD_CC: 20/25

## 2024-12-18 ASSESSMENT — CONF VISUAL FIELD
OD_NORMAL: 1
OS_SUPERIOR_TEMPORAL_RESTRICTION: 0
OD_SUPERIOR_NASAL_RESTRICTION: 0
OS_INFERIOR_NASAL_RESTRICTION: 0
OD_INFERIOR_TEMPORAL_RESTRICTION: 0
METHOD: COUNTING FINGERS
OS_NORMAL: 1
OS_INFERIOR_TEMPORAL_RESTRICTION: 0
OS_SUPERIOR_NASAL_RESTRICTION: 0
OD_SUPERIOR_TEMPORAL_RESTRICTION: 0
OD_INFERIOR_NASAL_RESTRICTION: 0

## 2024-12-18 ASSESSMENT — SLIT LAMP EXAM - LIDS
COMMENTS: MGD
COMMENTS: MGD

## 2024-12-18 ASSESSMENT — PACHYMETRY
OS_CT(UM): 570
OD_CT(UM): 539

## 2024-12-18 ASSESSMENT — REFRACTION_WEARINGRX
SPECS_TYPE: PAL
OS_SPHERE: -6.25
OS_CYLINDER: +1.50
OS_ADD: +2.50
OD_CYLINDER: +1.00
OD_SPHERE: -4.50
OD_AXIS: 169
OD_ADD: +2.50
OS_AXIS: 150

## 2024-12-18 ASSESSMENT — CUP TO DISC RATIO
OD_RATIO: 0.3
OS_RATIO: 0.4

## 2024-12-18 ASSESSMENT — EXTERNAL EXAM - RIGHT EYE: OD_EXAM: NORMAL

## 2024-12-18 ASSESSMENT — TONOMETRY
OD_IOP_MMHG: 15
OS_IOP_MMHG: 16
IOP_METHOD: TONOPEN

## 2024-12-18 ASSESSMENT — EXTERNAL EXAM - LEFT EYE: OS_EXAM: NORMAL

## 2024-12-18 NOTE — PROGRESS NOTES
CC -   Retina follow up    INTERVAL HISTORY - Initial visit    HPI -   Emperatriz Esteban is a  69 year old patient presenting for follow up of her lamellar macular hole left eye. She follows with Dr. Mirza for glaucoma and Dr. Ryder for dry eye. Patient was seen at [facility] for orbital floor fracture and traumatic iritis left eye.     History of Retinal Dystrophy:  Photosensitivity: No  Nyctalopia: No  Problems with steps, curbs, or stairs: Yes  Problems going from bright light to inside: Yes  Problems with color vision:No  Sees flashing lights: No  Objects/people jump into view: No    PAST OCULAR SURGERY  CEIOL implantation   PPV for fluoterectomy left eye       RETINAL IMAGING:  [facility]   Right eye: Clear media, optic disc is flat with PPA (myopic degeneration). C/D ratio 0.1. Macula appears flat. Vessels are normal. Periphery with 360 degrees laser scars.   Left eye:  Clear media, optic disc is flat with PPA (myopic degeneration). C/D ratio 0.4. Macula appears flat. Vessels are normal. Periphery with 360 degrees laser scars.     OCT 12/18/24    Right eye:  Abnormal foveal contour with lamellar hole, subfoveal retinal atrophy.Thin choroid. No IRF or SRF. PVD.   Left eye: Irregular foveal contour. No IRF or SRF. Thin choroid.       ASSESSMENT & PLAN  # Myopic degeneration  - No retinal holes or breaks seen on fundus exam today  - RD warning signs explained   - patient knows to come in if any change in symptoms     # Lamellar hole right eye   -Stable compared to previous scans  - observe      # Primary open angle glaucoma (POAG) both eyes    Managed with topical drops  Follows with Dr. Mirza     # history of Dry eye syndrome   - Follows with Dr. Ryder      # Pseudophakic both eyes   - Stable     # history of vitrectomy left eye   - S/P floaterectomy  By Dr. Henok Bolton in [facility]       Return to clinic: with retina if needed; Retina detachment precautions were discussed with the patient (presence or increased in  flashes, floaters or a curtain in the visual field) and was asked to return if any of the those occur      Manuel Coe MD  PGY-6 Vitreoretinal Surgery Fellow  [facility]    Complete documentation of historical and exam elements from today's encounter can be found in the full encounter summary report (not reduplicated in this progress note). I personally obtained the chief complaint(s) and history of present illness.  I confirmed and edited as necessary the review of systems, past medical/surgical history, family history, social history, and examination findings as documented by others; and I examined the patient myself. I personally reviewed the relevant tests, images, and reports as documented above. I formulated and edited as necessary the assessment and plan and discussed the findings and management plan with the patient and family.     Mihai Li MD, PhD

## 2024-12-18 NOTE — NURSING NOTE
Chief Complaints and History of Present Illnesses   Patient presents with    Epiretinal Membrane Evaluation     Chief Complaint(s) and History of Present Illness(es)       Epiretinal Membrane Evaluation              Laterality: right eye    Associated symptoms: dryness.  Negative for eye pain, flashes, floaters, itching and burning    Pain scale: 0/10              Comments    Eulalia is here new to this provider, and referred by Dr Pola Matthews for evaluation of epi-retinal membrane of right eye. She feels vision is stable.

## 2025-02-09 PROBLEM — R05.9 COUGH: Status: ACTIVE | Noted: 2024-05-28

## 2025-02-09 PROBLEM — E78.5 HYPERLIPIDEMIA: Status: ACTIVE | Noted: 2019-04-19

## 2025-02-09 PROBLEM — Z86.79 HISTORY OF SUBARACHNOID HEMORRHAGE: Status: ACTIVE | Noted: 2024-12-01

## 2025-02-09 PROBLEM — J98.8 CONGESTION OF RESPIRATORY TRACT: Status: ACTIVE | Noted: 2024-05-28

## 2025-02-09 PROBLEM — R25.1 TREMOR: Status: ACTIVE | Noted: 2024-03-05

## 2025-02-09 PROBLEM — F40.298 FEAR OF FALLING: Status: ACTIVE | Noted: 2024-03-05

## 2025-02-09 PROBLEM — Z91.81 HISTORY OF FALL: Status: ACTIVE | Noted: 2024-03-05

## 2025-02-09 PROBLEM — Z86.73 HISTORY OF CEREBROVASCULAR ACCIDENT: Status: ACTIVE | Noted: 2024-03-20

## 2025-02-09 PROBLEM — R26.89 IMPAIRMENT OF BALANCE: Status: ACTIVE | Noted: 2024-03-05

## 2025-02-09 PROBLEM — G93.89 CEREBRAL VENTRICULOMEGALY: Status: ACTIVE | Noted: 2024-12-01

## 2025-02-09 PROBLEM — Z87.09 HISTORY OF BRONCHITIS: Status: ACTIVE | Noted: 2024-05-28

## 2025-02-09 PROBLEM — H44.20 DEGENERATIVE PROGRESSIVE HIGH MYOPIA: Status: ACTIVE | Noted: 2024-03-05

## 2025-02-09 PROBLEM — I61.9 CEREBRAL HEMORRHAGE (H): Status: ACTIVE | Noted: 2024-03-05

## 2025-02-09 PROBLEM — S06.9X0A TRAUMATIC BRAIN INJURY WITHOUT LOSS OF CONSCIOUSNESS (H): Status: ACTIVE | Noted: 2024-03-07

## 2025-02-09 PROBLEM — M54.50 LOW BACK PAIN: Status: ACTIVE | Noted: 2023-07-10

## 2025-02-09 PROBLEM — R53.81 DISUSE SYNDROME: Status: ACTIVE | Noted: 2024-03-20

## 2025-02-09 NOTE — PROGRESS NOTES
Diagnosis/Summary/Recommendations:    PATIENT: Emperatriz Esteban  69 year old female     : 1955    HAROLDO: Mar 11, 2025     MRN: [id number]  [address]   [facility] [address]  Mobile Phone  [phone]  Email  [email or web address]       Yumiko Eulalia  Emergency Contact, Cousin  Young Proxy Access  D  Daniela Eulalia  Emergency Contact, Sister  Young Proxy Access         (Home)  [address] [place] [address]    Former (Sep. 28, 2021 - 2024):   (Home)  [address] [place] [address] Emperatriz Esteban [phone] (Home)  [email or web address]         Assessment:  (R26.89) Balance problems  (primary encounter diagnosis)  (Z86.79) History of subarachnoid hemorrhage  Neuropathy  Brother with parkinsonism.     Seen by Dr. Downey virtually 10/7/2024     We discussed the result of the MRI, which showed mild ventriculomegaly.  The finding could suggest normal pressure hydrocephalus.  However, she did not have characteristic magnetic gait on exam.  She does not have cognitive impairment or urinary disturbance.  Secondary NPH in the setting of prior subarachnoid hemorrhage is also possible, but this would not explain her balance difficulties as her symptoms predated the hemorrhage.  The degree of subarachnoid hemorrhage was also very minimal and unlikely to cause CSF flow dysfunction.  Given I have not found any other neurological cause of her balance difficulties, normal pressure hydrocephalus could not be entirely excluded.  I would like to get an opinion from our movement disorder specialist to see if there is any role of large-volume spinal tap.      Head CT 2024  Impression:   1. Interval resolution of left frontal subarachnoid hemorrhage with no new intracranial pathology.   2. Stable left orbital roof and frontal bone fracture with associated left frontal scalp hematoma.      Brain MRI 10/3/2024  1. No acute intracranial pathology.  2. Mild ventriculomegaly  "out of proportion to the cerebral sulci  suggestive of underlying normal pressure hydrocephalus.        Review of diagnosis    Ms. Esteban presents by herself.      She states she is always afraid of falling  She had a terrible fall last Feb 2024 - brain bleed, cracked skull, bruised face     She states she doesn't feel steady in the world - this preceded fall which is now worse (attributes to fear after her fall).   She gets really unstable picking up her dog's bowl from the floor.   When she walks she weaves   Going up curbs, up/down stairs is a nightmare - she avoid stairs as much as possible.      The only place she feels safe walking is where she lives ([facility] with large atrium).   She walks with other people, Dr. Downey told her she shouldn't walk by herself.      The first time she felt unsteady was 5-6 years ago. She woke up one morning and couldn't walk a straight line. She has continued to feel off balance since that time. This is really difficult for her to describe - \"I feel like I lose my feet, I almost trip a lot.\" There may be a slight sense of motion associated with this feeling.      When she leans to the side, it will feel like she won't be able to catch herself (but she is able to catch herself)     No falls since February 2024     She had a fall prior to Feb 2024 - one occur a year ago spring 2023. She believes her dog pulled her off balance (miniature poodle). She had a difficult time getting up from that fall. Now she has a  for her dog and walks with them for exercise.      Tingly numbness in her feet - better recently but occasionally comes back at night. Mostly in her toes.      When she is outside, she gets exhausted because maintaining her balance feels really hard. Walking around the building she generally feels pretty stable.      She has myopic degeneration and glaucoma - attributes at least some of her difficult with balance to problems with her vision. Her " "myopic degeneration causes her to see shadows in her vision - vision is distorted. When she looks down she doesn't see a stable surface because there are shadows on it.     No tremor. No slowness or stiffness in her body.      Brother with Parkinson's disease. Diagnosed in his mid 60s. No other family history of movement problems. Her father had a stroke and struggled with balance - she believes around 74-75. Her mother fell down one day and wasn't able to get up (in her mid 80s), she was then transferred to a nursing home.     Reportedly had a small stroke in 2004. \"I talked funny and fell down.\" She was told she had a small stroke. She might have had slightly more difficulty memorizing things after this. Started on atorvastatin.      She has been seen at a [facility]     Avoidance of dopamine blockers   On aripiprazole 5 mg for bipolar disorder               She has been on this for more than 5 years at this dose     Motor complication review   Frequent falls     Review of Impulse control disorders   N/a     Review of surgical or medication options   reviewed     Gait/Balance/Falls   Yes, off balance as above     Exercise/Therapy performed/offered   Walking around her custodial community.      She has worked with a really good physical therapist - worked with her over the summer  She has a routine from her PT but does not do this.   She thinks it's good for her legs to be stronger but didn't notice a lot of benefit with regards to balance.     Cognitive/Driving   No concerns about thinking or memory.   Occasionally will forget things like what TV show she was watching the night before - no functional impairment.      Not driving - due to vision     Mood   Bipolar affective disorder     Mood has been bad since the election  Outside of this mood has been generally stable on her current regimen.     Aripiprazole 5 mg daily  Lamotrigine 300 mg qpm     Gabapentin for anxiety and neuropathy   " "  Hallucinations/delusions   No hallucinations     Sleep   \"I sleep really well\"      No dream enactment.      Bladder/Renal/Prostate/Gyn/Other  No incontinence, increased urgency or frequency  Stream isn't very strong - attributes to age     GI/Constipation/GERD   Gerd     No constipation     ENDO/Lipid/DM/Bone density/Thyroid  Lipid disoder  Prediabtges  Hyperglycemia  Hypothyroidism  Lipd disorder     Cardio/heart/Hyper or Hypotensive   No cardiac history     Denies orthostatic symptoms.      Vision/Dry Eyes/Cataracts/Glaucoma/Macular   Macular hole bilater  Myopic degeneration  Glaucoma     Glaucoma suspect of both eyes      2. 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 Dry eye syndrome of both eyes    4. Epiretinal membrane (ERM) of right eye       #Dry eye syndrome OU  #exposure keratitis ou     She gets diplopia. She is not sure if it is horizontal or vertical.      Heme/Anticoagulation/Antiplatelet/Anemia/Other  ASA 81 mg daily - but hasn't been taking this for a while because she hasn't been putting it in her pill boxes.      ENT/Resp  Dizziness  Asthma     No changes to voice  Sense of smell is fine.     Skin/Cancer/Seborrhea/other  No history of cancer  No skin changes     Musculoskeletal/Pain/Headache  Cerebrovascular disease  Fatgigue  Incoordination  Closed fracture of orbit  TBI/SAH  Polyneuropathy  Bilateral low back pain   Dizziness      Other:  Menopausal  Fibrocystic breast disease     Family history of ovarian cancer     Multifactorial gait disturbance - vision impairment, suspect mild neuropathy, mild parkinsonism (possibly drug induced from Abilify) and other possible contributions. She also has eye movement abnormalities that are difficult to characterize. She has had a superior orbital fracture from prior fall and has an extensive ophthalmologic history which could be contributing to this.      Her history and examination today do not raise any concern for normal " pressure hydrocephalus. There is no additional testing on this front that we would recommend at this time.      She reports upcoming appointment with neuro-ophthalmology. On review, it appears she has an appointment with Ashley Bowles, optometry. We have sent a message to review our concerns.     While her imbalance is very difficult for her to describe, she does endorse some possible sense of motion (? rocking rather than classic vertigo). Placed a referral to our neuro-otologist, Dr. Candelario for further evaluation.     She underwent MRI brain which did not reveal clear structural explanation of her symptoms. We could consider getting a 7 mark MRI of the brain in the future for better evaluation of the brainstem in particular. We will defer this for the time being.      - Reviewed multifactorial gait disturbance  - Agree with evaluation by neuroophthalmology (she reports she has been referred to them)              - Messaged Ashley Bowles, OD to discuss              - Ongoing management of low vision by ophthalmology team  - Discussed reducing or considering alternatives to aripiprazole, recommend discussing with psychiatry  - Physical therapy referral to work with our neurology PT team, discuss appropriate assistive devices  - Referral to Dr. Candelario, neuro-otology  - Consider 7T MRI brain in the future     Reviewed brain mri by me - looking at levin ratio and callosal angle and clinical evaluation dont quite fit a diagnosis of NPH  Additional labs reviewed by me. Decision  not to hospitalize for a lumbar drain for evaluation for NPH. Reviewed abilify and encouraged a dose reduction if possible. Will set up a followup after further evaluation by neuro ophthalmology as suspect a combination of changes from the orbital fracture and possible brainstem injury. May need 7T brain mri depending on workup. Encouraged physical therapy and use of cane due to high risk of falling      Pursuits somewhat choppy. Left eye intorts  with downgaze.     12/18/2024 opthth Jorge Luis Hernandez   # Myopic degeneration  - No retinal holes or breaks seen on fundus exam today  - RD warning signs explained   - patient knows to come in if any change in symptoms     # Lamellar hole right eye   -Stable compared to previous scans  - observe      # Primary open angle glaucoma (POAG) both eyes    Managed with topical drops  Follows with Dr. Mirza     # history of Dry eye syndrome   - Follows with Dr. Ryder      # Pseudophakic both eyes   - Stable     # history of vitrectomy left eye   - S/P floaterectomy  By Dr. Henok Bolton in [facility]         Medications       AM PM       Aripiprazole abilify 5mg      1       Ascorbic acid vit C dose?  1      Atorvastatin lipitor 20mg      1       Brimonidine alphage 0.2% son   1 1       Enrico carbonate  1      Cholecalciferol Vit D3 dose?  1      Dorzolamide - timolol cosopt 2 - 0.5%   1 1       Gabapentin neurontin 100mg PRN           Hypromellose genteal ophthal gel              Lamotrigine lamictal 150mg      2       Levothyroxine synthroid 25mcg    1         MVI adv enrico/d/mag above  1 1       MVI ocuvite    1   1       Omega 3 1000mg    1 1        Omeprazole sodium bicarb 20 - 1100    prn         Trazodone desyrel 50mg     1       Vit D3 cholecalciferol 25 1000 above                Plan:    No documented eye movement in eye evaluation but there are changes seen on examination and wondering if related to her prior head traumas in 2023 and 2024 and these are associated with her gait/balance issues.     I had considered a 7T brain mri to get better resolution of her brain stem as there is a concern there are changes there that may be relevant in her clinical features.     She will  see Dr. Candelario to review her eye movements and gait and balance, etc. In June 205  She has ongoing therapy.     She continues to have some parkinsonian features - right sided. She is taking abilify 5mg daily.   She is getting this medication from  Dr. Foster - new doctor - and may be helping mood.   This psychiatrist is not in the [employer] system.   She and I discussed pros and cons of a dopamine scan - I did place an order but not sure if she will proceed with the testing.     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    In summary she will proceed with therapy  She will hold off on tests  See Dr. Candelario   Return in 6-9 months.       Future Appointments 3/11/2025 - 9/7/2025        Date Visit Type Length Department Provider     3/11/2025  1:00 PM RETURN MOVEMENT DISORDER 30 min [facility] Bruce Rodrigez MD    Location Instructions:     [facility] [place] area with multiple transportation and parking options. You may wish to review options for  service and self-parking in more detail on the [facility] [place] website at [email or web address].&nbsp;                3/18/2025  2:45 PM PT NEURO EVAL 45 min [facility] PT OUTPATIENT Marlen Middleton PT    Location Instructions:     Our clinic is located at: [facility] [address] [place] [address], [address], [place] [address]  How to find our clinic: Enter through the Front Door labeled 140.  Parking: Free parking is available in the surface lot.  Questions or to Reschedule: Contact our clinic: [phone].  This appointment is in a hospital-based location.&nbsp; Before your visit, you may want to check with your insurance company for coverage and referral options, including cost differences between services provided in different clinic settings.&nbsp; For more information visit this link on the [facility] Website:&nbsp; [email or web address]              3/25/2025  2:45 PM PT NEURO EVAL 45 min UR PT OUTPATIENT Marlen Middleton PT    Location Instructions:     Our clinic is located at: [facility] [address], [address], [place] [address]  How to find our clinic: Enter through the Front Door labeled 140.  Parking: Free parking is available in the surface lot.  Questions or to Reschedule: Contact our clinic: [phone].  This appointment is in a hospital-based location.&nbsp; Before your visit, you may want to check with your insurance company for coverage and referral options, including cost differences between services provided in different clinic settings.&nbsp; For more information visit this link on the [facility] Website:&nbsp; [email or web address]              3/28/2025  9:30 AM OT NEURO EVAL 45 min [facility] Yanique Zhu OT    Location Instructions:     The [facility] ([facility] is in a dense urban area with multiple transportation and parking options. You may wish to review options for  service and self-parking in more detail on the [facility] website at [email or web address].&nbsp;                4/1/2025  3:00 PM NEW DERMATOLOGY 30 min [facility] Whit Harper PA-C    Location Instructions:     The [facility] ([facility]) is in a dense urban area with multiple transportation and parking options. You may wish to review options for  service and self-parking in more detail on the [facility] website at [email or web address].              4/2/2025  2:45 PM PT NEURO EVAL 45 min UR PT OUTPATIENT Marlen Middleton PT    Location Instructions:     Our clinic is located at: [facility] [address], [address], [place] [address]  How to find our clinic: Enter through the Front Door labeled 140.  Parking: Free parking is available in the surface lot.  Questions or to Reschedule: Contact our clinic: [phone].  This appointment is in a hospital-based location.&nbsp; Before your visit, you may want to check with your insurance company for coverage and referral  options, including cost differences between services provided in different clinic settings.&nbsp; For more information visit this link on the [facility] Website:&nbsp; [email or web address]              6/4/2025 12:30 PM NEW NEUROLOGY 60 min [facility] Cornelio Candelario MD    Location Instructions:     [facility] [place] a dense urban area with multiple transportation and parking options. You may wish to review options for  service and self-parking in more detail on the [facility] [place] website at [email or web address]nbsp;                8/13/2025 12:15 PM RETURN ADULT GLAUCOMA 15 min [facility] Lul Mirza MD    Location Instructions:     Located in the [facility].&nbsp; Parking is available in the Patient/Visitor parking ramp located on the corner of [address] and [address]. Due to increased security to all of the [employer] Buildings on the [facility], the entrances to [facility] and the [facility] will not be open until 7am and may have a  stationed at the entrances. Please bring this letter with you in case you are asked for proof of appointment.  PARKING OPTIONS: Eye Clinic Parking/Shuttle Options  Service Hours: Mon - Sun: 24hrs  service is available for patients with mobility limitations. [facility] Hours: Mon - Fri: 7:00 am - 4:30 pm&nbsp; Desk Number: [phone] Shuttle Hours: Patient/Family Shuttle Mon - Fri: 7:00 am - 7:00 pm Shuttle request number: [phone] Ramp Escort Service Mon - Sun: 2:00 pm - 8:00 pm  Arrival Process for : a. Arrive at [facility] b. Inform  staff they are going to eye clinic c. Handoff keys to  and take ticket d. Request a shuttle ride to [facility]  Arrival Process for Self-Parking: a. Park in the Patient/Visitors Ramp b. Call the shuttle request number from your personal devise and request a  ride to PWB. Wait by the elevator lobby and communicate to the request line which floor for pick-up  Exit Process for : a. Connect with Ambassador team to radio for shuttle to take back to [address] b. Present your ticket to  and pay to retrieve the vehicle  Exit Process for Self-Parking: a. Connect with Ambassador team to radio for ramp escort to their vehicle (ramp escort is unable to accommodate any wheelchairs please wait in the lobby while the shuttle escorts their care takers to their cars in the ramp for a quicker  cycle) b. Let  know which floor to deliver you to  This appointment is in a hospital-based location.&nbsp; Before your visit, you may want to check with your insurance company for coverage and referral options, including cost differences between services provided in different clinic settings.&nbsp; For more information visit this link on the [employer] Website:&nbsp; [email or web address]                             Coding statement:   Medical Decision Making:  #  Chronic progressive medical conditions addressed  - see above --   Review and/or interpretation of unique test or documentation from a provider outside of neurology yesk   Independent historian provided additional details  no I  Prescription drug management and review of potential side effects and/or monitoring for side effects  -- see above ---  Health impacted by social determinants of health  no    I have reviewed the note as documented above.  This accurately captures the substance of my conversation with the patient and total time spent preparing for visit, executing visit and completing visit on the day of the visit:  30 minutes.  The portion of this total time included face to face time     The longitudinal plan of care for Emperatriz Esteban was addressed during this visit. Due to the added complexity in care, I will continue to support Emperatriz Esteban in the subsequent management of this condition(s) and  with the ongoing continuity of care of this condition(s).      Bruce Rodrigez MD     ______________________________________    Last visit date and details:             ______________________________________      Patient was asked about 14 Review of systems including changes in vision (dry eyes, double vision), hearing, heart, lungs, musculoskeletal, depression, anxiety, snoring, RBD, insomnia, urinary frequency, urinary urgency, constipation, swallowing problems, hematological, ID, allergies, skin problems: seborrhea, endocrinological: thyroid, diabetes, cholesterol; balance, weight changes, and other neurological problems and these were not significant at this time except for   Patient Active Problem List   Diagnosis    Bipolar affective disorder (H)    Other specified hypothyroidism    Prediabetes    Impaired fasting glucose    Other specified glaucoma    Bilateral low back pain without sciatica    Both eyes affected by degenerative myopia with macular hole    Bipolar affective disorder, currently depressed, moderate (H)    Cerebrovascular disease    Closed fracture of left orbit, initial encounter (H)    Dizziness    Fall from standing, initial encounter    Family history of malignant neoplasm of ovary    Fatigue    Fibrocystic disease of breast    VAL (generalized anxiety disorder)    Hyperglycemia    Incoordination    Mixed hyperlipidemia    Hyperlipidemia    Menopausal and postmenopausal disorder    Menopausal syndrome    Open fracture of frontal bone, initial encounter (H)    Other specified symptoms and signs involving the circulatory and respiratory systems    Polyneuropathy    Pure hypercholesterolemia    Traumatic brain injury, without loss of consciousness, initial encounter (H)    SAH (subarachnoid hemorrhage) (H)    Uncomplicated asthma    Gastroesophageal reflux disease without esophagitis    Hypothyroidism, unspecified type    Glaucoma    Family history of Parkinson disease    Cerebral hemorrhage (H)     Cerebral ventriculomegaly    Congestion of respiratory tract    Cough    Degenerative progressive high myopia    Disuse syndrome    Fear of falling    History of bronchitis    History of cerebrovascular accident    History of fall    Low back pain    Tremor    Impairment of balance    History of subarachnoid hemorrhage    Bipolar disorder (H)    Hypothyroidism    Traumatic brain injury without loss of consciousness (H)          Allergies   Allergen Reactions    No Clinical Screening - See Comments Other (See Comments)     Steroids- contraindicated, has glaucoma per pt    Other (Do Not Use)      Other Reaction(s): STEROIDS(SENSITIVITY) - pressure in eyes    Prednisone Other (See Comments)     Has glaucoma, potential for ocular pressure spike.  She has never been on steroids.    Other Reaction(s): Unknown     Past Surgical History:   Procedure Laterality Date    CATARACT IOL, RT/LT Bilateral     VITRECTOMY ANTERIOR Right 2019    for floaters      Past Medical History:   Diagnosis Date    Bipolar 1 disorder (H)     Family history of Parkinson disease 2024     Social History     Socioeconomic History    Marital status:      Spouse name: Not on file    Number of children: Not on file    Years of education: Not on file    Highest education level: Not on file   Occupational History    Not on file   Tobacco Use    Smoking status: Former     Current packs/day: 0.00     Types: Cigarettes     Quit date: 1997     Years since quittin.1     Passive exposure: Never    Smokeless tobacco: Never   Vaping Use    Vaping status: Never Used   Substance and Sexual Activity    Alcohol use: Yes     Alcohol/week: 4.0 - 5.0 standard drinks of alcohol     Types: 4 - 5 Standard drinks or equivalent per week    Drug use: No    Sexual activity: Not Currently   Other Topics Concern    Not on file   Social History Narrative    Not on file     Social Drivers of Health     Financial Resource Strain: Not on file   Food  Insecurity: Not on file   Transportation Needs: Not on file   Physical Activity: Not on file   Stress: Not on file   Social Connections: Not on file   Interpersonal Safety: Not on file   Housing Stability: Not on file       Drug and lactation database from the [place] [employer]:  http://toxnet.nlm.nih.gov/cgi-bin/sis/htmlgen?LACT      B/P: Data Unavailable, T: Data Unavailable, P: Data Unavailable, R: Data Unavailable 0 lbs 0 oz  not currently breastfeeding., There is no height or weight on file to calculate BMI.  Medications and Vitals not listed above were documented in the cart and reviewed by me.     Current Outpatient Medications   Medication Sig Dispense Refill    ARIPiprazole (ABILIFY) 5 MG tablet Take 1 tablet (5 mg) by mouth daily 90 tablet 1    Ascorbic Acid (VITAMIN C PO)       Ascorbic Acid (VITAMIN C) 100 MG CHEW Take by mouth.      atorvastatin (LIPITOR) 20 MG tablet Take 1 tablet (20 mg) by mouth daily 90 tablet 0    brimonidine (ALPHAGAN) 0.2 % ophthalmic solution Place 1 drop into both eyes 2 times daily. 10 mL 11    calcium carbonate 750 MG CHEW Take 750 mg by mouth daily.      Cholecalciferol (VITAMIN D-3 PO)       dorzolamide-timolol (COSOPT) 2-0.5 % ophthalmic solution Place 1 drop into both eyes 2 times daily. 10 mL 11    gabapentin (NEURONTIN) 100 MG capsule Take 1 capsule (100 mg) by mouth 2 times daily.      lamoTRIgine (LAMICTAL) 150 MG tablet Take 2 tablets (300 mg) by mouth at bedtime 180 tablet 1    levothyroxine (SYNTHROID/LEVOTHROID) 150 MCG tablet Take 150 mcg by mouth daily.      levothyroxine (SYNTHROID/LEVOTHROID) 25 MCG tablet Take 1 tablet (25 mcg) by mouth daily 90 tablet 3    Multiple Minerals-Vitamins (ADVANCED CALCIUM/D/MAGNESIUM) TABS Take 1,500 mg by mouth daily       multivitamin (OCUVITE) TABS Take 1 tablet by mouth daily  30 each 0    omega 3 1000 MG CAPS Take 3 g by mouth daily  90 capsule     Omega-3 Fatty Acids (FISH OIL PO)        omeprazole-sodium bicarbonate  MG CAPS per capsule       traZODone (DESYREL) 50 MG tablet Take 1-2 tablets ( mg) by mouth nightly as needed for sleep 180 tablet 1    Vitamin D3 (VITAMIN D, CHOLECALCIFEROL,) 25 mcg (1000 units) tablet Take by mouth daily.           Bruce Rodrigez MD

## 2025-02-18 ENCOUNTER — THERAPY VISIT (OUTPATIENT)
Dept: PHYSICAL THERAPY | Facility: CLINIC | Age: 70
End: 2025-02-18
Attending: STUDENT IN AN ORGANIZED HEALTH CARE EDUCATION/TRAINING PROGRAM
Payer: MEDICARE

## 2025-02-18 DIAGNOSIS — F40.298 FEAR OF FALLING: Primary | ICD-10-CM

## 2025-02-18 DIAGNOSIS — R26.89 BALANCE PROBLEMS: ICD-10-CM

## 2025-02-18 PROCEDURE — 97110 THERAPEUTIC EXERCISES: CPT | Mod: GP | Performed by: PHYSICAL THERAPIST

## 2025-02-18 PROCEDURE — 97161 PT EVAL LOW COMPLEX 20 MIN: CPT | Mod: GP | Performed by: PHYSICAL THERAPIST

## 2025-02-18 PROCEDURE — 97530 THERAPEUTIC ACTIVITIES: CPT | Mod: GP | Performed by: PHYSICAL THERAPIST

## 2025-02-18 NOTE — PROGRESS NOTES
"PHYSICAL THERAPY EVALUATION  Type of Visit: Evaluation        Fall Risk Screen:  Fall screen completed by: PT  Have you fallen 2 or more times in the past year?: No  Have you fallen and had an injury in the past year?: No  Is patient a fall risk?: Yes  Fall screen comments: strong fear of falling.    Subjective   Has Fear of falling.  Fell one yr ago jackie outside when visit friend. Outside on cement.  Hit head; in hosp overnight.    Wears glasses- eyes myopic degeneration - shadows in vision; distortion when look  DOWN.  Or read.  Distortion of ground view.  Dx 2016.  Had eye shots.  Progressive. Getting words.  Can read jennifer but not books.        Presenting condition or subjective complaint: balance  Date of onset: 11/26/24    Relevant medical history:     Dates & types of surgery: 2015    Prior diagnostic imaging/testing results: MRI     Prior therapy history for the same diagnosis, illness or injury: Yes august 2024    Prior Level of Function  Transfers: Independent  Ambulation: Independent  ADL: Independent  IADL: Finances, Meal preparation, Medication management    Living Environment  Social support: Alone [address]  friends give me rides.    Type of home: Apartment/condo; Multi-level ap't   Stairs to enter the home: No   elevator    Ramp: No   Stairs inside the home: No   no    Help at home: Noneperson cleans for me.  Equipment owned: Walker with wheels 2 canes.  - 3 ww don't use.   Trouble using cane feels unstable.      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    Hobbies/Interests: lots of community activiteschair entertain/ social justice. Write newsletter. Reads. Has dog - has  daily. \"Keya\" min poodle.  12 lb.     Patient goals for therapy: feel safe in the world.  Less fear of falling.  Better balance. feel safe    Pain assessment: Pain present  stiff neck/ getting massage this week.      Objective      Cognitive Status Examination  Orientation: Oriented to person, place " and time   Level of Consciousness: Alert  Follows Commands and Answers Questions: 100% of the time  Personal Safety and Judgement: Intact  Memory: Intact    OBSERVATION: forw head, glasses. Walks carefully. Looks at ground.  INTEGUMENTARY: Intact  POSTURE:  forw head. Limited neck rom rot 50%  PALPATION: na  RANGE OF MOTION: LE ROM WFL  UE ROM WFL  Neck rom tot 50%  STRENGTH: LE Strength WFL  UE Strength WFL    BED MOBILITY: Independent    TRANSFERS: WFL    WHEELCHAIR MOBILITY: NA.   5 X STS.  14 sec  80 HR  GAIT:   10 meter: 0.72 m/s  TUG; Not tested  Main  Level of Mathews: SBA  Assistive Device(s): None  Gait Deviations: Antalgic; slow camryn  Gait Distance: 200ft  Stairs: NT    BALANCE:  main 39/56    Assessment & Plan   CLINICAL IMPRESSIONS  Medical Diagnosis: balance problems, fall 2024    Treatment Diagnosis: decreased sensory input   Impression/Assessment: Patient is a 69 year old female with imbalane/ fear of falls complaints.  The following significant findings have been identified: Impaired balance, Impaired gait, Impaired muscle performance, Decreased activity tolerance, Impaired posture, Instability, and Impaired vision. These impairments interfere with their ability to perform self care tasks, recreational activities, household chores, household mobility, and community mobility as compared to previous level of function.     Clinical Decision Making (Complexity):  Clinical Presentation: Stable/Uncomplicated  Clinical Presentation Rationale: based on medical and personal factors listed in PT evaluation  Clinical Decision Making (Complexity): Low complexity    PLAN OF CARE  Treatment Interventions:  Interventions: Gait Training, Neuromuscular Re-education, Therapeutic Activity, Therapeutic Exercise, Self-Care/Home Management, Canalith Repositioning    Long Term Goals     PT Goal 1  Goal Identifier: gait  Goal Description: pt to improve gait speed for community amb to 1.0 m/s to cross street/  lot safely  Target Date: 05/18/25  PT Goal 2  Goal Identifier: balance  Goal Description: pt to show improved العلي score to 45 or more for decreased fall risk.  Target Date: 05/18/25  PT Goal 3  Goal Identifier: falls prev ideas  Goal Description: pt to demo/ verbalize 3+ falls prev ideas.  Target Date: 05/18/25  PT Goal 4  Goal Identifier: HEP  Goal Description: pt to demo indep w/ a HEP for balance, wellness  Target Date: 05/18/25      Frequency of Treatment: up to 8x in  Duration of Treatment: 90 days.    Recommended Referrals to Other Professionals:  na for now  Education Assessment:   Learner/Method: Patient;Listening;Demonstration  Education Comments: 2/18/25    See the 2 exercises in the folder.   Try to do them daily.    USE YOUR CANE when you leave the building -   Bring your favorite cane to PT next time.    Tripod of balance - 1. Legs  2.  eyes/vision  3.  inner ear.     To compensate for vision -   USE CANE  Good lighting  Strengthening the legs  Walking indoors more - daily-  go for a walk  - at a time when you are free.  Jose system is good.      We CAN / you can improve your balance!!  Marlen - PT    Risks and benefits of evaluation/treatment have been explained.   Patient/Family/caregiver agrees with Plan of Care.     Evaluation Time:     PT Eval, Low Complexity Minutes (28060): 21       Signing Clinician: Marlen Middleton, PT        [facility]                                                                                   OUTPATIENT PHYSICAL THERAPY      PLAN OF TREATMENT FOR OUTPATIENT REHABILITATION   Patient's Last Name, First Name, Emperatriz Gupta YOB: 1955   Provider's Name   [facility]   Medical Record No.  [id number]     Onset Date: 11/26/24  Start of Care Date: 02/18/25     Medical Diagnosis:  balance problems, fall 2/2024      PT Treatment Diagnosis:  decreased sensory input Plan of  Treatment  Frequency/Duration: up to 8x in/ 90 days.    Certification date from 02/18/25 to 05/18/25         See note for plan of treatment details and functional goals     Marlen Middleton, PT                         I CERTIFY THE NEED FOR THESE SERVICES FURNISHED UNDER        THIS PLAN OF TREATMENT AND WHILE UNDER MY CARE     (Physician attestation of this document indicates review and certification of the therapy plan).              Referring Provider:  Alejandra Jacobo    Initial Assessment  See Epic Evaluation- Start of Care Date: 02/18/25

## 2025-02-18 NOTE — PATIENT INSTRUCTIONS
2/18/25    See the 2 exercises in the folder.   Try to do them daily.    USE YOUR CANE when you leave the building -   Bring your favorite cane to PT next time.    Tripod of balance - 1. Legs  2.  eyes/vision  3.  inner ear.     To compensate for vision -   USE CANE  Good lighting  Strengthening the legs  Walking indoors more - daily-  go for a walk  - at a time when you are free.  Jose system is good.      We CAN / you can improve your balance!!  Marlen - PT

## 2025-02-28 RX ORDER — LAMOTRIGINE 100 MG/1
TABLET ORAL
COMMUNITY
Start: 2025-01-28 | End: 2025-03-11 | Stop reason: DRUGHIGH

## 2025-03-06 NOTE — TELEPHONE ENCOUNTER
RECORDS RECEIVED FROM: internal   REASON FOR VISIT: Balance problems    PROVIDER: Dr. Candelario   DATE OF APPT: 6/4/25   NOTES (FOR ALL VISITS) STATUS DETAILS   OFFICE NOTE from referring provider Internal Dr Rodrigez @ [facility]:  3/11/25  11/26/24   OFFICE NOTE from other specialist Internal Dr Downey @ [facility]:  10/7/24  9/23/24   MEDICATION LIST Internal    IMAGING  (FOR ALL VISITS)     MRI (HEAD, NECK, SPINE) PACS [facility]:  MRI Brain 10/3/24   CT (HEAD, NECK, SPINE) PACS [facility]  2/21/24 CT Head  2/12/24 CT Head  2/12/24 CT Head  2/12/24 CT Cervical Spine

## 2025-03-09 ENCOUNTER — HEALTH MAINTENANCE LETTER (OUTPATIENT)
Age: 70
End: 2025-03-09

## 2025-03-11 ENCOUNTER — OFFICE VISIT (OUTPATIENT)
Dept: NEUROLOGY | Facility: CLINIC | Age: 70
End: 2025-03-11
Payer: MEDICARE

## 2025-03-11 VITALS
RESPIRATION RATE: 16 BRPM | SYSTOLIC BLOOD PRESSURE: 123 MMHG | HEART RATE: 72 BPM | OXYGEN SATURATION: 98 % | DIASTOLIC BLOOD PRESSURE: 78 MMHG

## 2025-03-11 DIAGNOSIS — Z82.0 FAMILY HISTORY OF PARKINSON DISEASE: ICD-10-CM

## 2025-03-11 DIAGNOSIS — G20.A1 PARKINSON'S DISEASE, UNSPECIFIED WHETHER DYSKINESIA PRESENT, UNSPECIFIED WHETHER MANIFESTATIONS FLUCTUATE (H): ICD-10-CM

## 2025-03-11 DIAGNOSIS — Z86.79 HISTORY OF SUBARACHNOID HEMORRHAGE: ICD-10-CM

## 2025-03-11 DIAGNOSIS — R26.89 BALANCE PROBLEMS: Primary | ICD-10-CM

## 2025-03-11 RX ORDER — VITS A,C,E/LUTEIN/MINERALS 300MCG-200
1 TABLET ORAL DAILY
COMMUNITY
End: 2025-03-11

## 2025-03-11 ASSESSMENT — PAIN SCALES - GENERAL: PAINLEVEL_OUTOF10: NO PAIN (0)

## 2025-03-11 NOTE — LETTER
3/11/2025       RE: Emperatriz Esteban  [address]  [facility] [address]     Dear Colleague,    Thank you for referring your patient, Emperatriz Esteban, to the [facility] [place] at [facility]. Please see a copy of my visit note below.        Diagnosis/Summary/Recommendations:    PATIENT: Emperatriz Esteban  69 year old female     : 1955    HAROLDO: Mar 11, 2025     MRN: [id number]  [address]   [facility] [address]  Mobile Phone  [phone]  Email  [email or web address]       Yumiko Esteban  Emergency Contact, Cousin  Young Proxy Access  D  Daniela Eulalia  Emergency Contact, Sister  Young Proxy Access         (Home)  [address] [place] [address]    Former (Sep. 28, 2021 - 2024):   (Home)  [address] [place] [address] Emperatriz Esteban [phone] (Home)  [email or web address]         Assessment:  (R26.89) Balance problems  (primary encounter diagnosis)  (Z86.79) History of subarachnoid hemorrhage  Neuropathy  Brother with parkinsonism.     Seen by Dr. Downey virtually 10/7/2024     We discussed the result of the MRI, which showed mild ventriculomegaly.  The finding could suggest normal pressure hydrocephalus.  However, she did not have characteristic magnetic gait on exam.  She does not have cognitive impairment or urinary disturbance.  Secondary NPH in the setting of prior subarachnoid hemorrhage is also possible, but this would not explain her balance difficulties as her symptoms predated the hemorrhage.  The degree of subarachnoid hemorrhage was also very minimal and unlikely to cause CSF flow dysfunction.  Given I have not found any other neurological cause of her balance difficulties, normal pressure hydrocephalus could not be entirely excluded.  I would like to get an opinion from our movement disorder specialist to see if there is any role of large-volume spinal tap.     "  Head CT 2/21/2024  Impression:   1. Interval resolution of left frontal subarachnoid hemorrhage with no new intracranial pathology.   2. Stable left orbital roof and frontal bone fracture with associated left frontal scalp hematoma.      Brain MRI 10/3/2024  1. No acute intracranial pathology.  2. Mild ventriculomegaly out of proportion to the cerebral sulci  suggestive of underlying normal pressure hydrocephalus.        Review of diagnosis    Ms. Esteban presents by herself.      She states she is always afraid of falling  She had a terrible fall last Feb 2024 - brain bleed, cracked skull, bruised face     She states she doesn't feel steady in the world - this preceded fall which is now worse (attributes to fear after her fall).   She gets really unstable picking up her dog's bowl from the floor.   When she walks she weaves   Going up curbs, up/down stairs is a nightmare - she avoid stairs as much as possible.      The only place she feels safe walking is where she lives ([facility] with large [facility]).   She walks with other people, Dr. Downey told her she shouldn't walk by herself.      The first time she felt unsteady was 5-6 years ago. She woke up one morning and couldn't walk a straight line. She has continued to feel off balance since that time. This is really difficult for her to describe - \"I feel like I lose my feet, I almost trip a lot.\" There may be a slight sense of motion associated with this feeling.      When she leans to the side, it will feel like she won't be able to catch herself (but she is able to catch herself)     No falls since February 2024     She had a fall prior to Feb 2024 - one occur a year ago spring 2023. She believes her dog pulled her off balance (miniature poodle). She had a difficult time getting up from that fall. Now she has a  for her dog and walks with them for exercise.      Tingly numbness in her feet - better recently but occasionally comes back at " "night. Mostly in her toes.      When she is outside, she gets exhausted because maintaining her balance feels really hard. Walking around the building she generally feels pretty stable.      She has myopic degeneration and glaucoma - attributes at least some of her difficult with balance to problems with her vision. Her myopic degeneration causes her to see shadows in her vision - vision is distorted. When she looks down she doesn't see a stable surface because there are shadows on it.     No tremor. No slowness or stiffness in her body.      Brother with Parkinson's disease. Diagnosed in his mid 60s. No other family history of movement problems. Her father had a stroke and struggled with balance - she believes around 74-75. Her mother fell down one day and wasn't able to get up (in her mid 80s), she was then transferred to a nursing home.     Reportedly had a small stroke in 2004. \"I talked funny and fell down.\" She was told she had a small stroke. She might have had slightly more difficulty memorizing things after this. Started on atorvastatin.      She has been seen at a [facility]     Avoidance of dopamine blockers   On aripiprazole 5 mg for bipolar disorder               She has been on this for more than 5 years at this dose     Motor complication review   Frequent falls     Review of Impulse control disorders   N/a     Review of surgical or medication options   reviewed     Gait/Balance/Falls   Yes, off balance as above     Exercise/Therapy performed/offered   Walking around her detention community.      She has worked with a really good physical therapist - worked with her over the summer  She has a routine from her PT but does not do this.   She thinks it's good for her legs to be stronger but didn't notice a lot of benefit with regards to balance.     Cognitive/Driving   No concerns about thinking or memory.   Occasionally will forget things like what TV show she was watching the " "night before - no functional impairment.      Not driving - due to vision     Mood   Bipolar affective disorder     Mood has been bad since the election  Outside of this mood has been generally stable on her current regimen.     Aripiprazole 5 mg daily  Lamotrigine 300 mg qpm     Gabapentin for anxiety and neuropathy     Hallucinations/delusions   No hallucinations     Sleep   \"I sleep really well\"      No dream enactment.      Bladder/Renal/Prostate/Gyn/Other  No incontinence, increased urgency or frequency  Stream isn't very strong - attributes to age     GI/Constipation/GERD   Gerd     No constipation     ENDO/Lipid/DM/Bone density/Thyroid  Lipid disoder  Prediabtges  Hyperglycemia  Hypothyroidism  Lipd disorder     Cardio/heart/Hyper or Hypotensive   No cardiac history     Denies orthostatic symptoms.      Vision/Dry Eyes/Cataracts/Glaucoma/Macular   Macular hole bilater  Myopic degeneration  Glaucoma     Glaucoma suspect of both eyes      2. 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 Dry eye syndrome of both eyes    4. Epiretinal membrane (ERM) of right eye       #Dry eye syndrome OU  #exposure keratitis ou     She gets diplopia. She is not sure if it is horizontal or vertical.      Heme/Anticoagulation/Antiplatelet/Anemia/Other  ASA 81 mg daily - but hasn't been taking this for a while because she hasn't been putting it in her pill boxes.      ENT/Resp  Dizziness  Asthma     No changes to voice  Sense of smell is fine.     Skin/Cancer/Seborrhea/other  No history of cancer  No skin changes     Musculoskeletal/Pain/Headache  Cerebrovascular disease  Fatgigue  Incoordination  Closed fracture of orbit  TBI/SAH  Polyneuropathy  Bilateral low back pain   Dizziness      Other:  Menopausal  Fibrocystic breast disease     Family history of ovarian cancer     Multifactorial gait disturbance - vision impairment, suspect mild neuropathy, mild parkinsonism (possibly drug induced from Abilify) and " other possible contributions. She also has eye movement abnormalities that are difficult to characterize. She has had a superior orbital fracture from prior fall and has an extensive ophthalmologic history which could be contributing to this.      Her history and examination today do not raise any concern for normal pressure hydrocephalus. There is no additional testing on this front that we would recommend at this time.      She reports upcoming appointment with neuro-ophthalmology. On review, it appears she has an appointment with Ashley Bowles, optometry. We have sent a message to review our concerns.     While her imbalance is very difficult for her to describe, she does endorse some possible sense of motion (? rocking rather than classic vertigo). Placed a referral to our neuro-otologist, Dr. Candelario for further evaluation.     She underwent MRI brain which did not reveal clear structural explanation of her symptoms. We could consider getting a 7 mark MRI of the brain in the future for better evaluation of the brainstem in particular. We will defer this for the time being.      - Reviewed multifactorial gait disturbance  - Agree with evaluation by neuroophthalmology (she reports she has been referred to them)              - Messaged Ashley Bowles, OD to discuss              - Ongoing management of low vision by ophthalmology team  - Discussed reducing or considering alternatives to aripiprazole, recommend discussing with psychiatry  - Physical therapy referral to work with our neurology PT team, discuss appropriate assistive devices  - Referral to Dr. Candelario, neuro-otology  - Consider 7T MRI brain in the future     Reviewed brain mri by me - looking at levin ratio and callosal angle and clinical evaluation dont quite fit a diagnosis of NPH  Additional labs reviewed by me. Decision  not to hospitalize for a lumbar drain for evaluation for NPH. Reviewed abilify and encouraged a dose reduction if possible. Will set  up a followup after further evaluation by neuro ophthalmology as suspect a combination of changes from the orbital fracture and possible brainstem injury. May need 7T brain mri depending on workup. Encouraged physical therapy and use of cane due to high risk of falling      Pursuits somewhat choppy. Left eye intorts with downgaze.     12/18/2024 opthth Jorge Luis Hernandez   # Myopic degeneration  - No retinal holes or breaks seen on fundus exam today  - RD warning signs explained   - patient knows to come in if any change in symptoms     # Lamellar hole right eye   -Stable compared to previous scans  - observe      # Primary open angle glaucoma (POAG) both eyes    Managed with topical drops  Follows with Dr. Mirza     # history of Dry eye syndrome   - Follows with Dr. Ryder      # Pseudophakic both eyes   - Stable     # history of vitrectomy left eye   - S/P floaterectomy  By Dr. Henok Bolton in [facility]         Medications       AM PM       Aripiprazole abilify 5mg      1       Ascorbic acid vit C dose?  1      Atorvastatin lipitor 20mg      1       Brimonidine alphage 0.2% son   1 1       Enrico carbonate  1      Cholecalciferol Vit D3 dose?  1      Dorzolamide - timolol cosopt 2 - 0.5%   1 1       Gabapentin neurontin 100mg PRN           Hypromellose genteal ophthal gel              Lamotrigine lamictal 150mg      2       Levothyroxine synthroid 25mcg    1         MVI adv enrico/d/mag above  1 1       MVI ocuvite    1   1       Omega 3 1000mg    1 1        Omeprazole sodium bicarb 20 - 1100    prn         Trazodone desyrel 50mg     1       Vit D3 cholecalciferol 25 1000 above                Plan:    No documented eye movement in eye evaluation but there are changes seen on examination and wondering if related to her prior head traumas in 2023 and 2024 and these are associated with her gait/balance issues.     I had considered a 7T brain mri to get better resolution of her brain stem as there is a concern there are  changes there that may be relevant in her clinical features.     She will  see Dr. Candelario to review her eye movements and gait and balance, etc. In June 205  She has ongoing therapy.     She continues to have some parkinsonian features - right sided. She is taking abilify 5mg daily.   She is getting this medication from Dr. Foster - [facility] doctor - and may be helping mood.   This psychiatrist is not in the [facility] system.   She and I discussed pros and cons of a dopamine scan - I did place an order but not sure if she will proceed with the testing.     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    In summary she will proceed with therapy  She will hold off on tests  See Dr. Candelario   Return in 6-9 months.       Future Appointments 3/11/2025 - 9/7/2025        Date Visit Type Length Department Provider     3/11/2025  1:00 PM RETURN MOVEMENT DISORDER 30 min [facility] Bruce Rodrigez MD    Location Instructions:     [facility] ([facility]) is in a dense urban area with multiple transportation and parking options. You may wish to review options for  service and self-parking in more detail on the [facility] s website at [email or web address].&nbsp;                3/18/2025  2:45 PM PT NEURO EVAL 45 min [facility] PT OUTPATIENT Marlen Middleton PT    Location Instructions:     Our clinic is located at: [facility] [address] [place] [address], [address], [place] [address]  How to find our clinic: Enter through the Front Door labeled 140.  Parking: Free parking is available in the surface lot.  Questions or to Reschedule: Contact our clinic: [phone].  This appointment is in a hospital-based location.&nbsp; Before your visit, you may want to check with your insurance company for coverage and referral options, including cost differences between services provided in different clinic settings.&nbsp;  For more information visit this link on the [employer] Website:&nbsp; [email or web address]              3/25/2025  2:45 PM PT NEURO EVAL 45 min UR PT OUTPATIENT Marlen Middleton PT    Location Instructions:     Our clinic is located at: [facility] [address], [place] [address]  How to find our clinic: Enter through the Front Door labeled 140.  Parking: Free parking is available in the surface lot.  Questions or to Reschedule: Contact our clinic: [phone].  This appointment is in a hospital-based location.&nbsp; Before your visit, you may want to check with your insurance company for coverage and referral options, including cost differences between services provided in different clinic settings.&nbsp; For more information visit this link on the [employer] Website:&nbsp; [email or web address]              3/28/2025  9:30 AM OT NEURO EVAL 45 min [facility] THERAP[facility] Yanique Zhu, ASHA    Location Instructions:     The [facility] ([facility]) is in a dense urban area with multiple transportation and parking options. You may wish to review options for  service and self-parking in more detail on the [facility] s website at [email or web address].&nbsp;                4/1/2025  3:00 PM NEW DERMATOLOGY 30 min [facility] Whit Harper PA-C    Location Instructions:     The [facility] ([facility]) is in a dense urban area with multiple transportation and parking options. You may wish to review options for  service and self-parking in more detail on the [facility] s website at [email or web address].              4/2/2025  2:45 PM PT NEURO EVAL 45 min UR PT OUTPATIENT Marlen Middleton PT    Location Instructions:     Our clinic is located at: [facility] [address], [address] [id number]  How to find our clinic: Enter  through the Front Door labeled 140.  Parking: Free parking is available in the surface lot.  Questions or to Reschedule: Contact our clinic: [phone].  This appointment is in a hospital-based location.&nbsp; Before your visit, you may want to check with your insurance company for coverage and referral options, including cost differences between services provided in different clinic settings.&nbsp; For more information visit this link on the [employer] Website:&nbsp; [email or web address]              6/4/2025 12:30 PM NEW NEUROLOGY 60 min [facility] Cornelio Candelario MD    Location Instructions:     [facility] ([place]) is in a dense urban area with multiple transportation and parking options. You may wish to review options for  service and self-parking in more detail on the [facility] s website at [email or web address].&nbsp;                8/13/2025 12:15 PM RETURN ADULT GLAUCOMA 15 min [facility] EYE GENERAL Lul Mirza MD    Location Instructions:     Located in the [facility].&nbsp; Parking is available in the Patient/Visitor parking ramp located on the corner of [facility] and [facility]. Due to increased security to all of the Health Science Buildings on the [facility], the entrances to [facility] and the [facility] will not be open until 7am and may have a  stationed at the entrances. Please bring this letter with you in case you are asked for proof of appointment.  PARKING OPTIONS: Eye Clinic Parking/Shuttle Options  Service Hours: Mon - Sun: 24hrs  service is available for patients with mobility limitations. [facility] Hours: Mon - Fri: 7:00 am - 4:30 pm&nbsp; Desk Number: [phone] Shuttle Hours: Patient/Family Shuttle Mon - Fri: 7:00 am - 7:00 pm Shuttle request number: [phone] Ramp Escort Service Mon - Sun: 2:00 pm - 8:00 pm  Arrival Process for : a. Arrive at  [address] [facility] b. Inform  staff they are going to eye clinic c. Handoff keys to  and take ticket d. Request a shuttle ride to [facility]  Arrival Process for Self-Parking: a. Park in the Patient/Visitors Ramp b. Call the shuttle request number from your personal devise and request a ride to PWB. Wait by the elevator lobby and communicate to the request line which floor for pick-up  Exit Process for : a. Connect with Ambassador team to radio for shuttle to take back to [address] b. Present your ticket to  and pay to retrieve the vehicle  Exit Process for Self-Parking: a. Connect with Ambassador team to radio for ramp escort to their vehicle (ramp escort is unable to accommodate any wheelchairs please wait in the lobby while the shuttle escorts their care takers to their cars in the ramp for a quicker  cycle) b. Let  know which floor to deliver you to  This appointment is in a hospital-based location.&nbsp; Before your visit, you may want to check with your insurance company for coverage and referral options, including cost differences between services provided in different clinic settings.&nbsp; For more information visit this link on the [employer] Website:&nbsp; [email or web address]                             Coding statement:   Medical Decision Making:  #  Chronic progressive medical conditions addressed  - see above --   Review and/or interpretation of unique test or documentation from a provider outside of neurology yesk   Independent historian provided additional details  no I  Prescription drug management and review of potential side effects and/or monitoring for side effects  -- see above ---  Health impacted by social determinants of health  no    I have reviewed the note as documented above.  This accurately captures the substance of my conversation with the patient and total time spent preparing for visit, executing visit and completing visit on the day  of the visit:  30 minutes.  The portion of this total time included face to face time     The longitudinal plan of care for Emperatriz Esteban was addressed during this visit. Due to the added complexity in care, I will continue to support Emperatriz Esteban in the subsequent management of this condition(s) and with the ongoing continuity of care of this condition(s).      Bruce Rodrigez MD     ______________________________________    Last visit date and details:             ______________________________________      Patient was asked about 14 Review of systems including changes in vision (dry eyes, double vision), hearing, heart, lungs, musculoskeletal, depression, anxiety, snoring, RBD, insomnia, urinary frequency, urinary urgency, constipation, swallowing problems, hematological, ID, allergies, skin problems: seborrhea, endocrinological: thyroid, diabetes, cholesterol; balance, weight changes, and other neurological problems and these were not significant at this time except for   Patient Active Problem List   Diagnosis     Bipolar affective disorder (H)     Other specified hypothyroidism     Prediabetes     Impaired fasting glucose     Other specified glaucoma     Bilateral low back pain without sciatica     Both eyes affected by degenerative myopia with macular hole     Bipolar affective disorder, currently depressed, moderate (H)     Cerebrovascular disease     Closed fracture of left orbit, initial encounter (H)     Dizziness     Fall from standing, initial encounter     Family history of malignant neoplasm of ovary     Fatigue     Fibrocystic disease of breast     VAL (generalized anxiety disorder)     Hyperglycemia     Incoordination     Mixed hyperlipidemia     Hyperlipidemia     Menopausal and postmenopausal disorder     Menopausal syndrome     Open fracture of frontal bone, initial encounter (H)     Other specified symptoms and signs involving the circulatory and respiratory systems     Polyneuropathy     Pure  hypercholesterolemia     Traumatic brain injury, without loss of consciousness, initial encounter (H)     SAH (subarachnoid hemorrhage) (H)     Uncomplicated asthma     Gastroesophageal reflux disease without esophagitis     Hypothyroidism, unspecified type     Glaucoma     Family history of Parkinson disease     Cerebral hemorrhage (H)     Cerebral ventriculomegaly     Congestion of respiratory tract     Cough     Degenerative progressive high myopia     Disuse syndrome     Fear of falling     History of bronchitis     History of cerebrovascular accident     History of fall     Low back pain     Tremor     Impairment of balance     History of subarachnoid hemorrhage     Bipolar disorder (H)     Hypothyroidism     Traumatic brain injury without loss of consciousness (H)          Allergies   Allergen Reactions     No Clinical Screening - See Comments Other (See Comments)     Steroids- contraindicated, has glaucoma per pt     Other (Do Not Use)      Other Reaction(s): STEROIDS(SENSITIVITY) - pressure in eyes     Prednisone Other (See Comments)     Has glaucoma, potential for ocular pressure spike.  She has never been on steroids.    Other Reaction(s): Unknown     Past Surgical History:   Procedure Laterality Date     CATARACT IOL, RT/LT Bilateral      VITRECTOMY ANTERIOR Right 2019    for floaters      Past Medical History:   Diagnosis Date     Bipolar 1 disorder (H)      Family history of Parkinson disease 2024     Social History     Socioeconomic History     Marital status:      Spouse name: Not on file     Number of children: Not on file     Years of education: Not on file     Highest education level: Not on file   Occupational History     Not on file   Tobacco Use     Smoking status: Former     Current packs/day: 0.00     Types: Cigarettes     Quit date: 1997     Years since quittin.1     Passive exposure: Never     Smokeless tobacco: Never   Vaping Use     Vaping status: Never Used    Substance and Sexual Activity     Alcohol use: Yes     Alcohol/week: 4.0 - 5.0 standard drinks of alcohol     Types: 4 - 5 Standard drinks or equivalent per week     Drug use: No     Sexual activity: Not Currently   Other Topics Concern     Not on file   Social History Narrative     Not on file     Social Drivers of Health     Financial Resource Strain: Not on file   Food Insecurity: Not on file   Transportation Needs: Not on file   Physical Activity: Not on file   Stress: Not on file   Social Connections: Not on file   Interpersonal Safety: Not on file   Housing Stability: Not on file       Drug and lactation database from the [place] [employer]:  http://toxnet.nlm.nih.gov/cgi-bin/sis/htmlgen?LACT      B/P: Data Unavailable, T: Data Unavailable, P: Data Unavailable, R: Data Unavailable 0 lbs 0 oz  not currently breastfeeding., There is no height or weight on file to calculate BMI.  Medications and Vitals not listed above were documented in the cart and reviewed by me.     Current Outpatient Medications   Medication Sig Dispense Refill     ARIPiprazole (ABILIFY) 5 MG tablet Take 1 tablet (5 mg) by mouth daily 90 tablet 1     Ascorbic Acid (VITAMIN C PO)        Ascorbic Acid (VITAMIN C) 100 MG CHEW Take by mouth.       atorvastatin (LIPITOR) 20 MG tablet Take 1 tablet (20 mg) by mouth daily 90 tablet 0     brimonidine (ALPHAGAN) 0.2 % ophthalmic solution Place 1 drop into both eyes 2 times daily. 10 mL 11     calcium carbonate 750 MG CHEW Take 750 mg by mouth daily.       Cholecalciferol (VITAMIN D-3 PO)        dorzolamide-timolol (COSOPT) 2-0.5 % ophthalmic solution Place 1 drop into both eyes 2 times daily. 10 mL 11     gabapentin (NEURONTIN) 100 MG capsule Take 1 capsule (100 mg) by mouth 2 times daily.       lamoTRIgine (LAMICTAL) 150 MG tablet Take 2 tablets (300 mg) by mouth at bedtime 180 tablet 1     levothyroxine (SYNTHROID/LEVOTHROID) 150 MCG tablet Take 150 mcg by mouth daily.        levothyroxine (SYNTHROID/LEVOTHROID) 25 MCG tablet Take 1 tablet (25 mcg) by mouth daily 90 tablet 3     Multiple Minerals-Vitamins (ADVANCED CALCIUM/D/MAGNESIUM) TABS Take 1,500 mg by mouth daily        multivitamin (OCUVITE) TABS Take 1 tablet by mouth daily  30 each 0     omega 3 1000 MG CAPS Take 3 g by mouth daily  90 capsule      Omega-3 Fatty Acids (FISH OIL PO)        omeprazole-sodium bicarbonate  MG CAPS per capsule        traZODone (DESYREL) 50 MG tablet Take 1-2 tablets ( mg) by mouth nightly as needed for sleep 180 tablet 1     Vitamin D3 (VITAMIN D, CHOLECALCIFEROL,) 25 mcg (1000 units) tablet Take by mouth daily.           Bruce Rodrigez MD      Again, thank you for allowing me to participate in the care of your patient.      Sincerely,    Bruce Rodrigez MD
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
## 2025-03-11 NOTE — NURSING NOTE
Chief Complaint   Patient presents with    RECHECK     /78 (BP Location: Right arm, Patient Position: Sitting, Cuff Size: Adult Regular)   Pulse 72   Resp 16   SpO2 98%   Lizet Collins

## 2025-03-18 ENCOUNTER — THERAPY VISIT (OUTPATIENT)
Dept: PHYSICAL THERAPY | Facility: CLINIC | Age: 70
End: 2025-03-18
Attending: STUDENT IN AN ORGANIZED HEALTH CARE EDUCATION/TRAINING PROGRAM
Payer: MEDICARE

## 2025-03-18 DIAGNOSIS — R26.89 BALANCE PROBLEMS: Primary | ICD-10-CM

## 2025-03-18 DIAGNOSIS — F40.298 FEAR OF FALLING: ICD-10-CM

## 2025-03-18 PROCEDURE — 97116 GAIT TRAINING THERAPY: CPT | Mod: GP | Performed by: PHYSICAL THERAPIST

## 2025-03-18 PROCEDURE — 97530 THERAPEUTIC ACTIVITIES: CPT | Mod: GP | Performed by: PHYSICAL THERAPIST

## 2025-03-20 ENCOUNTER — TELEPHONE (OUTPATIENT)
Dept: NEUROLOGY | Facility: CLINIC | Age: 70
End: 2025-03-20
Payer: MEDICARE

## 2025-03-20 NOTE — TELEPHONE ENCOUNTER
Spoke with patient today and she is not interested in scheduling her [facility] now. She is going to see another neurologist in June and will see what they say.     Leslie Hanson on 3/20/2025 at 3:42 PM

## 2025-03-25 ENCOUNTER — THERAPY VISIT (OUTPATIENT)
Dept: PHYSICAL THERAPY | Facility: CLINIC | Age: 70
End: 2025-03-25
Attending: STUDENT IN AN ORGANIZED HEALTH CARE EDUCATION/TRAINING PROGRAM
Payer: MEDICARE

## 2025-03-25 DIAGNOSIS — R26.89 BALANCE PROBLEMS: Primary | ICD-10-CM

## 2025-03-25 DIAGNOSIS — F40.298 FEAR OF FALLING: ICD-10-CM

## 2025-03-25 PROCEDURE — 97116 GAIT TRAINING THERAPY: CPT | Mod: GP | Performed by: PHYSICAL THERAPIST

## 2025-03-25 PROCEDURE — 97530 THERAPEUTIC ACTIVITIES: CPT | Mod: GP | Performed by: PHYSICAL THERAPIST

## 2025-03-25 PROCEDURE — 97110 THERAPEUTIC EXERCISES: CPT | Mod: GP | Performed by: PHYSICAL THERAPIST

## 2025-04-01 ENCOUNTER — OFFICE VISIT (OUTPATIENT)
Dept: DERMATOLOGY | Facility: CLINIC | Age: 70
End: 2025-04-01
Payer: MEDICARE

## 2025-04-01 DIAGNOSIS — D49.2 NEOPLASM OF UNSPECIFIED BEHAVIOR OF BONE, SOFT TISSUE, AND SKIN: ICD-10-CM

## 2025-04-01 DIAGNOSIS — Z80.8 FAMILY HISTORY OF MALIGNANT MELANOMA: Primary | ICD-10-CM

## 2025-04-01 DIAGNOSIS — L57.0 ACTINIC KERATOSES: ICD-10-CM

## 2025-04-01 PROCEDURE — 88305 TISSUE EXAM BY PATHOLOGIST: CPT | Mod: 26 | Performed by: PATHOLOGY

## 2025-04-01 PROCEDURE — 88305 TISSUE EXAM BY PATHOLOGIST: CPT | Mod: TC | Performed by: PHYSICIAN ASSISTANT

## 2025-04-01 ASSESSMENT — PAIN SCALES - GENERAL: PAINLEVEL_OUTOF10: NO PAIN (0)

## 2025-04-01 NOTE — PATIENT INSTRUCTIONS
Wound Care After a Biopsy    What is a skin biopsy?  A skin biopsy allows the doctor to examine a very small piece of tissue under the microscope to determine the diagnosis and the best treatment for the skin condition. A local anesthetic (numbing medicine) is injected with a very small needle into the skin area to be tested. A small piece of skin is taken from the area. Sometimes a suture (stitch) is used.     What are the risks of a skin biopsy?  I will experience scar, bleeding, swelling, pain, crusting and redness. I may experience incomplete removal or recurrence. Risks of this procedure are excessive bleeding, bruising, infection, nerve damage, numbness, thick (hypertrophic or keloidal) scar and non-diagnostic biopsy.    How should I care for my wound for the first 24 hours?  Keep the wound dry and covered for 24 hours  If it bleeds, hold direct pressure on the area for 15 minutes. If bleeding does not stop, call us or go to the emergency room  Avoid strenuous exercise the first 1-2 days or as your doctor instructs you    How should I care for the wound after 24 hours?  After 24 hours, remove the bandage  You may bathe or shower as normal  If you had a scalp biopsy, you can shampoo as usual and can use shower water to clean the biopsy site daily  Clean the wound once a day with gentle soap and water  Do not scrub, be gentle  Apply white petroleum/Vaseline after cleaning the wound with a cotton swab or a clean finger, and keep the site covered with a Bandaid /bandage. Bandages are not necessary with a scalp biopsy  If you are unable to cover the site with a Bandaid /bandage, re-apply ointment 2-3 times a day to keep the site moist. Moisture will help with healing  Avoid strenuous activity for first 1-2 days  Avoid lakes, rivers, pools, and oceans until the stitches are removed or the site is healed    How do I clean my wound?  Wash hands thoroughly with soap or use hand  before all wound care  Clean  the wound with gentle soap and water  Apply white petroleum/Vaseline  to wound after it is clean  Replace the Bandaid /bandage to keep the wound covered for the first few days or as instructed by your doctor  If you had a scalp biopsy, warm shower water to the area on a daily basis should suffice    What should I use to clean my wound?   Cotton-tipped applicators (Qtips )  White petroleum jelly (Vaseline ). Use a clean new container and use Q-tips to apply.  Bandaids  as needed  Gentle soap     How should I care for my wound long term?  Do not get your wound dirty  Keep up with wound care for one week or until the area is healed.  If you have stitches, stitches need to be removed in 14 days. You may return to our clinic for this or you may have it done locally at your doctor s office.  A small scab will form and fall off by itself when the area is completely healed. The area will be red and will become pink in color as it heals. Sun protection is very important for how your scar will turn out. Sunscreen with an SPF 30 or greater is recommended once the area is healed.  You should have some soreness but it should be mild and slowly go away over several days. Talk to your doctor about using tylenol for pain,    When should I call my doctor?  If you have increased:   Pain or swelling  Pus or drainage (clear or slightly yellow drainage is ok)  Temperature over 100F  Spreading redness or warmth around wound    When will I hear about my results?  The biopsy results can take 2 weeks to come back.  Your results will automatically release to [facility] before your provider has even reviewed them.  The clinic will call you with the results, send you a [facility] message, or have you schedule a follow-up clinic or phone time to discuss the results.  Contact our clinics if you do not hear from us in 2 weeks.    Who should I call with questions?  [facility]: [phone]  [facility]  [facility]: [phone]  For urgent needs outside of business hours call the [facility] at [phone] and ask for the dermatology resident on call

## 2025-04-01 NOTE — LETTER
4/1/2025       RE: Emperatriz Esteban  [address]  [place] [address]     Dear Colleague,    Thank you for referring your patient, Emperatriz Esteban, to the [facility]ERMAT[facility] [place] at [facility]. Please see a copy of my visit note below.    [facility] Dermatology Note  Encounter Date: Apr 1, 2025  Office Visit     Reviewed patients past medical history and pertinent chart review prior to patients visit today.     Dermatology Problem List:  # NUB, left neck, shave biopsy 4/1/2025   # Actinic keratoses  - cryotherapy     Family history of melanoma, brother.  ____________________________________________    Assessment & Plan:     # Neoplasm of uncertain behavior:  left neck  DDx includes BCC vs other. Shave biopsy today.    Procedure Note: Biopsy by shave technique  The risks and benefits of the procedure were described to the patient. These include but are not limited to bleeding, infection, scar, incomplete removal, and non-diagnostic biopsy. Verbal informed consent was obtained. The above site(s) was cleansed with an alcohol pad and injected with 1% lidocaine with epinephrine. Once anesthesia was obtained, a biopsy(ies) was performed with Gilette blade. The tissue(s) was placed in a labeled container(s) with formalin and sent to pathology. Hemostasis was achieved with aluminum chloride. Vaseline and a bandage were applied to the wound(s). The patient tolerated the procedure well and was given post biopsy care instructions.    #Actinic keratosis x3  - We discussed the precancerous nature of the skin lesions.  I recommended liquid nitrogen cryotherapy and the patient was agreeable.      Cryotherapy procedure note, location(s): right cheek x2, right temple x1 After verbal consent and discussion of risks and benefits including, but not limited to, dyspigmentation/scar, blister, and pain, the lesion(s) was(were) treated  with 1-2 mm freeze border for 1-2 cycles with liquid nitrogen. Post cryotherapy instructions were provided.    # Family history malignant melanoma  - Discussed importance of annual full skin cancer screening given increased risk of skin cancer. Skin check declined today.     Follow up annually, sooner with concerns.     All risks, benefits and alternatives were discussed with patient.  Patient is in agreement and understands the assessment and plan.  All questions were answered.  Whit Harper PA-C  [facility] Dermatology  _______________________________________    CC: Skin Check (Eulalia is here today for a lesion of concern on her face )    HPI:  Ms. Emperatriz Esteban is a(n) 70 year old female who presents today as a new patient for a lesion on the right cheek. This has been present for months. No growth or changes over time. She notes it is flaky, but asymptomatic. The patient declines a full skin cancer screening, instead requests evaluation of the face and neck. Patient is otherwise feeling well, without additional skin concerns.      Physical Exam:  SKIN: Focused examination of face and neck was performed.  - Pink macule(s) with gritty scale involving the right cheek and right temple, consistent with actinic keratosis.   - Involving the left neck is a 0.4 x 0.4 cm pink shiny papule with blue globule, arborizing telangiectasias.     - No other lesions of concern on areas examined.     Medications:  Current Outpatient Medications   Medication Sig Dispense Refill     ARIPiprazole (ABILIFY) 5 MG tablet Take 1 tablet (5 mg) by mouth daily 90 tablet 1     Ascorbic Acid (VITAMIN C PO) Take 500 mg by mouth daily.       atorvastatin (LIPITOR) 20 MG tablet Take 1 tablet (20 mg) by mouth daily 90 tablet 0     brimonidine (ALPHAGAN) 0.2 % ophthalmic solution Place 1 drop into both eyes 2 times daily. 10 mL 11     calcium carbonate 750 MG CHEW Take 750 mg by mouth 2 times daily.       Cholecalciferol (VITAMIN D-3 PO)  1 tablet daily - possibly 1000 25mcg daily       dorzolamide-timolol (COSOPT) 2-0.5 % ophthalmic solution Place 1 drop into both eyes 2 times daily. 10 mL 11     gabapentin (NEURONTIN) 100 MG capsule 1 tablet once a day as needed       lamoTRIgine (LAMICTAL) 150 MG tablet Take 2 tablets (300 mg) by mouth at bedtime 180 tablet 1     levothyroxine (SYNTHROID/LEVOTHROID) 25 MCG tablet Take 1 tablet (25 mcg) by mouth daily 90 tablet 3     Multiple Vitamin (MULTIVITAMIN ADULT PO) Take by mouth. Daily       multivitamin (OCUVITE) TABS Take 1 tablet by mouth daily. 30 each 0     Omega-3 Fatty Acids (FISH OIL PO) 1 in the morning and 1 at night.       omeprazole-sodium bicarbonate  MG CAPS per capsule As needed       traZODone (DESYREL) 50 MG tablet Take 1-2 tablets ( mg) by mouth nightly as needed for sleep 180 tablet 1     VITAMIN D PO Take by mouth. Dose? 1000 units 25mcg       No current facility-administered medications for this visit.      Past Medical History:   Patient Active Problem List   Diagnosis     Bipolar affective disorder (H)     Other specified hypothyroidism     Prediabetes     Impaired fasting glucose     Other specified glaucoma     Bilateral low back pain without sciatica     Both eyes affected by degenerative myopia with macular hole     Bipolar affective disorder, currently depressed, moderate (H)     Cerebrovascular disease     Closed fracture of left orbit, initial encounter (H)     Dizziness     Fall from standing, initial encounter     Family history of malignant neoplasm of ovary     Fatigue     Fibrocystic disease of breast     VAL (generalized anxiety disorder)     Hyperglycemia     Incoordination     Mixed hyperlipidemia     Hyperlipidemia     Menopausal and postmenopausal disorder     Menopausal syndrome     Open fracture of frontal bone, initial encounter (H)     Other specified symptoms and signs involving the circulatory and respiratory systems     Polyneuropathy     Pure  hypercholesterolemia     Traumatic brain injury, without loss of consciousness, initial encounter (H)     SAH (subarachnoid hemorrhage) (H)     Uncomplicated asthma     Gastroesophageal reflux disease without esophagitis     Hypothyroidism, unspecified type     Glaucoma     Family history of Parkinson disease     Cerebral hemorrhage (H)     Cerebral ventriculomegaly     Congestion of respiratory tract     Cough     Degenerative progressive high myopia     Disuse syndrome     Fear of falling     History of bronchitis     History of cerebrovascular accident     History of fall     Low back pain     Tremor     Impairment of balance     History of subarachnoid hemorrhage     Bipolar disorder (H)     Hypothyroidism     Traumatic brain injury without loss of consciousness (H)     Past Medical History:   Diagnosis Date     Bipolar 1 disorder (H)      Family history of Parkinson disease 11/26/2024       CC Referred Self, MD  No address on file on close of this encounter.       Again, thank you for allowing me to participate in the care of your patient.      Sincerely,    Whit Harper PA-C

## 2025-04-01 NOTE — PROGRESS NOTES
[facility] Dermatology Note  Encounter Date: Apr 1, 2025  Office Visit     Reviewed patients past medical history and pertinent chart review prior to patients visit today.     Dermatology Problem List:  # NUB, left neck, shave biopsy 4/1/2025   # Actinic keratoses  - cryotherapy     Family history of melanoma, brother.  ____________________________________________    Assessment & Plan:     # Neoplasm of uncertain behavior:  left neck  DDx includes BCC vs other. Shave biopsy today.    Procedure Note: Biopsy by shave technique  The risks and benefits of the procedure were described to the patient. These include but are not limited to bleeding, infection, scar, incomplete removal, and non-diagnostic biopsy. Verbal informed consent was obtained. The above site(s) was cleansed with an alcohol pad and injected with 1% lidocaine with epinephrine. Once anesthesia was obtained, a biopsy(ies) was performed with Gilette blade. The tissue(s) was placed in a labeled container(s) with formalin and sent to pathology. Hemostasis was achieved with aluminum chloride. Vaseline and a bandage were applied to the wound(s). The patient tolerated the procedure well and was given post biopsy care instructions.    #Actinic keratosis x3  - We discussed the precancerous nature of the skin lesions.  I recommended liquid nitrogen cryotherapy and the patient was agreeable.      Cryotherapy procedure note, location(s): right cheek x2, right temple x1 After verbal consent and discussion of risks and benefits including, but not limited to, dyspigmentation/scar, blister, and pain, the lesion(s) was(were) treated with 1-2 mm freeze border for 1-2 cycles with liquid nitrogen. Post cryotherapy instructions were provided.    # Family history malignant melanoma  - Discussed importance of annual full skin cancer screening given increased risk of skin cancer. Skin check declined today.     Follow up annually, sooner with concerns.      All risks, benefits and alternatives were discussed with patient.  Patient is in agreement and understands the assessment and plan.  All questions were answered.  Whit Harper PA-C  [facility] Dermatology  _______________________________________    CC: Skin Check (Eulalia is here today for a lesion of concern on her face )    HPI:  Ms. Emperatriz Esteban is a(n) 70 year old female who presents today as a new patient for a lesion on the right cheek. This has been present for months. No growth or changes over time. She notes it is flaky, but asymptomatic. The patient declines a full skin cancer screening, instead requests evaluation of the face and neck. Patient is otherwise feeling well, without additional skin concerns.      Physical Exam:  SKIN: Focused examination of face and neck was performed.  - Pink macule(s) with gritty scale involving the right cheek and right temple, consistent with actinic keratosis.   - Involving the left neck is a 0.4 x 0.4 cm pink shiny papule with blue globule, arborizing telangiectasias.     - No other lesions of concern on areas examined.     Medications:  Current Outpatient Medications   Medication Sig Dispense Refill    ARIPiprazole (ABILIFY) 5 MG tablet Take 1 tablet (5 mg) by mouth daily 90 tablet 1    Ascorbic Acid (VITAMIN C PO) Take 500 mg by mouth daily.      atorvastatin (LIPITOR) 20 MG tablet Take 1 tablet (20 mg) by mouth daily 90 tablet 0    brimonidine (ALPHAGAN) 0.2 % ophthalmic solution Place 1 drop into both eyes 2 times daily. 10 mL 11    calcium carbonate 750 MG CHEW Take 750 mg by mouth 2 times daily.      Cholecalciferol (VITAMIN D-3 PO) 1 tablet daily - possibly 1000 25mcg daily      dorzolamide-timolol (COSOPT) 2-0.5 % ophthalmic solution Place 1 drop into both eyes 2 times daily. 10 mL 11    gabapentin (NEURONTIN) 100 MG capsule 1 tablet once a day as needed      lamoTRIgine (LAMICTAL) 150 MG tablet Take 2 tablets (300 mg) by mouth at bedtime 180 tablet  1    levothyroxine (SYNTHROID/LEVOTHROID) 25 MCG tablet Take 1 tablet (25 mcg) by mouth daily 90 tablet 3    Multiple Vitamin (MULTIVITAMIN ADULT PO) Take by mouth. Daily      multivitamin (OCUVITE) TABS Take 1 tablet by mouth daily. 30 each 0    Omega-3 Fatty Acids (FISH OIL PO) 1 in the morning and 1 at night.      omeprazole-sodium bicarbonate  MG CAPS per capsule As needed      traZODone (DESYREL) 50 MG tablet Take 1-2 tablets ( mg) by mouth nightly as needed for sleep 180 tablet 1    VITAMIN D PO Take by mouth. Dose? 1000 units 25mcg       No current facility-administered medications for this visit.      Past Medical History:   Patient Active Problem List   Diagnosis    Bipolar affective disorder (H)    Other specified hypothyroidism    Prediabetes    Impaired fasting glucose    Other specified glaucoma    Bilateral low back pain without sciatica    Both eyes affected by degenerative myopia with macular hole    Bipolar affective disorder, currently depressed, moderate (H)    Cerebrovascular disease    Closed fracture of left orbit, initial encounter (H)    Dizziness    Fall from standing, initial encounter    Family history of malignant neoplasm of ovary    Fatigue    Fibrocystic disease of breast    VAL (generalized anxiety disorder)    Hyperglycemia    Incoordination    Mixed hyperlipidemia    Hyperlipidemia    Menopausal and postmenopausal disorder    Menopausal syndrome    Open fracture of frontal bone, initial encounter (H)    Other specified symptoms and signs involving the circulatory and respiratory systems    Polyneuropathy    Pure hypercholesterolemia    Traumatic brain injury, without loss of consciousness, initial encounter (H)    SAH (subarachnoid hemorrhage) (H)    Uncomplicated asthma    Gastroesophageal reflux disease without esophagitis    Hypothyroidism, unspecified type    Glaucoma    Family history of Parkinson disease    Cerebral hemorrhage (H)    Cerebral ventriculomegaly     Congestion of respiratory tract    Cough    Degenerative progressive high myopia    Disuse syndrome    Fear of falling    History of bronchitis    History of cerebrovascular accident    History of fall    Low back pain    Tremor    Impairment of balance    History of subarachnoid hemorrhage    Bipolar disorder (H)    Hypothyroidism    Traumatic brain injury without loss of consciousness (H)     Past Medical History:   Diagnosis Date    Bipolar 1 disorder (H)     Family history of Parkinson disease 11/26/2024       CC Referred Self, MD  No address on file on close of this encounter.

## 2025-04-01 NOTE — NURSING NOTE
Dermatology Rooming Note    Emperatriz Esteban's goals for this visit include:   Chief Complaint   Patient presents with    Skin Check     Eulalia is here today for a lesion of concern on her face      Jesus LOPEZ CMA

## 2025-04-01 NOTE — NURSING NOTE
Lidocaine-epinephrine 1-1:748348 % injection   1mL once for one use, starting 4/1/2025 ending 4/1/2025,  2mL disp, R-0, injection  Injected by Jesus LOPEZ CMA

## 2025-04-02 ENCOUNTER — TELEPHONE (OUTPATIENT)
Dept: DERMATOLOGY | Facility: CLINIC | Age: 70
End: 2025-04-02
Payer: MEDICARE

## 2025-04-02 ENCOUNTER — THERAPY VISIT (OUTPATIENT)
Dept: PHYSICAL THERAPY | Facility: CLINIC | Age: 70
End: 2025-04-02
Attending: STUDENT IN AN ORGANIZED HEALTH CARE EDUCATION/TRAINING PROGRAM
Payer: MEDICARE

## 2025-04-02 DIAGNOSIS — R26.89 BALANCE PROBLEMS: Primary | ICD-10-CM

## 2025-04-02 DIAGNOSIS — F40.298 FEAR OF FALLING: ICD-10-CM

## 2025-04-02 LAB
PATH REPORT.COMMENTS IMP SPEC: NORMAL
PATH REPORT.COMMENTS IMP SPEC: NORMAL
PATH REPORT.FINAL DX SPEC: NORMAL
PATH REPORT.GROSS SPEC: NORMAL
PATH REPORT.MICROSCOPIC SPEC OTHER STN: NORMAL
PATH REPORT.RELEVANT HX SPEC: NORMAL

## 2025-04-02 PROCEDURE — 97112 NEUROMUSCULAR REEDUCATION: CPT | Mod: GP | Performed by: PHYSICAL THERAPIST

## 2025-04-02 PROCEDURE — 97116 GAIT TRAINING THERAPY: CPT | Mod: GP | Performed by: PHYSICAL THERAPIST

## 2025-04-02 PROCEDURE — 97530 THERAPEUTIC ACTIVITIES: CPT | Mod: GP | Performed by: PHYSICAL THERAPIST

## 2025-04-02 NOTE — TELEPHONE ENCOUNTER
4/2 Patient confirmed scheduled appointment:  Date: 4/3/25  Time: 2:15pm  Visit type: Return Dermatology  Provider: Meredith  Location: [facility]  Testing/imaging: na  Additional notes: na

## 2025-04-07 ENCOUNTER — TELEPHONE (OUTPATIENT)
Dept: DERMATOLOGY | Facility: CLINIC | Age: 70
End: 2025-04-07
Payer: MEDICARE

## 2025-04-07 DIAGNOSIS — C44.41 BASAL CELL CARCINOMA (BCC) OF LEFT SIDE OF NECK: Primary | ICD-10-CM

## 2025-04-07 NOTE — TELEPHONE ENCOUNTER
I called the patient to discuss the results of the biopsy obtained from the left neck on 04/01/2025. The results demonstrated superficial and nodular basal cell carcinoma. This is a type of skin cancer that requires further treatment. I recommended a Mohs surgery. The patient had no further questions.

## 2025-04-08 ENCOUNTER — TELEPHONE (OUTPATIENT)
Dept: DERMATOLOGY | Facility: CLINIC | Age: 70
End: 2025-04-08
Payer: MEDICARE

## 2025-04-08 NOTE — TELEPHONE ENCOUNTER
Left patient a voicemail to schedule a consult & mohs for   snBCC, left neck        with Dr. Steven. Provided my direct phone number.    Gilma Moralesied on 4/8/2025 at 10:45 AM

## 2025-04-16 ENCOUNTER — TELEPHONE (OUTPATIENT)
Dept: NEUROLOGY | Facility: CLINIC | Age: 70
End: 2025-04-16
Payer: MEDICARE

## 2025-04-16 NOTE — TELEPHONE ENCOUNTER
Left Voicemail (1st Attempt) and Sent Mychart (1st Attempt) for the patient to call back and schedule the following:    Appointment type: New Neuro  Provider: Dr. Candelario  Return date: 5/14/25 at 12:30  Specialty phone number: [phone]  Additional appointment(s) needed: NA  Additonal Notes:       Please reschedule the pt's appt with Dr. Candelario to 5/14/25 at 12:30 for 60 mins, or to the next available opening. Will need to manually schedule in held spot

## 2025-04-25 NOTE — TELEPHONE ENCOUNTER
FUTURE VISIT INFORMATION      FUTURE VISIT INFORMATION:  Date: 6.11.25  Time: 9:00  Location[place] [facility] [place]  REFERRAL INFORMATION:  Referring provider:  Meredith  Referring providers clinic:  Derm  Reason for visit/diagnosis  snBCC, left neck      RECORDS REQUESTED FROM:       Clinic name Comments Records Status Imaging Status   [facility] 4.1.25  Path #[id number] Epic Epic

## 2025-04-28 NOTE — PROGRESS NOTES
[facility]    Neurology Consult    [id number]      Emperatriz Esteban MRN# [id number]   YOB: 1955 Age: 70 year old      Primary care provider:   Niurka Lion    Requesting provider:   Alejandra Jacobo    Reason for Consult:  Imbalance    IMPRESSIONS:  Emperatriz Esteban is a 70 year old female with a past medical history of 2 unexplained episodes of inability to control gait in 2019 but whose balance became significantly worse after a traumatic left frontal SAH and left orbital roof fracture from a mechanical fall (2-12-24).  There was concern for NPH on 10-3-24 MRI, history of bipolar disorder on dopamine blocker (aripiprazole), and since this event has had a strong feel of falling and anxiety about walking in unfamiliar places. Given a description of a rocking type vertigo, she was referred to me.  Balance, gait, and confidence on a flat carpeted surface is fine.  However, she has significant anxiety walking outside her home, on uneven ground, concrete, and with stairs.  The stairs are particularly difficult because of her extreme myopia and impairment of depth perception.  She has dizziness with moving her head from side-to-side.     She has very abnormal head position.  Her head is pitched forward and also shifted to the left.  She has at least moderately severe upper thoracic kyphosis.  She has positive bilateral upper limb tension tests and tenderness over C1 as well as in the upper thoracic area.  She has right arm cogwheeling rigidity as well as right arm reduced swing.    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  She is very afraid of falling and has a conditioned aversion to walking  outdoors.  She may have developed venous outflow obstruction due to her extremely poor posture and has an element of cervical dystonia that can be leading to secondary venous compression which can contribute to the NPH.      Recommendations:  US TOS/internal jugular vein   Referral for evaluation of NPH  Vestibular rehab for gait and balance training    HISTORY OF PRESENT ILLNESS:  Emperatriz Esteban is a 70 year old female with a past medical history of the following:    DIZZINESS: Some degree of imbalance started around 2019. She had two instances a couple weeks apart when she couldn't walk straight in the morning, had to walk in a zig-zag briefly. Balance got worse after a fall on 2-12-24. She then developed a fear of falling. She then developed a more chronic balance problems. From her recollection, it might have been due to her shoes. She hit her head and sustained a small left frontal SAH and a orbital fracture on 2-12-24. She has not been able to walk the dog since Fall of 2023.     She fell 2 days ago, and had a small laceration over her right eyebrow. She recalls just sandrine over while sitting in a chair and trying to get up. There was no dizziness, vertigo, lightheaded, or feeling pushed when she feel. She is fine walking on carpet. She has a lot of difficult on concrete, grass, and brick. She doesn't have problems indoors. She is extremely myopic and she has a proliferative retinopathy. She can read with large print on her Merle.     Spinning vertigo: No  Rocking vertigo: This can come and go. She can walk around a flat atrium very well. She has difficulty walk up or down stairs because of visual problems and no depth perceptions.   Triggers: Moving head from side to side. R=L. Looking up or down is fine.   Last VNG: None    AURAL SYMPTOMS:  Tinnitus: None  Ear pressure: None  Hearing loss: None  Last audiogram: [facility]    HEADACHE: Occasionally, never had migraines. No head pressure or congestion.    Aura: None  Last brain imaging:  CT HEAD W/O CONTRAST 2-12-24  Impression:  1. Subtle subarachnoid hemorrhage and possible tiny contusion in  the inferior left frontal lobe. Six-hour follow-up head CT is recommended.  2. Left orbital roof fracture with hairline fracture extending into the left frontal bone with associated large frontal scalp hematoma.    MR BRAIN W/O & W CONTRAST 10/3/2024   IMPRESSION:  1. No acute intracranial pathology.  2. Mild ventriculomegaly out of proportion to the cerebral sulci  suggestive of underlying normal pressure hydrocephalus.     NECK/UPPER EXT SYMPTOMS:  Neck pain: Yes, some soreness, posteriorly.   Shoulder pain: No  Arm pain: No  Hand pain: No  Hand weakness: No  Numbness/tingling hands: No  Color change hands: No  Swelling hands: No  Last EMG: None    BULBAR SYMPTOMS:  Dysphagia: Yes, can't remember when it started. Not food specific. Even with saliva. Eating is okay.   Throat pressure: No  Chronic cough: No    CARDIAC:  Chest pain: No  Shortness of breath: Not with exercise. But, she can have a difficult time with deep breaths.   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   Memory: Sometimes forgets what she was about to do. Episodic. No executive function problems.  Attention: Sometimes while reading.   Processing speed: okay  Not declining the way the balance is.    OTHER:  Pelvic pressure: No  Rectal pressure: No  Hematuria: No, incontinence  Swelling in feet: No    ACTIVITY:  Concussions: 2-12-24, 4-28-25  MVA: None  Neck wires: None  Exercise: Walking, no racket sports, no weight lifting, stopped swimming.    PAST MEDICAL HISTORY:  Past Medical History:   Diagnosis Date    Bipolar 1 disorder (H)     Family history of Parkinson disease 11/26/2024      PAST SURGICAL HISTORY:  Past Surgical History:   Procedure Laterality Date    CATARACT IOL, RT/LT Bilateral 1997    VITRECTOMY  ANTERIOR Right 2019    for floaters      SOCIAL HISTORY:  Social History     Socioeconomic History    Marital status:      Spouse name: Not on file    Number of children: Not on file    Years of education: Not on file    Highest education level: Not on file   Occupational History    Not on file   Tobacco Use    Smoking status: Former     Current packs/day: 0.00     Types: Cigarettes     Quit date: 1997     Years since quittin.3     Passive exposure: Never    Smokeless tobacco: Never   Vaping Use    Vaping status: Never Used   Substance and Sexual Activity    Alcohol use: Yes     Alcohol/week: 4.0 - 5.0 standard drinks of alcohol     Types: 4 - 5 Standard drinks or equivalent per week    Drug use: No    Sexual activity: Not Currently   Other Topics Concern    Not on file   Social History Narrative    Not on file     Social Drivers of Health     Financial Resource Strain: Not on file   Food Insecurity: Not on file   Transportation Needs: Not on file   Physical Activity: Not on file   Stress: Not on file   Social Connections: Not on file   Interpersonal Safety: Not on file   Housing Stability: Not on file         ALLERGIES:  Allergies   Allergen Reactions    No Clinical Screening - See Comments Other (See Comments)     Steroids- contraindicated, has glaucoma per pt    Other (Do Not Use)      Other Reaction(s): STEROIDS(SENSITIVITY) - pressure in eyes    Prednisone Other (See Comments)     Has glaucoma, potential for ocular pressure spike.  She has never been on steroids.    Other Reaction(s): Unknown    prednisone     MEDICATIONS:    Current Outpatient Medications:     ARIPiprazole (ABILIFY) 5 MG tablet, Take 1 tablet (5 mg) by mouth daily, Disp: 90 tablet, Rfl: 1    Ascorbic Acid (VITAMIN C PO), Take 500 mg by mouth daily., Disp: , Rfl:     atorvastatin (LIPITOR) 20 MG tablet, Take 1 tablet (20 mg) by mouth daily, Disp: 90 tablet, Rfl: 0    brimonidine (ALPHAGAN) 0.2 % ophthalmic solution, Place 1 drop  into both eyes 2 times daily., Disp: 10 mL, Rfl: 11    calcium carbonate 750 MG CHEW, Take 750 mg by mouth 2 times daily., Disp: , Rfl:     Cholecalciferol (VITAMIN D-3 PO), 1 tablet daily - possibly 1000 25mcg daily, Disp: , Rfl:     dorzolamide-timolol (COSOPT) 2-0.5 % ophthalmic solution, Place 1 drop into both eyes 2 times daily., Disp: 10 mL, Rfl: 11    gabapentin (NEURONTIN) 100 MG capsule, 1 tablet once a day as needed, Disp: , Rfl:     lamoTRIgine (LAMICTAL) 150 MG tablet, Take 2 tablets (300 mg) by mouth at bedtime, Disp: 180 tablet, Rfl: 1    levothyroxine (SYNTHROID/LEVOTHROID) 25 MCG tablet, Take 1 tablet (25 mcg) by mouth daily, Disp: 90 tablet, Rfl: 3    Multiple Vitamin (MULTIVITAMIN ADULT PO), Take by mouth. Daily, Disp: , Rfl:     multivitamin (OCUVITE) TABS, Take 1 tablet by mouth daily., Disp: 30 each, Rfl: 0    Omega-3 Fatty Acids (FISH OIL PO), 1 in the morning and 1 at night., Disp: , Rfl:     omeprazole-sodium bicarbonate  MG CAPS per capsule, As needed, Disp: , Rfl:     traZODone (DESYREL) 50 MG tablet, Take 1-2 tablets ( mg) by mouth nightly as needed for sleep, Disp: 180 tablet, Rfl: 1    VITAMIN D PO, Take by mouth. Dose? 1000 units 25mcg, Disp: , Rfl:      PHYSICAL EXAM:  Vitals: BP (!) 150/93 (BP Location: Left arm, Patient Position: Sitting, Cuff Size: Adult Regular)   Pulse 74   SpO2 96%     General: Patient is well-nourished, well-groomed, in no apparent distress. Presents alone    HEENT: 1.5cm laceration on the right eyebrow. Healing well. Has bruising around the right eye. Throat clear, neck supple. Head is positioned to the left. There is moderately severe kyphosis and forward head position. Shoulders are very rolled forward, can not be reduced.   Ext: Warm, well-perfused. Some non-pitting edema of the left leg. Good pulses.     Neurologic:  Mental Status: Alert and oriented to person, place, time, and situation.  Able to provide an excellent history.     Cranial  Nerves: Visual fields full to confrontation. Pupils equal and reactive to light.  Restricted lateral gaze bilaterally. Breakdown of smooth pursuit. No nystagmus.   Face sensation normal.  Normal head impulse testing.  Normal hearing to finger rub. Face symmetric with normal movements. Tongue and palate midline.  Normal shoulder elevation.      Motor: Right arm rigidity with cogwheeling.  No pronator drift.  Normal foot taps.  Full strength to confrontational testing.    Sensory: Normal light touch, temperature, and vibration except for reduced vibratory sense in the left leg where there swelling.     Reflexes: Biceps, Brachioradialis, Triceps, Knees, Ankles 2/4.     Coordination: Normal finger to nose, rapid alternating movements    Gait: Normal stance width.  Negative Romberg.  Good gait initiation.  Good stride length.  Arm swing is down the right.  Normal turn with 2 steps.       Upper Limb Tension Test for Thoracic Outlet Syndrome:  Arms abducted: Numbness and tingling develop in none    Arms abducted head turned to the RIGHT:   Right hand gets worse   Left hand gets   Arms abducted head turned to the LEFT:   Left hand gets    Right hand gets worse    Arms abducted head tilt to the RIGHT:   Right hand gets worse   Left hand gets   Arms abducted head tilt to the LEFT:   Left hand gets worse   Right hand gets     Pain Right scalene: yes, radiation  Pain Left scalene: No  Pain Right sternocleidomastoid: No  Pain Left sternocleidomastoid: No    Pain under the RIGHT pectoralis minor tendon: No  Pain at the RIGHT 1st rib-sternum insertion site: Yes, no radiation  Pain under the LEFT pectoralis minor tendon: Yes, no radiation  Pain at the LEFT 1st rib-sternum insertion site: yes, no radiation    Bilateral C1 both tender.      DATA:  All available and relevant labs, imaging, and other procedures were reviewed personally.   Last brain imaging:  MRI Brain w & w/o contrast  Narrative: EXAM: MR BRAIN W/O & W CONTRAST   10/3/2024 6:50 PM     HISTORY:  Balance problems       COMPARISON:  Head CT from an outside institution 2/21/2024.    TECHNIQUE: Sagittal T1-weighted, coronal T2-weighted, axial T2 FLAIR,  axial susceptibility weighted, and axial diffusion-weighted with ADC  map images of the brain were obtained without intravenous contrast.  After the administration of intravenous contrast axial and coronal  fat-suppressed T1-weighted weighted images were obtained.    CONTRAST: 7.5mL Gadavist.    FINDINGS:  There is no mass effect, midline shift, or intracranial hemorrhage.  The ventricles are enlarged mildly out of proportion to the cerebral  sulci. No narrowing of the callosal angle or particular crowding of  the sulci at the cranial vertex. No abnormal diffusion restriction.  Punctate focus of susceptibility effect in the anterior left frontal  lobe consistent with chronic microhemorrhage. Minimal leukoaraiosis.  Tiny peripheral old left cerebellar infarct. Major intracranial  vascular structures are within normal limits.    No abnormal postcontrast enhancement.    No suspicious abnormality of the skull marrow signal. Small left  maxillary sinus mucus retention cyst. Mastoid air cells are clear. No  focal abnormality of the pituitary gland, sella, skull base and upper  cervical spinal structures on sagittal images. Bilateral pseudophakia  and bilateral staphyloma, unchanged.  Impression: IMPRESSION:  1. No acute intracranial pathology.  2. Mild ventriculomegaly out of proportion to the cerebral sulci  suggestive of underlying normal pressure hydrocephalus.    AMBER CARROLL MD         SYSTEM ID:  [id number]      The longitudinal plan of care for the condition(s) below were addressed during this visit. Due to the added complexity in care, I will continue to support Eulalia in the subsequent management of this condition(s) and with the ongoing continuity of care of this condition(s).    60-minutes were spent in evaluation,  examination, and documentation on the date of service

## 2025-04-30 ENCOUNTER — OFFICE VISIT (OUTPATIENT)
Dept: NEUROLOGY | Facility: CLINIC | Age: 70
End: 2025-04-30
Payer: MEDICARE

## 2025-04-30 VITALS — DIASTOLIC BLOOD PRESSURE: 93 MMHG | SYSTOLIC BLOOD PRESSURE: 150 MMHG | OXYGEN SATURATION: 96 % | HEART RATE: 74 BPM

## 2025-04-30 DIAGNOSIS — G91.3 POST-TRAUMATIC HYDROCEPHALUS (H): Primary | ICD-10-CM

## 2025-04-30 DIAGNOSIS — I87.1 VEIN COMPRESSION: ICD-10-CM

## 2025-04-30 DIAGNOSIS — R26.89 BALANCE PROBLEMS: ICD-10-CM

## 2025-04-30 DIAGNOSIS — G24.3 CERVICAL DYSTONIA: ICD-10-CM

## 2025-04-30 DIAGNOSIS — G54.0 TOS (THORACIC OUTLET SYNDROME): ICD-10-CM

## 2025-04-30 DIAGNOSIS — H52.13 SEVERE MYOPIA OF BOTH EYES: ICD-10-CM

## 2025-04-30 PROCEDURE — 99215 OFFICE O/P EST HI 40 MIN: CPT | Performed by: PSYCHIATRY & NEUROLOGY

## 2025-04-30 PROCEDURE — 3079F DIAST BP 80-89 MM HG: CPT | Performed by: PSYCHIATRY & NEUROLOGY

## 2025-04-30 PROCEDURE — 99417 PROLNG OP E/M EACH 15 MIN: CPT | Performed by: PSYCHIATRY & NEUROLOGY

## 2025-04-30 PROCEDURE — 1126F AMNT PAIN NOTED NONE PRSNT: CPT | Performed by: PSYCHIATRY & NEUROLOGY

## 2025-04-30 PROCEDURE — G2211 COMPLEX E/M VISIT ADD ON: HCPCS | Performed by: PSYCHIATRY & NEUROLOGY

## 2025-04-30 PROCEDURE — 3077F SYST BP >= 140 MM HG: CPT | Performed by: PSYCHIATRY & NEUROLOGY

## 2025-04-30 ASSESSMENT — PAIN SCALES - GENERAL: PAINLEVEL_OUTOF10: NO PAIN (0)

## 2025-04-30 NOTE — LETTER
4/30/2025       RE: Emperatriz Esteban  [address]  [facility] [address]     Dear Colleague,    Thank you for referring your patient, Emperatriz Esteban, to the [facility] NEUROLOGY CLINIC [place] at [facility]. Please see a copy of my visit note below.    [facility]    Neurology Consult    4/302025      Emperatriz Esteban MRN# [id number]   YOB: 1955 Age: 70 year old      Primary care provider:   Niurka Lion    Requesting provider:   Alejandra Jacobo    Reason for Consult:  Imbalance    IMPRESSIONS:  Emperatriz Esteban is a 70 year old female with a past medical history of 2 unexplained episodes of inability to control gait in 2019 but whose balance became significantly worse after a traumatic left frontal SAH and left orbital roof fracture from a mechanical fall (2-12-24).  There was concern for NPH on 10-3-24 MRI, history of bipolar disorder on dopamine blocker (aripiprazole), and since this event has had a strong feel of falling and anxiety about walking in unfamiliar places. Given a description of a rocking type vertigo, she was referred to me.  Balance, gait, and confidence on a flat carpeted surface is fine.  However, she has significant anxiety walking outside her home, on uneven ground, concrete, and with stairs.  The stairs are particularly difficult because of her extreme myopia and impairment of depth perception.  She has dizziness with moving her head from side-to-side.     She has very abnormal head position.  Her head is pitched forward and also shifted to the left.  She has at least moderately severe upper thoracic kyphosis.  She has positive bilateral upper limb tension tests and tenderness over C1 as well as in the upper thoracic area.  She has right arm cogwheeling rigidity as well as right arm reduced swing.    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  She is very afraid of falling and has a conditioned aversion to walking outdoors.  She may have developed venous outflow obstruction due to her extremely poor posture and has an element of cervical dystonia that can be leading to secondary venous compression which can contribute to the NPH.      Recommendations:  US TOS/internal jugular vein   Referral for evaluation of NPH  Vestibular rehab for gait and balance training    HISTORY OF PRESENT ILLNESS:  Emperatriz Esteban is a 70 year old female with a past medical history of the following:    DIZZINESS: Some degree of imbalance started around 2019. She had two instances a couple weeks apart when she couldn't walk straight in the morning, had to walk in a zig-zag briefly. Balance got worse after a fall on 2-12-24. She then developed a fear of falling. She then developed a more chronic balance problems. From her recollection, it might have been due to her shoes. She hit her head and sustained a small left frontal SAH and a orbital fracture on 2-12-24. She has not been able to walk the dog since Fall of 2023.     She fell 2 days ago, and had a small laceration over her right eyebrow. She recalls just sandrine over while sitting in a chair and trying to get up. There was no dizziness, vertigo, lightheaded, or feeling pushed when she feel. She is fine walking on carpet. She has a lot of difficult on concrete, grass, and brick. She doesn't have problems indoors. She is extremely myopic and she has a proliferative retinopathy. She can read with large print on her Merle.     Spinning vertigo: No  Rocking vertigo: This can come and go. She can walk around a flat atrium very well. She has difficulty walk up or down stairs because of  visual problems and no depth perceptions.   Triggers: Moving head from side to side. R=L. Looking up or down is fine.   Last VNG: None    AURAL SYMPTOMS:  Tinnitus: None  Ear pressure: None  Hearing loss: None  Last audiogram: Normal at [facility]    HEADACHE: Occasionally, never had migraines. No head pressure or congestion.   Aura: None  Last brain imaging:  CT HEAD W/O CONTRAST 2-12-24  Impression:  1. Subtle subarachnoid hemorrhage and possible tiny contusion in  the inferior left frontal lobe. Six-hour follow-up head CT is recommended.  2. Left orbital roof fracture with hairline fracture extending into the left frontal bone with associated large frontal scalp hematoma.    MR BRAIN W/O & W CONTRAST 10/3/2024   IMPRESSION:  1. No acute intracranial pathology.  2. Mild ventriculomegaly out of proportion to the cerebral sulci  suggestive of underlying normal pressure hydrocephalus.     NECK/UPPER EXT SYMPTOMS:  Neck pain: Yes, some soreness, posteriorly.   Shoulder pain: No  Arm pain: No  Hand pain: No  Hand weakness: No  Numbness/tingling hands: No  Color change hands: No  Swelling hands: No  Last EMG: None    BULBAR SYMPTOMS:  Dysphagia: Yes, can't remember when it started. Not food specific. Even with saliva. Eating is okay.   Throat pressure: No  Chronic cough: No    CARDIAC:  Chest pain: No  Shortness of breath: Not with exercise. But, she can have a difficult time with deep breaths.   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   Memory: Sometimes forgets what she was about to do. Episodic. No executive function problems.  Attention: Sometimes while reading.   Processing speed: okay  Not declining the way the balance is.    OTHER:  Pelvic pressure: No  Rectal pressure: No  Hematuria: No, incontinence  Swelling in feet: No    ACTIVITY:  Concussions: 2-12-24, 4-28-25  MVA: None  Neck wires: None  Exercise: Walking, no racket  sports, no weight lifting, stopped swimming.    PAST MEDICAL HISTORY:  Past Medical History:   Diagnosis Date     Bipolar 1 disorder (H)      Family history of Parkinson disease 2024      PAST SURGICAL HISTORY:  Past Surgical History:   Procedure Laterality Date     CATARACT IOL, RT/LT Bilateral      VITRECTOMY ANTERIOR Right 2019    for floaters      SOCIAL HISTORY:  Social History     Socioeconomic History     Marital status:      Spouse name: Not on file     Number of children: Not on file     Years of education: Not on file     Highest education level: Not on file   Occupational History     Not on file   Tobacco Use     Smoking status: Former     Current packs/day: 0.00     Types: Cigarettes     Quit date: 1997     Years since quittin.3     Passive exposure: Never     Smokeless tobacco: Never   Vaping Use     Vaping status: Never Used   Substance and Sexual Activity     Alcohol use: Yes     Alcohol/week: 4.0 - 5.0 standard drinks of alcohol     Types: 4 - 5 Standard drinks or equivalent per week     Drug use: No     Sexual activity: Not Currently   Other Topics Concern     Not on file   Social History Narrative     Not on file     Social Drivers of Health     Financial Resource Strain: Not on file   Food Insecurity: Not on file   Transportation Needs: Not on file   Physical Activity: Not on file   Stress: Not on file   Social Connections: Not on file   Interpersonal Safety: Not on file   Housing Stability: Not on file         ALLERGIES:  Allergies   Allergen Reactions     No Clinical Screening - See Comments Other (See Comments)     Steroids- contraindicated, has glaucoma per pt     Other (Do Not Use)      Other Reaction(s): STEROIDS(SENSITIVITY) - pressure in eyes     Prednisone Other (See Comments)     Has glaucoma, potential for ocular pressure spike.  She has never been on steroids.    Other Reaction(s): Unknown    prednisone     MEDICATIONS:    Current Outpatient Medications:       ARIPiprazole (ABILIFY) 5 MG tablet, Take 1 tablet (5 mg) by mouth daily, Disp: 90 tablet, Rfl: 1     Ascorbic Acid (VITAMIN C PO), Take 500 mg by mouth daily., Disp: , Rfl:      atorvastatin (LIPITOR) 20 MG tablet, Take 1 tablet (20 mg) by mouth daily, Disp: 90 tablet, Rfl: 0     brimonidine (ALPHAGAN) 0.2 % ophthalmic solution, Place 1 drop into both eyes 2 times daily., Disp: 10 mL, Rfl: 11     calcium carbonate 750 MG CHEW, Take 750 mg by mouth 2 times daily., Disp: , Rfl:      Cholecalciferol (VITAMIN D-3 PO), 1 tablet daily - possibly 1000 25mcg daily, Disp: , Rfl:      dorzolamide-timolol (COSOPT) 2-0.5 % ophthalmic solution, Place 1 drop into both eyes 2 times daily., Disp: 10 mL, Rfl: 11     gabapentin (NEURONTIN) 100 MG capsule, 1 tablet once a day as needed, Disp: , Rfl:      lamoTRIgine (LAMICTAL) 150 MG tablet, Take 2 tablets (300 mg) by mouth at bedtime, Disp: 180 tablet, Rfl: 1     levothyroxine (SYNTHROID/LEVOTHROID) 25 MCG tablet, Take 1 tablet (25 mcg) by mouth daily, Disp: 90 tablet, Rfl: 3     Multiple Vitamin (MULTIVITAMIN ADULT PO), Take by mouth. Daily, Disp: , Rfl:      multivitamin (OCUVITE) TABS, Take 1 tablet by mouth daily., Disp: 30 each, Rfl: 0     Omega-3 Fatty Acids (FISH OIL PO), 1 in the morning and 1 at night., Disp: , Rfl:      omeprazole-sodium bicarbonate  MG CAPS per capsule, As needed, Disp: , Rfl:      traZODone (DESYREL) 50 MG tablet, Take 1-2 tablets ( mg) by mouth nightly as needed for sleep, Disp: 180 tablet, Rfl: 1     VITAMIN D PO, Take by mouth. Dose? 1000 units 25mcg, Disp: , Rfl:      PHYSICAL EXAM:  Vitals: BP (!) 150/93 (BP Location: Left arm, Patient Position: Sitting, Cuff Size: Adult Regular)   Pulse 74   SpO2 96%     General: Patient is well-nourished, well-groomed, in no apparent distress. Presents alone    HEENT: 1.5cm laceration on the right eyebrow. Healing well. Has bruising around the right eye. Throat clear, neck supple. Head is positioned  to the left. There is moderately severe kyphosis and forward head position. Shoulders are very rolled forward, can not be reduced.   Ext: Warm, well-perfused. Some non-pitting edema of the left leg. Good pulses.     Neurologic:  Mental Status: Alert and oriented to person, place, time, and situation.  Able to provide an excellent history.     Cranial Nerves: Visual fields full to confrontation. Pupils equal and reactive to light.  Restricted lateral gaze bilaterally. Breakdown of smooth pursuit. No nystagmus.   Face sensation normal.  Normal head impulse testing.  Normal hearing to finger rub. Face symmetric with normal movements. Tongue and palate midline.  Normal shoulder elevation.      Motor: Right arm rigidity with cogwheeling.  No pronator drift.  Normal foot taps.  Full strength to confrontational testing.    Sensory: Normal light touch, temperature, and vibration except for reduced vibratory sense in the left leg where there swelling.     Reflexes: Biceps, Brachioradialis, Triceps, Knees, Ankles 2/4.     Coordination: Normal finger to nose, rapid alternating movements    Gait: Normal stance width.  Negative Romberg.  Good gait initiation.  Good stride length.  Arm swing is down the right.  Normal turn with 2 steps.       Upper Limb Tension Test for Thoracic Outlet Syndrome:  Arms abducted: Numbness and tingling develop in none    Arms abducted head turned to the RIGHT:   Right hand gets worse   Left hand gets   Arms abducted head turned to the LEFT:   Left hand gets    Right hand gets worse    Arms abducted head tilt to the RIGHT:   Right hand gets worse   Left hand gets   Arms abducted head tilt to the LEFT:   Left hand gets worse   Right hand gets     Pain Right scalene: yes, radiation  Pain Left scalene: No  Pain Right sternocleidomastoid: No  Pain Left sternocleidomastoid: No    Pain under the RIGHT pectoralis minor tendon: No  Pain at the RIGHT 1st rib-sternum insertion site: Yes, no radiation  Pain  under the LEFT pectoralis minor tendon: Yes, no radiation  Pain at the LEFT 1st rib-sternum insertion site: yes, no radiation    Bilateral C1 both tender.      DATA:  All available and relevant labs, imaging, and other procedures were reviewed personally.   Last brain imaging:  MRI Brain w & w/o contrast  Narrative: EXAM: MR BRAIN W/O & W CONTRAST  10/3/2024 6:50 PM     HISTORY:  Balance problems       COMPARISON:  Head CT from an outside institution 2/21/2024.    TECHNIQUE: Sagittal T1-weighted, coronal T2-weighted, axial T2 FLAIR,  axial susceptibility weighted, and axial diffusion-weighted with ADC  map images of the brain were obtained without intravenous contrast.  After the administration of intravenous contrast axial and coronal  fat-suppressed T1-weighted weighted images were obtained.    CONTRAST: 7.5mL Gadavist.    FINDINGS:  There is no mass effect, midline shift, or intracranial hemorrhage.  The ventricles are enlarged mildly out of proportion to the cerebral  sulci. No narrowing of the callosal angle or particular crowding of  the sulci at the cranial vertex. No abnormal diffusion restriction.  Punctate focus of susceptibility effect in the anterior left frontal  lobe consistent with chronic microhemorrhage. Minimal leukoaraiosis.  Tiny peripheral old left cerebellar infarct. Major intracranial  vascular structures are within normal limits.    No abnormal postcontrast enhancement.    No suspicious abnormality of the skull marrow signal. Small left  maxillary sinus mucus retention cyst. Mastoid air cells are clear. No  focal abnormality of the pituitary gland, sella, skull base and upper  cervical spinal structures on sagittal images. Bilateral pseudophakia  and bilateral staphyloma, unchanged.  Impression: IMPRESSION:  1. No acute intracranial pathology.  2. Mild ventriculomegaly out of proportion to the cerebral sulci  suggestive of underlying normal pressure hydrocephalus.    AMBER CARROLL MD          SYSTEM ID:  [id number]      The longitudinal plan of care for the condition(s) below were addressed during this visit. Due to the added complexity in care, I will continue to support Eulalia in the subsequent management of this condition(s) and with the ongoing continuity of care of this condition(s).    60-minutes were spent in evaluation, examination, and documentation on the date of service      Again, thank you for allowing me to participate in the care of your patient.      Sincerely,    Cornelio ALEJANDRO Cha, MD

## 2025-04-30 NOTE — PATIENT INSTRUCTIONS
Please call [phone] to schedule the ultrasound at the [facility] ([facility] on [address] [place] [address], [place]. [address]). The Radiology department is on the 1st floor of the [facility]. This is the same building where we met for our appointment. Please note that these tests can only be done at the [facility] because they involve some extra neck positions that are not examined at the other imaging centers.

## 2025-04-30 NOTE — NURSING NOTE
Chief Complaint   Patient presents with    New Patient     New Neurology      BP (!) 150/93 (BP Location: Left arm, Patient Position: Sitting, Cuff Size: Adult Regular)   Pulse 74   SpO2 96%   Lizet Collins

## 2025-05-05 ENCOUNTER — PATIENT OUTREACH (OUTPATIENT)
Dept: CARE COORDINATION | Facility: CLINIC | Age: 70
End: 2025-05-05
Payer: MEDICARE

## 2025-05-27 NOTE — TELEPHONE ENCOUNTER
REASON FOR VISIT: Hydrocephalus  Balance problems [R26.89]  Post-traumatic hydrocephalus (H) [G91.3]  70F with gait dysfunction after traumatic SAH. Has ventriculomegaly    PROVIDER: Hina Rao APRN CNP   DATE OF APPT: 6/13/25 @ 11:00 am    NOTES (FOR ALL VISITS) STATUS DETAILS   OFFICE NOTE from referring provider Internal 4/30/25 [facility]     [facility]ICE NOTE from other specialist Care Everywhere 3/11/25, 11/26/24 [facility]    12/2/24, 7/2/24 LionNiurka nolasco  NP @[facility]    10/7/24, 9/23/24 [facility]    2/21/24 Deni Irving PA-C  @[facility]    2/19/24 Nayana Quevedo PA-C  @[facility]    2/19/24 Syed Balbuena MD  @[facility]     HOSPITAL ENCOUNTERS Care Everywhere 2/12/24 Omero Estrada [place]     MEDICATION LIST Internal    IMAGING  (FOR ALL VISITS)     MRI (HEAD, NECK, SPINE) Internal [facility]  10/3/24 MR Brain     CT (HEAD, NECK, SPINE) [place] [facility]  2/21/24 CT Head  2/12/24 CT Head  2/12/24 CT Head  2/12/24 CT Cervical Spine

## 2025-05-28 ENCOUNTER — ANCILLARY PROCEDURE (OUTPATIENT)
Dept: ULTRASOUND IMAGING | Facility: CLINIC | Age: 70
End: 2025-05-28
Attending: PSYCHIATRY & NEUROLOGY
Payer: MEDICARE

## 2025-05-28 DIAGNOSIS — I87.1 VEIN COMPRESSION: ICD-10-CM

## 2025-05-28 DIAGNOSIS — G54.0 TOS (THORACIC OUTLET SYNDROME): ICD-10-CM

## 2025-05-29 DIAGNOSIS — M24.20 EAGLE'S SYNDROME: ICD-10-CM

## 2025-05-29 DIAGNOSIS — I87.1 COMPRESSION OF VEIN: Primary | ICD-10-CM

## 2025-05-29 DIAGNOSIS — M54.2 NECK PAIN: ICD-10-CM

## 2025-05-29 DIAGNOSIS — R26.89 IMBALANCE: ICD-10-CM

## 2025-06-03 ENCOUNTER — VIRTUAL VISIT (OUTPATIENT)
Dept: DERMATOLOGY | Facility: CLINIC | Age: 70
End: 2025-06-03
Payer: MEDICARE

## 2025-06-03 DIAGNOSIS — C44.41 BASAL CELL CARCINOMA (BCC) OF LEFT SIDE OF NECK: Primary | ICD-10-CM

## 2025-06-03 ASSESSMENT — PAIN SCALES - GENERAL: PAINLEVEL_OUTOF10: NO PAIN (0)

## 2025-06-03 NOTE — LETTER
6/3/2025       RE: Emperatriz Esteban  [address]  [facility] [address]     Dear Colleague,    Thank you for referring your patient, Emperatriz Esteban, to the [facility] DERMATOLOGIC SURGERY CLINIC [place] at [facility]. Please see a copy of my visit note below.    Virtual Visit Details    Type of service: Telephone Visit   Phone call duration: 3 minutes   Originating Location (pt. Location): Home  Distant Location (provider location):  On-site  Telephone visit completed due to the patient did not have access to video, while the distant provider did.     Dermatologic Surgery Consultation Telemedicine Note   Encounter Date: Dread 3, 2025  Store-and-Forward and Telephone ([phone]). Location of teledermatologist: [facility]ERMATOLOG[facility] [place].  Start time: 3:24 pm  End time: 3:27 pm    Dermatology Problem List:  1. snBCC, left neck, s/p shave biopsy 4/1/2025, scheduled for MMS on 6/11/25  2. Actinic keratoses  - cryotherapy 4/1/25     Family history of melanoma, brother.  ___________________________________________________________________    CC: mohs consult (snBCC left neck )      Subjective: Emperatriz Esteban is a 70 year old female who presents today for Mohs micrographic surgery consultation for a recent diagnosis of skin cancer.  - Skin cancer(s): BCC  - Location(s): left neck  - reports that its her first mohs surgery  - pt asking if she can come alone  - no other concerns today         Objective:   Focused examination of the left neck within the teledermatology photograph(s) on 4/1/25 was performed.   - erythematous macule on the left neck       Path report:   Collection date: 4/1/25  Case: [id number]  Final Diagnosis   A(1). Skin, left neck, shave:  - Basal cell carcinoma, superficial and nodular types       Assessment and Plan:     1. Plan for Mohs micrographic surgery for skin cancer(s) above:  *Review lab result(s):  Dermpath report   - We discussed the nature of the diagnosis/condition above. We discussed the treatment options, including the risks benefits and expectations of these options. We recommend micrographic surgery as the most effective and most tissue sparing option for treatment, and the patient agrees to proceed with this.  The patient is aware of the risks, benefits and expectations of this procedure. The patient will be scheduled for this procedure, if not already done so.  - We anticipate the following closure type: Linear closure, such as complex linear closure (CLC)    Patient was discussed with and evaluated by attending physician James Steven MD.      Staff Involved:   Scribe/Fellow/Staff     Attending Attestation  I attest that the Scribe recorded the interview and exam that I personally performed.  I have reviewed the note and edited it as necessary.    James Steven M.D.  Professor  Director of Dermatologic Surgery  Department of Dermatology  [facility]          Again, thank you for allowing me to participate in the care of your patient.      Sincerely,    James Steven MD

## 2025-06-03 NOTE — PROGRESS NOTES
Virtual Visit Details    Type of service: Telephone Visit   Phone call duration: 3 minutes   Originating Location (pt. Location): Home  Distant Location (provider location):  On-site  Telephone visit completed due to the patient did not have access to video, while the distant provider did.     Dermatologic Surgery Consultation Telemedicine Note   Encounter Date: Dread 3, 2025  Store-and-Forward and Telephone ([phone]). Location of teledermatologist: [facility] DERMATOLOGIC SUR[facility] CLIN[facility] [place].  Start time: 3:24 pm  End time: 3:27 pm    Dermatology Problem List:  1. snBCC, left neck, s/p shave biopsy 4/1/2025, scheduled for MMS on 6/11/25  2. Actinic keratoses  - cryotherapy 4/1/25     Family history of melanoma, brother.  ___________________________________________________________________    CC: mohs consult (snBCC left neck )      Subjective: Emperatriz Esteban is a 70 year old female who presents today for Mohs micrographic surgery consultation for a recent diagnosis of skin cancer.  - Skin cancer(s): BCC  - Location(s): left neck  - reports that its her first mohs surgery  - pt asking if she can come alone  - no other concerns today         Objective:   Focused examination of the left neck within the teledermatology photograph(s) on 4/1/25 was performed.   - erythematous macule on the left neck       Path report:   Collection date: 4/1/25  Case: [id number]  Final Diagnosis   A(1). Skin, left neck, shave:  - Basal cell carcinoma, superficial and nodular types       Assessment and Plan:     1. Plan for Mohs micrographic surgery for skin cancer(s) above:  *Review lab result(s): Dermpath report   - We discussed the nature of the diagnosis/condition above. We discussed the treatment options, including the risks benefits and expectations of these options. We recommend micrographic surgery as the most effective and most tissue sparing option for treatment, and the patient agrees to proceed with this.  The  patient is aware of the risks, benefits and expectations of this procedure. The patient will be scheduled for this procedure, if not already done so.  - We anticipate the following closure type: Linear closure, such as complex linear closure (CLC)    Patient was discussed with and evaluated by attending physician James Steven MD.      Staff Involved:   Scribe/Fellow/Staff     Attending Attestation  I attest that the Scribe recorded the interview and exam that I personally performed.  I have reviewed the note and edited it as necessary.    James Steven M.D.  Professor  Director of Dermatologic Surgery  Department of Dermatology  [facility]

## 2025-06-04 ENCOUNTER — PRE VISIT (OUTPATIENT)
Dept: NEUROLOGY | Facility: CLINIC | Age: 70
End: 2025-06-04

## 2025-06-11 ENCOUNTER — TELEPHONE (OUTPATIENT)
Dept: DERMATOLOGY | Facility: CLINIC | Age: 70
End: 2025-06-11

## 2025-06-11 ENCOUNTER — PRE VISIT (OUTPATIENT)
Dept: DERMATOLOGY | Facility: CLINIC | Age: 70
End: 2025-06-11

## 2025-06-11 ENCOUNTER — OFFICE VISIT (OUTPATIENT)
Dept: DERMATOLOGY | Facility: CLINIC | Age: 70
End: 2025-06-11
Payer: MEDICARE

## 2025-06-11 VITALS — DIASTOLIC BLOOD PRESSURE: 85 MMHG | SYSTOLIC BLOOD PRESSURE: 132 MMHG | HEART RATE: 71 BPM

## 2025-06-11 DIAGNOSIS — C44.41 BASAL CELL CARCINOMA (BCC) OF LEFT SIDE OF NECK: ICD-10-CM

## 2025-06-11 PROCEDURE — 3079F DIAST BP 80-89 MM HG: CPT | Performed by: DERMATOLOGY

## 2025-06-11 PROCEDURE — 3075F SYST BP GE 130 - 139MM HG: CPT | Performed by: DERMATOLOGY

## 2025-06-11 PROCEDURE — 17311 MOHS 1 STAGE H/N/HF/G: CPT | Performed by: DERMATOLOGY

## 2025-06-11 PROCEDURE — 13132 CMPLX RPR F/C/C/M/N/AX/G/H/F: CPT | Performed by: DERMATOLOGY

## 2025-06-11 NOTE — LETTER
6/11/2025       RE: Emperatriz Esteban  [address]  [facility] [address]     Dear Colleague,    Thank you for referring your patient, Emperatriz Esteban, to the [facility] DERMATOLOGIC SURGERY CLINIC [place] at [facility]. Please see a copy of my visit note below.    [facility] Dermatologic Surgery Clinic [place] Procedure Note      Date of Service:  Jun 11, 2025  Surgery: Mohs micrographic surgery    Case 1  Repair Type: complex  Repair Size: 4.0 cm  Suture Material: Fast Absorbing Gut 5-0  Tumor Type: BCC - Basal cell carcinoma  Location: left neck  Derm-Path Accession #: [id number]  PreOp Size: 1.0 x 1.0 cm  PostOp Size: 2.5 x 2.0 cm  Mohs Accession #:   Level of Defect: fat      Procedure:  We discussed the principles of treatment and most likely complications including scarring, bleeding, infection, swelling, pain, crusting, nerve damage, large wound,  incomplete excision, wound dehiscence,  nerve damage, recurrence, and a second procedure may be recommended to obtain the best cosmetic or functional result.    Informed consent was obtained and the patient underwent the procedure as follows:  The patient was placed supine on the operating table.  The cancer was identified, outlined with a marker, and verified by the patient.  The entire surgical field was prepped with Hibiclens.  The surgical site was anesthetized using Lidocaine 1% with epi 1:100,000.      The area of clinically apparent tumor was debulked. The layer of tissue was then surgically excised using a #15 blade and was then transferred onto a specimen sheet maintaining the orientation of the specimen. Hemostasis was obtained using heat cautery. The wound site was then covered with a dressing while the tissue samples were processed for examination.    The excised tissue was transported to the Mohs histology laboratory maintaining the tissue orientation.  The tissue  specimen was relaxed so that the entire surgical margin was in a a single horizontal plane for sectioning and inked for precise mapping.  A precise reference map was drawn to reflect the sectioning of the specimen, colored inking of the margins, and orientation on the patient. The tissue was processed using horizontal sectioning of the base and continuous peripheral margins.  The histopathologic sections were reviewed in conjunction with the reference map.    Total blocks: 1    Total slides:  2    There were no cancer cells visualized on examination, therefore Mohs surgery was complete.    Reconstruction: Complex Closure    The patient was taken to the operative suite and placed supine on the operating room table. The defect was identified. Appropriate markings were made with a marking pen to plan the repair. The area was infiltrated with Lidocaine 1% with epi 1:100,000 and prepped with Hibiclens and draped with sterile towels.     The wound was debeveled and undermined widely. Cones were excised within relaxed skin tension lines on both sides of the defect. Hemostasis was obtained using heat cautery. The wound was narrowed with SMAS placation and the dermis and subcutaneous (nonmuscle) fascia tissues were then approximated using monocryl 4-0  sutures (deep layer suturing). The wound edges were then approximated additional buried sutures were placed in a similar fashion where needed.  Percutaneous simple running Fast Absorbing Gut 5-0 sutures (superficial layer suturing) were carefully placed for maximum eversion and meticulous approximation.    Repair Size: 4.0 cm  Sutures Used:  Deep: monocryl 4-0 Superficial: Fast Absorbing Gut 5-0     The wound was cleansed with saline and ointment was applied along the wound surface.     A sterile pressure dressing was applied.  Wound care instructions were given verbally and in writing.  The patient left the operating suite in stable condition.  Patient was informed that  additional refinement of the resulting surgical scar may be used as a second stage of this reconstruction.     Dr. James Steven MD was physically present  for the entire procedure, key portions of the reconstruction and always immediately available.     Scribe Disclosure:   I, Dara Phillip, am serving as a scribe; to document services personally performed by James Steven MD -based on data collection and the provider's statements to me.     Attending attestation:  I personally performed the entire procedure.  I have reviewed the note and edited it as necessary, and agree with its contents.    James Steven M.D.  Professor  Director of Dermatologic Surgery  Department of Dermatology  [facility]    Dermatology Surgery Clinic  [facility] [place] [address]        Again, thank you for allowing me to participate in the care of your patient.      Sincerely,    James Steven MD

## 2025-06-11 NOTE — TELEPHONE ENCOUNTER
Follow up call completed following Mohs procedure with Dr. Steven.       Are you having pain? NO  Are you taking pain medication? none  Are you applying ice?  NO  Have you had any noticeable bleeding through the bandage? NO   Do you have any other concerns? NO       Please call [phone] if you have any questions or concerns during business hours. For concerns after hours, call the hospital  to reach the dermatology resident on call at [phone], option 4.       Rachel PRICE RN  Dermatology Surgery

## 2025-06-11 NOTE — PROGRESS NOTES
[facility] Dermatologic Surgery Clinic [place] Procedure Note      Date of Service:  Jun 11, 2025  Surgery: Mohs micrographic surgery    Case 1  Repair Type: complex  Repair Size: 4.0 cm  Suture Material: Fast Absorbing Gut 5-0  Tumor Type: BCC - Basal cell carcinoma  Location: left neck  Derm-Path Accession #: [id number]  PreOp Size: 1.0 x 1.0 cm  PostOp Size: 2.5 x 2.0 cm  Mohs Accession #:   Level of Defect: fat      Procedure:  We discussed the principles of treatment and most likely complications including scarring, bleeding, infection, swelling, pain, crusting, nerve damage, large wound,  incomplete excision, wound dehiscence,  nerve damage, recurrence, and a second procedure may be recommended to obtain the best cosmetic or functional result.    Informed consent was obtained and the patient underwent the procedure as follows:  The patient was placed supine on the operating table.  The cancer was identified, outlined with a marker, and verified by the patient.  The entire surgical field was prepped with Hibiclens.  The surgical site was anesthetized using Lidocaine 1% with epi 1:100,000.      The area of clinically apparent tumor was debulked. The layer of tissue was then surgically excised using a #15 blade and was then transferred onto a specimen sheet maintaining the orientation of the specimen. Hemostasis was obtained using heat cautery. The wound site was then covered with a dressing while the tissue samples were processed for examination.    The excised tissue was transported to the Mohs histology laboratory maintaining the tissue orientation.  The tissue specimen was relaxed so that the entire surgical margin was in a a single horizontal plane for sectioning and inked for precise mapping.  A precise reference map was drawn to reflect the sectioning of the specimen, colored inking of the margins, and orientation on the patient. The tissue was processed using horizontal sectioning of the  base and continuous peripheral margins.  The histopathologic sections were reviewed in conjunction with the reference map.    Total blocks: 1    Total slides:  2    There were no cancer cells visualized on examination, therefore Mohs surgery was complete.    Reconstruction: Complex Closure    The patient was taken to the operative suite and placed supine on the operating room table. The defect was identified. Appropriate markings were made with a marking pen to plan the repair. The area was infiltrated with Lidocaine 1% with epi 1:100,000 and prepped with Hibiclens and draped with sterile towels.     The wound was debeveled and undermined widely. Cones were excised within relaxed skin tension lines on both sides of the defect. Hemostasis was obtained using heat cautery. The wound was narrowed with SMAS placation and the dermis and subcutaneous (nonmuscle) fascia tissues were then approximated using monocryl 4-0  sutures (deep layer suturing). The wound edges were then approximated additional buried sutures were placed in a similar fashion where needed.  Percutaneous simple running Fast Absorbing Gut 5-0 sutures (superficial layer suturing) were carefully placed for maximum eversion and meticulous approximation.    Repair Size: 4.0 cm  Sutures Used:  Deep: monocryl 4-0 Superficial: Fast Absorbing Gut 5-0     The wound was cleansed with saline and ointment was applied along the wound surface.     A sterile pressure dressing was applied.  Wound care instructions were given verbally and in writing.  The patient left the operating suite in stable condition.  Patient was informed that additional refinement of the resulting surgical scar may be used as a second stage of this reconstruction.     Dr. James Steven MD was physically present  for the entire procedure, key portions of the reconstruction and always immediately available.     Scribe Disclosure:   I, Dara Phillip, am serving as a scribe; to document  services personally performed by James Steven MD -based on data collection and the provider's statements to me.     Attending attestation:  I personally performed the entire procedure.  I have reviewed the note and edited it as necessary, and agree with its contents.    James Steven M.D.  Professor  Director of Dermatologic Surgery  Department of Dermatology  [facility]    Dermatology Surgery Clinic  [facility] [place] [id number]

## 2025-06-11 NOTE — PATIENT INSTRUCTIONS
Wound care    I will experience scar, bleeding, swelling, pain, crusting and redness. I may experience incomplete removal or recurrence. Risks are bleeding, bruising, swelling, infection, nerve damage, & a large wound. A second procedure may be recommended to obtain the best cosmetic or functional result.       A three month office visit with your Surgeon is recommended for scar evaluation. Please reach out sooner if you have concerns about you surgical site/wound.    Caring for your skin after surgery    After your surgery, a pressure bandage will be placed over the area that has stitches. This is important to prevent bleeding. Please follow these instructions over the next 1 to 2 weeks. Following this regimen will help to prevent complications as your wound heals.     For the first 48 hours after your surgery:    Leave the pressure dressing on and keep it dry. If it should come loose, you may re-tape it, but do not take it off.  Relax and take it easy. Do not do any vigorous exercise or heavy lifting. This could cause the wound to bleed.  Post-Operative pain is usually mild. If you are able to take tylenol, You may take plain or extra-strength Tylenol (acetaminophen) As directed on the bottle (do not take more than 4,000mg in one day). If you are able to take ibuprofen, you can alternate the tylenol and ibuprofen.   Avoid alcohol as this may increase your tendency to bleed.   You may put an ice pack around the bandaged area for 20 minutes at a time as needed. This may help reduce swelling, bruising, and pain. Make sure the ice pack is waterproof so that the pressure bandage doesn t get wet.  If the wound is on the face try to sleep with your head elevated. Either in a recliner or propped up in bed, this will decrease swelling around the eyes.   You may see a small amount of drainage or blood on your pressure bandage. This is normal. However:  If drainage or bleeding continues or saturates the bandage, you will  need to apply firm pressure over the bandage with a piece of gauze for 15 minutes.  If bleeding continues after applying pressure for 15 minutes, apply an ice pack to the bandaged area for 15 minutes.  If bleeding still continues, call our office or go to the nearest emergency room.    Remove pressure dressing 48 hours after surgery:    Carefully remove the pressure bandage. If it seems sticky or too difficult to get off, you may need to soak it off in the shower.  After the pressure dressing is removed, you may shower and get the wound wet. However, Do Not let the forceful stream of the shower hit the wound directly.  Follow these wound care and dressing change instructions:    You have skin glue over the stitches. This will dry and flake off with time (about 2 weeks). If the stitches are still hanging around after 2 weeks, let us know, we can clip them out for you if they do not fall out with washing.   You may allow water to run over the site. Do not soak.  Do Not rub or scrub the site.  Pat dry after the shower or bath.  Avoid topical medications, lotions, creams, ointment,or oils.  Do not use tanning lamps or expose the site to sun.   Check wound appearance daily, some swelling and redness is normal after a procedure but should go away as your would is healing. If the swelling and redness or pain increases or if any other signs of infection occur listed below please send in a photo via my chart and or call us to let us know.  The clear glue film should start peeling and flaking off approximately around 2 weeks. By this time your wound should be sufficiently healed. If it still looks to be healing when the glue comes off you can clean the wound with soapy warm water daily in the shower. No need to bandage further, but you can put a bandage on the area daily for the two weeks if you would like.   If the glue comes off early before 2 weeks, apply vaseline and a bandage daily until the 2 weeks post-surgery date.  "Make sure to continue to clean the area daily before applying the vaseline and bandage.   If skin glue and sutures are still intact at 2 weeks after your procedure, you can start applying Vaseline daily to help soften up the skin glue. It will come off easier this way.        If you are able to take acetaminophen (\"Tylenol,\" etc.) and ibuprofen (\"Advil\" or \"Motrin,\" etc.), then you may STAGGER these medications by taking 400 mg of ibuprofen (usually two tabs) every 8 hours and 1,000 mg of acetaminophen (e.g., two tabs of extra-strength Tylenol) every 8 hours.    This means, for example, that you could take the followin,000 mg of Tylenol, followed 4 hours later by 400 mg Ibuprofen, followed 4 hours later with 1,000 mg of Tylenol, followed 4 hours later by 400 mg Ibuprofen, followed 4 hours later with 1,000 mg of Tylenol, and so forth.     Essentially, you can take either 1,000 mg of Tylenol or 400 mg of ibuprofen in alternating fashion EVERY FOUR HOURS.    Do NOT exceed more than 4,000 mg of Tylenol or 3,200 mg of ibuprofen per 24 hours. If you are not able to take Tylenol or ibuprofen as above due to other health issues (or a physician has told you directly that you are not allowed to take one of them, say due to pre-existing severe liver or kidney issues), then disregard the above directions.    Scientific evidence supports that this combination/schedule of pain medications is just as effective, if not more effective, than taking a narcotic pain medicine.       Follow up will be a 3 month scar evaluation either in person or via a telephone visit with you sending in a photo via Team[employer]. Unless you have been told to follow up sooner or if you have concerns and would like to be see sooner. Please call or send us in a Teamly message if possible and attach a photo.        What to expect:    The first couple of days your wound may be tender and may bleed slightly when doing wound care.  There may be swelling and " bruising around the wound, especially if it is near the eyes. For your comfort, you may apply ice or cold compresses to the bruises after your have removed the pressure bandage.  The area around your wound may be numb for several weeks or even months.  You may experience periodic sharp pain or mild itching around the wound as it heals.   The suture line will look dark for a while but will lighten over time.       When to call us:    You have bleeding that will not stop after applying pressure and ice.  You have pain that is not controlled with Tylenol (acetaminophen.)  You have signs or symptoms of an infection such as:  Fever over 100 degrees Fahrenheit  Redness, warmth or foul-smelling drainage from the wound  If you have any questions, or are not sure how to take care of the wound.    Phone numbers:    During business hours (M-F 8:00-4:30 p.m.)  [facility]-  [phone] Option 1 appt. Desk and ask for the Dermatology Surgery Team  [phone] Dermatology .     ---------------------------------------------------------  Evenings/Weekends/Holidays  Hospital - [phone]   TTY for hearing kqvtbrlr-[phone]  *Ask  to page dermatologist on-call  Emergency Rzhv-[phone]  TTY for hearing impaired- [phone]

## 2025-06-11 NOTE — NURSING NOTE
Chief Complaint   Patient presents with    Derm Problem     Mohs and reconstruction to BCC L neck     Rachel PRICE, RN  Dermatology Surgery

## 2025-06-12 ENCOUNTER — ANCILLARY PROCEDURE (OUTPATIENT)
Dept: CT IMAGING | Facility: CLINIC | Age: 70
End: 2025-06-12
Attending: PSYCHIATRY & NEUROLOGY
Payer: MEDICARE

## 2025-06-12 LAB
CREAT BLD-MCNC: 0.8 MG/DL (ref 0.5–1)
EGFRCR SERPLBLD CKD-EPI 2021: >60 ML/MIN/1.73M2

## 2025-06-12 PROCEDURE — 82565 ASSAY OF CREATININE: CPT | Performed by: PATHOLOGY

## 2025-06-12 PROCEDURE — 70498 CT ANGIOGRAPHY NECK: CPT | Performed by: RADIOLOGY

## 2025-06-12 PROCEDURE — 70496 CT ANGIOGRAPHY HEAD: CPT | Performed by: RADIOLOGY

## 2025-06-12 RX ORDER — IOPAMIDOL 755 MG/ML
70 INJECTION, SOLUTION INTRAVASCULAR ONCE
Status: COMPLETED | OUTPATIENT
Start: 2025-06-12 | End: 2025-06-12

## 2025-06-12 RX ADMIN — IOPAMIDOL 70 ML: 755 INJECTION, SOLUTION INTRAVASCULAR at 16:50

## 2025-06-12 NOTE — DISCHARGE INSTRUCTIONS

## 2025-06-13 ENCOUNTER — PRE VISIT (OUTPATIENT)
Dept: NEUROSURGERY | Facility: CLINIC | Age: 70
End: 2025-06-13

## 2025-06-18 ENCOUNTER — THERAPY VISIT (OUTPATIENT)
Dept: PHYSICAL THERAPY | Facility: CLINIC | Age: 70
End: 2025-06-18
Attending: PSYCHIATRY & NEUROLOGY
Payer: MEDICARE

## 2025-06-18 DIAGNOSIS — R26.89 BALANCE PROBLEMS: ICD-10-CM

## 2025-06-18 NOTE — PROGRESS NOTES
PHYSICAL THERAPY EVALUATION  Type of Visit: Evaluation        Fall Risk Screen:  Have you fallen 2 or more times in the past year?: Yes  Have you fallen and had an injury in the past year?: No      Subjective         Presenting condition or subjective complaint: Balance  Chart records - 2 unexplained episodes of inability to control gait in 2019 but whose balance became significantly worse after a traumatic left frontal SAH and left orbital roof fracture from a mechanical fall (2-12-24). Given a description of a rocking type vertigo, she was referred to me.  Balance, gait, and confidence on a flat carpeted surface is fine.  However, she has significant anxiety walking outside her home, on uneven ground, concrete, and with stairs.  Poor vision - myopic.  NPH    In 2019 it started had two bouts of weeving getting out of bed. Since then has felt it.  Vision was better then.     Will be doing a 4 day hospital bout to test NPH.      Walks around the apartment . Goes out 1-2 times a day with someone who helps walk the dog. She will use the cane and occasionally hold on to the person if she needs.    Will go down to community room and sometimes its hard moving around tables and people.     Had a fall once getting up in her room. But otherwise no falls.     Not sure if she would walk her dog on her own if she felt better, but would like to go outside more. Curbs are hard but this is because of vision.        Date of onset: 02/01/24    Relevant medical history:     Dates & types of surgery:      Prior diagnostic imaging/testing results: MRI; CT scan     Prior therapy history for the same diagnosis, illness or injury: Yes February 2025    Prior Level of Function  Transfers: Independent  Ambulation: Independent    Living Environment  Social support: Alone   Type of home: Apartment/condo   Stairs to enter the home: No       Ramp: No   Stairs inside the home: No       Help at home: None  Equipment owned: Straight Cane      Employment: No    Hobbies/Interests:      Patient goals for therapy: Walk with confidence outdoors    Pain assessment: Pain denied     Objective      Cognitive Status Examination  Orientation: Oriented to person, place and time   Level of Consciousness: Alert  Follows Commands and Answers Questions: 100% of the time  Personal Safety and Judgement: Intact  Memory: Intact    OBSERVATION: Arrives ind, looking down as ambulating into clinic  INTEGUMENTARY: Intact  POSTURE: Standing Posture: Rounded shoulders, Forward head  PALPATION: NT    TRANSFERS: Independent        GAIT:   Level of [place]: WNL  Assistive Device(s): None  Gait Deviations: slow paced, looks down to ground   Gait Distance: DGI  Stairs: with use of rail     BALANCE:  overall shows good balance but reports feeling off balance and has a fear of falling with certain tasks    SPECIAL TESTS  Functional Gait Assessment (FGA)      10 Meter Walk Test (Comfortable)  9 - 6.66 ft/s     Dynamic Gait Index (DGI) Total Score (out of 24): 20   Modified CTSIB Conditions (sec) Cond 1: 30  Cond 2: 30  Cond 4: able to stand, but held on to PT, then PT held on to patient.  Sig fear  Cond 5 : NT           Assessment & Plan   CLINICAL IMPRESSIONS  Medical Diagnosis: Sensory Selection and weighting deficit    Treatment Diagnosis:     Impression/Assessment: Patient is a 70 year old female with imbalance and a significant fear of falling when ambulating outdoor complaints.  She is able to ambulate outdoors but only if she is with another person, and needs assistance on a curb.  Fear of falling playing a role in overall presentation but also shows decreased use of vestibular cues in balance. The following significant findings have been identified: Impaired balance, Decreased activity tolerance, and Disequilibrium . These impairments interfere with their ability to perform recreational activities, household mobility, and community mobility as compared to previous level  of function.     Clinical Decision Making (Complexity):  Clinical Presentation: Evolving/Changing  Clinical Presentation Rationale: based on medical and personal factors listed in PT evaluation  Clinical Decision Making (Complexity): Moderate complexity    PLAN OF CARE  Treatment Interventions:  Interventions: Neuromuscular Re-education, Therapeutic Activity, Therapeutic Exercise, Self-Care/Home Management    Long Term Goals     PT Goal 1  Goal Identifier: HEP  Goal Description: Patient will be independent with HEP to allow for continued improvements in health and wellness upon DC  Rationale: to maximize safety and independence within the community  Target Date: 09/15/25  PT Goal 2  Goal Identifier: mCSTIB  Goal Description: Patient to show improved score to 20 seconds on condition 3  Rationale: to maximize safety and independence within the community  Target Date: 09/15/25  PT Goal 3  Goal Identifier: DGI  Goal Description: Patient to show improved score to 23 out of 24 as well as reporting no symptoms on all conditions  Rationale: to maximize safety and independence within the community  Target Date: 09/15/25      Frequency of Treatment: 1 time per week  Duration of Treatment: 90 days    Recommended Referrals to Other Professionals:   Education Assessment:   Learner/Method: Patient  Education Comments: POC and HEP    Risks and benefits of evaluation/treatment have been explained.   Patient/Family/caregiver agrees with Plan of Care.     Evaluation Time:     PT Eval, Moderate Complexity Minutes (34806): 25       Signing Clinician: Bharti Reis, PT        [facility]                                                                                   OUTPATIENT PHYSICAL THERAPY      PLAN OF TREATMENT FOR OUTPATIENT REHABILITATION   Patient's Last Name, First Name, Emperatriz Gupta YOB: 1955   Provider's Name   [facility]   Medical  Record No.  [id number]     Onset Date: 02/01/24  Start of Care Date: 06/18/25     Medical Diagnosis:  Sensory Selection and weighting deficit      PT Treatment Diagnosis:    Plan of Treatment  Frequency/Duration: 1 time per week/ 90 days    Certification date from 06/18/25 to 09/15/25         See note for plan of treatment details and functional goals     Bharti Reis, PT                         I CERTIFY THE NEED FOR THESE SERVICES FURNISHED UNDER        THIS PLAN OF TREATMENT AND WHILE UNDER MY CARE     (Physician attestation of this document indicates review and certification of the therapy plan).              Referring Provider:  Cornelio RIVERA Cha    Initial Assessment  See Epic Evaluation- Start of Care Date: 06/18/25

## 2025-06-30 ENCOUNTER — MYC MEDICAL ADVICE (OUTPATIENT)
Dept: NEUROSURGERY | Facility: CLINIC | Age: 70
End: 2025-06-30
Payer: MEDICARE

## 2025-07-02 NOTE — TELEPHONE ENCOUNTER
Received a [employer] message from pt re lumbar drain trial and called her back to discuss admission dates. I offered 7/14/25 but pt said that will not work for her. We next discussed 8/25 admission, which she said will work. Once the admission order is entered, I will send information about the lumbar drain trial via [employer].    Pt asked questions about clothes she should bring to he hospital.  I let her know that she can bring items such as a robe, slippers, pajama bottoms if she chooses, but this isn't necessary. Pt will likely wear a gown during the admission b/c staff will need to check the drain site each shift. Pt may want to bring reading material or electronics, and charging cords and blocks.     Pt expressed that she is nervous about the admission and potential surgery as she has never been in the hospital before. I offered her an appointment with Dr. Jean prior to the admission and she declined.     Pt expressed understanding of the above. She is welcome to call me with any questions. Nothing further at this time.

## 2025-07-03 DIAGNOSIS — G91.2 NPH (NORMAL PRESSURE HYDROCEPHALUS) (H): Primary | ICD-10-CM

## 2025-07-06 DIAGNOSIS — I65.22 STENOSIS OF LEFT CAROTID ARTERY: Primary | ICD-10-CM

## 2025-07-19 ENCOUNTER — HEALTH MAINTENANCE LETTER (OUTPATIENT)
Age: 70
End: 2025-07-19

## 2025-07-21 ENCOUNTER — THERAPY VISIT (OUTPATIENT)
Dept: PHYSICAL THERAPY | Facility: CLINIC | Age: 70
End: 2025-07-21
Payer: MEDICARE

## 2025-07-21 DIAGNOSIS — F40.298 FEAR OF FALLING: ICD-10-CM

## 2025-07-21 DIAGNOSIS — R26.89 BALANCE PROBLEMS: Primary | ICD-10-CM

## 2025-07-25 ENCOUNTER — ANCILLARY PROCEDURE (OUTPATIENT)
Dept: ULTRASOUND IMAGING | Facility: CLINIC | Age: 70
End: 2025-07-25
Attending: PSYCHIATRY & NEUROLOGY
Payer: MEDICARE

## 2025-07-25 DIAGNOSIS — I65.22 STENOSIS OF LEFT CAROTID ARTERY: ICD-10-CM

## 2025-07-25 PROCEDURE — 93880 EXTRACRANIAL BILAT STUDY: CPT | Performed by: RADIOLOGY

## 2025-07-26 DIAGNOSIS — I65.22 STENOSIS OF LEFT CAROTID ARTERY: Primary | ICD-10-CM

## 2025-07-28 ENCOUNTER — THERAPY VISIT (OUTPATIENT)
Dept: PHYSICAL THERAPY | Facility: CLINIC | Age: 70
End: 2025-07-28
Payer: MEDICARE

## 2025-07-28 DIAGNOSIS — G89.29 CHRONIC BILATERAL LOW BACK PAIN WITHOUT SCIATICA: ICD-10-CM

## 2025-07-28 DIAGNOSIS — F40.298 FEAR OF FALLING: ICD-10-CM

## 2025-07-28 DIAGNOSIS — M54.50 CHRONIC BILATERAL LOW BACK PAIN WITHOUT SCIATICA: ICD-10-CM

## 2025-07-28 DIAGNOSIS — R26.89 BALANCE PROBLEMS: Primary | ICD-10-CM

## 2025-07-29 ENCOUNTER — ANCILLARY PROCEDURE (OUTPATIENT)
Dept: CT IMAGING | Facility: CLINIC | Age: 70
End: 2025-07-29
Attending: PSYCHIATRY & NEUROLOGY
Payer: MEDICARE

## 2025-07-29 DIAGNOSIS — I65.22 STENOSIS OF LEFT CAROTID ARTERY: ICD-10-CM

## 2025-07-29 LAB
CREAT BLD-MCNC: 0.9 MG/DL (ref 0.5–1)
EGFRCR SERPLBLD CKD-EPI 2021: >60 ML/MIN/1.73M2

## 2025-07-29 PROCEDURE — 70498 CT ANGIOGRAPHY NECK: CPT | Performed by: RADIOLOGY

## 2025-07-29 PROCEDURE — 82565 ASSAY OF CREATININE: CPT | Performed by: PATHOLOGY

## 2025-07-29 RX ORDER — IOPAMIDOL 755 MG/ML
70 INJECTION, SOLUTION INTRAVASCULAR ONCE
Status: COMPLETED | OUTPATIENT
Start: 2025-07-29 | End: 2025-07-29

## 2025-07-29 RX ADMIN — IOPAMIDOL 70 ML: 755 INJECTION, SOLUTION INTRAVASCULAR at 17:15

## 2025-07-29 NOTE — DISCHARGE INSTRUCTIONS

## 2025-07-30 ENCOUNTER — TELEPHONE (OUTPATIENT)
Dept: VASCULAR SURGERY | Facility: CLINIC | Age: 70
End: 2025-07-30
Payer: MEDICARE

## 2025-07-30 DIAGNOSIS — I65.22 STENOSIS OF LEFT CAROTID ARTERY: Primary | ICD-10-CM

## 2025-07-30 DIAGNOSIS — E09.59: ICD-10-CM

## 2025-07-30 NOTE — TELEPHONE ENCOUNTER
Vascular Referral Intake    Appointment note (to be pasted into appt note. Also add where additional info is located ie: outside images pushed to PACS, in Epic, sent to HIM, etc):   Referred by Dr Candelario for carotid stenosis    Specialty: Vascular Surgery    Specific Provider if Necessary:  Dr. Yoandy Minaya    Visit Type: New    Time Frame: Next Available    Testing/Imaging Needed Before Consult: None  US and CTA in Pacs.    Michael or bed needed: No    *Schedulers: Please send welcome letter to patient after appointment(s) scheduled*

## 2025-08-02 ENCOUNTER — OFFICE VISIT (OUTPATIENT)
Dept: URGENT CARE | Facility: URGENT CARE | Age: 70
End: 2025-08-02
Payer: MEDICARE

## 2025-08-02 VITALS
DIASTOLIC BLOOD PRESSURE: 90 MMHG | TEMPERATURE: 98 F | OXYGEN SATURATION: 98 % | RESPIRATION RATE: 16 BRPM | SYSTOLIC BLOOD PRESSURE: 151 MMHG | HEART RATE: 63 BPM

## 2025-08-02 DIAGNOSIS — J04.0 REFLUX LARYNGITIS: Primary | ICD-10-CM

## 2025-08-02 DIAGNOSIS — K21.9 REFLUX LARYNGITIS: Primary | ICD-10-CM

## 2025-08-02 PROCEDURE — 99213 OFFICE O/P EST LOW 20 MIN: CPT | Performed by: INTERNAL MEDICINE

## 2025-08-02 PROCEDURE — 3077F SYST BP >= 140 MM HG: CPT | Performed by: INTERNAL MEDICINE

## 2025-08-02 PROCEDURE — 3080F DIAST BP >= 90 MM HG: CPT | Performed by: INTERNAL MEDICINE

## 2025-08-02 RX ORDER — OMEPRAZOLE/SODIUM BICARBONATE 20MG-1.1G
2 CAPSULE ORAL AT BEDTIME
Qty: 60 CAPSULE | Refills: 3 | Status: SHIPPED | OUTPATIENT
Start: 2025-08-02

## 2025-08-04 ENCOUNTER — THERAPY VISIT (OUTPATIENT)
Dept: PHYSICAL THERAPY | Facility: CLINIC | Age: 70
End: 2025-08-04
Payer: MEDICARE

## 2025-08-04 DIAGNOSIS — F40.298 FEAR OF FALLING: ICD-10-CM

## 2025-08-04 DIAGNOSIS — R26.89 BALANCE PROBLEMS: Primary | ICD-10-CM

## 2025-08-06 ENCOUNTER — OFFICE VISIT (OUTPATIENT)
Dept: VASCULAR SURGERY | Facility: CLINIC | Age: 70
End: 2025-08-06
Attending: PSYCHIATRY & NEUROLOGY
Payer: MEDICARE

## 2025-08-06 VITALS — HEART RATE: 63 BPM | SYSTOLIC BLOOD PRESSURE: 151 MMHG | OXYGEN SATURATION: 98 % | DIASTOLIC BLOOD PRESSURE: 82 MMHG

## 2025-08-06 DIAGNOSIS — I65.22 STENOSIS OF LEFT CAROTID ARTERY: ICD-10-CM

## 2025-08-06 PROCEDURE — G2211 COMPLEX E/M VISIT ADD ON: HCPCS | Performed by: SURGERY

## 2025-08-06 PROCEDURE — 1126F AMNT PAIN NOTED NONE PRSNT: CPT | Performed by: SURGERY

## 2025-08-06 PROCEDURE — 3079F DIAST BP 80-89 MM HG: CPT | Performed by: SURGERY

## 2025-08-06 PROCEDURE — 3077F SYST BP >= 140 MM HG: CPT | Performed by: SURGERY

## 2025-08-06 PROCEDURE — 99204 OFFICE O/P NEW MOD 45 MIN: CPT | Performed by: SURGERY

## 2025-08-06 ASSESSMENT — PAIN SCALES - GENERAL: PAINLEVEL_OUTOF10: NO PAIN (0)

## 2025-08-07 ENCOUNTER — LAB (OUTPATIENT)
Dept: LAB | Facility: CLINIC | Age: 70
End: 2025-08-07
Payer: MEDICARE

## 2025-08-07 DIAGNOSIS — I65.22 STENOSIS OF LEFT CAROTID ARTERY: ICD-10-CM

## 2025-08-07 DIAGNOSIS — E09.59: ICD-10-CM

## 2025-08-07 LAB
ANION GAP SERPL CALCULATED.3IONS-SCNC: 11 MMOL/L (ref 7–15)
BUN SERPL-MCNC: 16.4 MG/DL (ref 8–23)
CALCIUM SERPL-MCNC: 9.3 MG/DL (ref 8.8–10.4)
CHLORIDE SERPL-SCNC: 105 MMOL/L (ref 98–107)
CHOLEST SERPL-MCNC: 173 MG/DL
CREAT SERPL-MCNC: 0.84 MG/DL (ref 0.51–0.95)
EGFRCR SERPLBLD CKD-EPI 2021: 74 ML/MIN/1.73M2
ERYTHROCYTE [DISTWIDTH] IN BLOOD BY AUTOMATED COUNT: 14.4 % (ref 10–15)
EST. AVERAGE GLUCOSE BLD GHB EST-MCNC: 117 MG/DL
FASTING STATUS PATIENT QL REPORTED: YES
FASTING STATUS PATIENT QL REPORTED: YES
GLUCOSE SERPL-MCNC: 114 MG/DL (ref 70–99)
HBA1C MFR BLD: 5.7 %
HCO3 SERPL-SCNC: 24 MMOL/L (ref 22–29)
HCT VFR BLD AUTO: 41.9 % (ref 35–47)
HDLC SERPL-MCNC: 72 MG/DL
HGB BLD-MCNC: 13.2 G/DL (ref 11.7–15.7)
LDLC SERPL CALC-MCNC: 91 MG/DL
MCH RBC QN AUTO: 31.1 PG (ref 26.5–33)
MCHC RBC AUTO-ENTMCNC: 31.5 G/DL (ref 31.5–36.5)
MCV RBC AUTO: 99 FL (ref 78–100)
NONHDLC SERPL-MCNC: 101 MG/DL
PLATELET # BLD AUTO: 261 10E3/UL (ref 150–450)
POTASSIUM SERPL-SCNC: 4.6 MMOL/L (ref 3.4–5.3)
RBC # BLD AUTO: 4.24 10E6/UL (ref 3.8–5.2)
SODIUM SERPL-SCNC: 140 MMOL/L (ref 135–145)
TRIGL SERPL-MCNC: 48 MG/DL
WBC # BLD AUTO: 5.9 10E3/UL (ref 4–11)

## 2025-08-07 PROCEDURE — 83036 HEMOGLOBIN GLYCOSYLATED A1C: CPT | Performed by: PSYCHIATRY & NEUROLOGY

## 2025-08-07 PROCEDURE — 99000 SPECIMEN HANDLING OFFICE-LAB: CPT | Performed by: PATHOLOGY

## 2025-08-08 ASSESSMENT — ASTHMA QUESTIONNAIRES
QUESTION_5 LAST FOUR WEEKS HOW WOULD YOU RATE YOUR ASTHMA CONTROL: COMPLETELY CONTROLLED
QUESTION_1 LAST FOUR WEEKS HOW MUCH OF THE TIME DID YOUR ASTHMA KEEP YOU FROM GETTING AS MUCH DONE AT WORK, SCHOOL OR AT HOME: NONE OF THE TIME
QUESTION_4 LAST FOUR WEEKS HOW OFTEN HAVE YOU USED YOUR RESCUE INHALER OR NEBULIZER MEDICATION (SUCH AS ALBUTEROL): NOT AT ALL
QUESTION_2 LAST FOUR WEEKS HOW OFTEN HAVE YOU HAD SHORTNESS OF BREATH: NOT AT ALL
QUESTION_3 LAST FOUR WEEKS HOW OFTEN DID YOUR ASTHMA SYMPTOMS (WHEEZING, COUGHING, SHORTNESS OF BREATH, CHEST TIGHTNESS OR PAIN) WAKE YOU UP AT NIGHT OR EARLIER THAN USUAL IN THE MORNING: NOT AT ALL
ACT_TOTALSCORE: 25

## 2025-08-11 ENCOUNTER — THERAPY VISIT (OUTPATIENT)
Dept: PHYSICAL THERAPY | Facility: CLINIC | Age: 70
End: 2025-08-11
Payer: MEDICARE

## 2025-08-11 DIAGNOSIS — R26.89 BALANCE PROBLEMS: Primary | ICD-10-CM

## 2025-08-11 DIAGNOSIS — F40.298 FEAR OF FALLING: ICD-10-CM

## 2025-08-11 DIAGNOSIS — H40.1130 PRIMARY OPEN ANGLE GLAUCOMA OF BOTH EYES, UNSPECIFIED GLAUCOMA STAGE: Primary | ICD-10-CM

## 2025-08-12 ENCOUNTER — OFFICE VISIT (OUTPATIENT)
Dept: INTERNAL MEDICINE | Facility: CLINIC | Age: 70
End: 2025-08-12
Payer: MEDICARE

## 2025-08-12 ENCOUNTER — TELEPHONE (OUTPATIENT)
Dept: NEUROSURGERY | Facility: CLINIC | Age: 70
End: 2025-08-12

## 2025-08-12 ENCOUNTER — ANCILLARY PROCEDURE (OUTPATIENT)
Dept: GENERAL RADIOLOGY | Facility: CLINIC | Age: 70
End: 2025-08-12
Attending: INTERNAL MEDICINE
Payer: MEDICARE

## 2025-08-12 ENCOUNTER — MYC MEDICAL ADVICE (OUTPATIENT)
Dept: NEUROSURGERY | Facility: CLINIC | Age: 70
End: 2025-08-12

## 2025-08-12 VITALS
OXYGEN SATURATION: 99 % | RESPIRATION RATE: 16 BRPM | TEMPERATURE: 98 F | HEART RATE: 66 BPM | DIASTOLIC BLOOD PRESSURE: 74 MMHG | SYSTOLIC BLOOD PRESSURE: 115 MMHG

## 2025-08-12 DIAGNOSIS — J04.0 REFLUX LARYNGITIS: ICD-10-CM

## 2025-08-12 DIAGNOSIS — F40.298 FEAR OF FALLING: ICD-10-CM

## 2025-08-12 DIAGNOSIS — I60.9 SAH (SUBARACHNOID HEMORRHAGE) (H): ICD-10-CM

## 2025-08-12 DIAGNOSIS — R42 DIZZINESS: ICD-10-CM

## 2025-08-12 DIAGNOSIS — M17.11 PRIMARY OSTEOARTHRITIS OF RIGHT KNEE: ICD-10-CM

## 2025-08-12 DIAGNOSIS — G93.89 CEREBRAL VENTRICULOMEGALY: ICD-10-CM

## 2025-08-12 DIAGNOSIS — K21.9 REFLUX LARYNGITIS: ICD-10-CM

## 2025-08-12 DIAGNOSIS — R73.03 PREDIABETES: ICD-10-CM

## 2025-08-12 DIAGNOSIS — R26.89 IMPAIRMENT OF BALANCE: Primary | ICD-10-CM

## 2025-08-12 DIAGNOSIS — F31.31 BIPOLAR AFFECTIVE DISORDER, CURRENTLY DEPRESSED, MILD (H): ICD-10-CM

## 2025-08-12 PROCEDURE — 73562 X-RAY EXAM OF KNEE 3: CPT | Mod: RT | Performed by: RADIOLOGY

## 2025-08-12 RX ORDER — ATORVASTATIN CALCIUM 20 MG/1
20 TABLET, FILM COATED ORAL DAILY
Qty: 90 TABLET | Refills: 0 | Status: SHIPPED | OUTPATIENT
Start: 2025-08-12

## 2025-08-12 RX ORDER — LIDOCAINE 50 MG/G
1 PATCH TOPICAL EVERY 24 HOURS
Qty: 30 PATCH | Refills: 0 | Status: SHIPPED | OUTPATIENT
Start: 2025-08-12

## 2025-08-12 RX ORDER — MELOXICAM 7.5 MG/1
7.5 TABLET ORAL DAILY
Qty: 30 TABLET | Refills: 0 | Status: SHIPPED | OUTPATIENT
Start: 2025-08-12

## 2025-08-12 RX ORDER — OMEPRAZOLE/SODIUM BICARBONATE 20MG-1.1G
2 CAPSULE ORAL AT BEDTIME
Qty: 60 CAPSULE | Refills: 3 | Status: SHIPPED | OUTPATIENT
Start: 2025-08-12

## 2025-08-12 ASSESSMENT — PAIN SCALES - GENERAL: PAINLEVEL_OUTOF10: SEVERE PAIN (7)

## 2025-08-13 ENCOUNTER — PATIENT OUTREACH (OUTPATIENT)
Dept: CARE COORDINATION | Facility: CLINIC | Age: 70
End: 2025-08-13
Payer: MEDICARE

## 2025-08-13 ENCOUNTER — OFFICE VISIT (OUTPATIENT)
Dept: OPHTHALMOLOGY | Facility: CLINIC | Age: 70
End: 2025-08-13
Payer: MEDICARE

## 2025-08-13 ENCOUNTER — TELEPHONE (OUTPATIENT)
Dept: NEUROSURGERY | Facility: CLINIC | Age: 70
End: 2025-08-13
Payer: MEDICARE

## 2025-08-13 DIAGNOSIS — H35.341 LAMELLAR MACULAR HOLE OF RIGHT EYE: ICD-10-CM

## 2025-08-13 DIAGNOSIS — H04.123 DRY EYE SYNDROME OF BOTH EYES: ICD-10-CM

## 2025-08-13 DIAGNOSIS — H35.371 EPIRETINAL MEMBRANE (ERM) OF RIGHT EYE: Primary | ICD-10-CM

## 2025-08-13 DIAGNOSIS — H52.13 HIGH MYOPIA, BILATERAL: ICD-10-CM

## 2025-08-13 DIAGNOSIS — Z96.1 PSEUDOPHAKIA: ICD-10-CM

## 2025-08-13 DIAGNOSIS — H40.1134 PRIMARY OPEN ANGLE GLAUCOMA (POAG) OF BOTH EYES, INDETERMINATE STAGE: ICD-10-CM

## 2025-08-13 PROCEDURE — G0463 HOSPITAL OUTPT CLINIC VISIT: HCPCS | Performed by: OPHTHALMOLOGY

## 2025-08-13 PROCEDURE — 92133 CPTRZD OPH DX IMG PST SGM ON: CPT | Performed by: OPHTHALMOLOGY

## 2025-08-13 PROCEDURE — 92083 EXTENDED VISUAL FIELD XM: CPT | Performed by: OPHTHALMOLOGY

## 2025-08-13 RX ORDER — DORZOLAMIDE HYDROCHLORIDE AND TIMOLOL MALEATE 20; 5 MG/ML; MG/ML
1 SOLUTION/ DROPS OPHTHALMIC 2 TIMES DAILY
Qty: 10 ML | Refills: 11 | Status: SHIPPED | OUTPATIENT
Start: 2025-08-13

## 2025-08-13 RX ORDER — BRIMONIDINE TARTRATE 2 MG/ML
1 SOLUTION/ DROPS OPHTHALMIC 2 TIMES DAILY
Qty: 10 ML | Refills: 11 | Status: SHIPPED | OUTPATIENT
Start: 2025-08-13

## 2025-08-13 ASSESSMENT — REFRACTION_WEARINGRX
OS_ADD: +2.50
OS_AXIS: 150
SPECS_TYPE: PAL
OD_ADD: +2.50
OD_SPHERE: -4.50
OD_AXIS: 169
OS_CYLINDER: +1.50
OS_SPHERE: -6.25
OD_CYLINDER: +1.00

## 2025-08-13 ASSESSMENT — SLIT LAMP EXAM - LIDS
COMMENTS: MGD
COMMENTS: MGD

## 2025-08-13 ASSESSMENT — EXTERNAL EXAM - RIGHT EYE: OD_EXAM: NORMAL

## 2025-08-13 ASSESSMENT — TONOMETRY
OS_IOP_MMHG: 18
IOP_METHOD: TONOPEN
OD_IOP_MMHG: 15

## 2025-08-13 ASSESSMENT — VISUAL ACUITY
CORRECTION_TYPE: GLASSES
OS_PH_CC: REFUSES
OD_CC: 20/25-
OS_CC: 20/40
METHOD: SNELLEN - LINEAR

## 2025-08-13 ASSESSMENT — EXTERNAL EXAM - LEFT EYE: OS_EXAM: NORMAL

## 2025-08-15 ENCOUNTER — MYC MEDICAL ADVICE (OUTPATIENT)
Dept: INTERNAL MEDICINE | Facility: CLINIC | Age: 70
End: 2025-08-15
Payer: MEDICARE

## 2025-08-15 DIAGNOSIS — R73.03 PREDIABETES: ICD-10-CM

## 2025-08-18 ENCOUNTER — TELEPHONE (OUTPATIENT)
Dept: INTERNAL MEDICINE | Facility: CLINIC | Age: 70
End: 2025-08-18
Payer: MEDICARE

## 2025-08-18 PROBLEM — L72.9 CYST OF SKIN: Status: ACTIVE | Noted: 2025-06-09

## 2025-08-18 PROBLEM — K21.9 GASTROESOPHAGEAL REFLUX DISEASE: Status: ACTIVE | Noted: 2025-06-09

## 2025-08-18 PROBLEM — K11.7 XEROSTOMIA: Status: ACTIVE | Noted: 2025-06-09

## 2025-08-18 PROBLEM — M25.561 PAIN IN RIGHT KNEE: Status: ACTIVE | Noted: 2024-06-03

## 2025-08-18 PROBLEM — Z86.73 HISTORY OF STROKE WITHOUT RESIDUAL DEFICITS: Status: ACTIVE | Noted: 2025-02-27

## 2025-08-18 PROBLEM — H44.23 BILATERAL DEGENERATIVE PROGRESSIVE HIGH MYOPIA: Status: ACTIVE | Noted: 2025-06-09

## 2025-08-18 RX ORDER — ATORVASTATIN CALCIUM 20 MG/1
20 TABLET, FILM COATED ORAL DAILY
Qty: 90 TABLET | Refills: 0 | Status: SHIPPED | OUTPATIENT
Start: 2025-08-18

## 2025-08-20 ENCOUNTER — DOCUMENTATION ONLY (OUTPATIENT)
Dept: OTHER | Facility: CLINIC | Age: 70
End: 2025-08-20
Payer: MEDICARE

## 2025-08-25 ENCOUNTER — APPOINTMENT (OUTPATIENT)
Dept: GENERAL RADIOLOGY | Facility: CLINIC | Age: 70
DRG: 057 | End: 2025-08-25
Payer: MEDICARE

## 2025-08-25 ENCOUNTER — APPOINTMENT (OUTPATIENT)
Dept: PHYSICAL THERAPY | Facility: CLINIC | Age: 70
DRG: 057 | End: 2025-08-25
Attending: NURSE PRACTITIONER
Payer: MEDICARE

## 2025-08-25 ENCOUNTER — HOSPITAL ENCOUNTER (INPATIENT)
Facility: CLINIC | Age: 70
LOS: 3 days | Discharge: HOME | End: 2025-08-28
Attending: NEUROLOGICAL SURGERY | Admitting: NEUROLOGICAL SURGERY
Payer: MEDICARE

## 2025-08-25 PROBLEM — G91.2 NPH (NORMAL PRESSURE HYDROCEPHALUS) (H): Status: ACTIVE | Noted: 2025-08-25

## 2025-08-25 PROCEDURE — 72100 X-RAY EXAM L-S SPINE 2/3 VWS: CPT | Mod: 26 | Performed by: STUDENT IN AN ORGANIZED HEALTH CARE EDUCATION/TRAINING PROGRAM

## 2025-08-25 PROCEDURE — 72100 X-RAY EXAM L-S SPINE 2/3 VWS: CPT

## 2025-08-25 ASSESSMENT — ACTIVITIES OF DAILY LIVING (ADL)
ADLS_ACUITY_SCORE: 35
ADLS_ACUITY_SCORE: 36
ADLS_ACUITY_SCORE: 45
ADLS_ACUITY_SCORE: 45
ADLS_ACUITY_SCORE: 40
ADLS_ACUITY_SCORE: 36
ADLS_ACUITY_SCORE: 45
ADLS_ACUITY_SCORE: 35
ADLS_ACUITY_SCORE: 36
ADLS_ACUITY_SCORE: 35

## 2025-08-26 DIAGNOSIS — K21.9 GASTROESOPHAGEAL REFLUX DISEASE WITHOUT ESOPHAGITIS: Primary | ICD-10-CM

## 2025-08-26 RX ORDER — PANTOPRAZOLE SODIUM 40 MG/1
40 TABLET, DELAYED RELEASE ORAL DAILY
Qty: 30 TABLET | Refills: 0 | Status: SHIPPED | OUTPATIENT
Start: 2025-08-26

## 2025-08-26 RX ORDER — PANTOPRAZOLE SODIUM 40 MG/1
40 TABLET, DELAYED RELEASE ORAL DAILY
Status: ON HOLD | COMMUNITY
End: 2025-08-26

## 2025-08-26 ASSESSMENT — ACTIVITIES OF DAILY LIVING (ADL)
ADLS_ACUITY_SCORE: 48
ADLS_ACUITY_SCORE: 45
ADLS_ACUITY_SCORE: 45
ADLS_ACUITY_SCORE: 48
ADLS_ACUITY_SCORE: 45
ADLS_ACUITY_SCORE: 52
ADLS_ACUITY_SCORE: 45
ADLS_ACUITY_SCORE: 45
ADLS_ACUITY_SCORE: 48
ADLS_ACUITY_SCORE: 45
ADLS_ACUITY_SCORE: 45
ADLS_ACUITY_SCORE: 48
ADLS_ACUITY_SCORE: 45
ADLS_ACUITY_SCORE: 48
ADLS_ACUITY_SCORE: 45
ADLS_ACUITY_SCORE: 45
ADLS_ACUITY_SCORE: 48

## 2025-08-27 ASSESSMENT — ACTIVITIES OF DAILY LIVING (ADL)
ADLS_ACUITY_SCORE: 56
ADLS_ACUITY_SCORE: 52
ADLS_ACUITY_SCORE: 56
ADLS_ACUITY_SCORE: 56
ADLS_ACUITY_SCORE: 52
ADLS_ACUITY_SCORE: 54
ADLS_ACUITY_SCORE: 52
ADLS_ACUITY_SCORE: 56
ADLS_ACUITY_SCORE: 52
ADLS_ACUITY_SCORE: 56
ADLS_ACUITY_SCORE: 52
ADLS_ACUITY_SCORE: 54
ADLS_ACUITY_SCORE: 52
ADLS_ACUITY_SCORE: 56
ADLS_ACUITY_SCORE: 52
ADLS_ACUITY_SCORE: 52
ADLS_ACUITY_SCORE: 56
ADLS_ACUITY_SCORE: 54
ADLS_ACUITY_SCORE: 52
ADLS_ACUITY_SCORE: 56
ADLS_ACUITY_SCORE: 56

## 2025-08-28 ENCOUNTER — APPOINTMENT (OUTPATIENT)
Dept: PHYSICAL THERAPY | Facility: CLINIC | Age: 70
DRG: 057 | End: 2025-08-28
Attending: NEUROLOGICAL SURGERY
Payer: MEDICARE

## 2025-08-28 ENCOUNTER — PATIENT OUTREACH (OUTPATIENT)
Dept: CARE COORDINATION | Facility: CLINIC | Age: 70
End: 2025-08-28
Payer: MEDICARE

## 2025-08-28 ASSESSMENT — ACTIVITIES OF DAILY LIVING (ADL)
ADLS_ACUITY_SCORE: 56
ADLS_ACUITY_SCORE: 56
ADLS_ACUITY_SCORE: 54
ADLS_ACUITY_SCORE: 54
ADLS_ACUITY_SCORE: 56
ADLS_ACUITY_SCORE: 56
ADLS_ACUITY_SCORE: 54
ADLS_ACUITY_SCORE: 56
ADLS_ACUITY_SCORE: 54
ADLS_ACUITY_SCORE: 56

## 2025-09-02 ENCOUNTER — OFFICE VISIT (OUTPATIENT)
Dept: NEUROSURGERY | Facility: CLINIC | Age: 70
End: 2025-09-02
Payer: MEDICARE

## 2025-09-02 ENCOUNTER — PATIENT OUTREACH (OUTPATIENT)
Dept: CARE COORDINATION | Facility: CLINIC | Age: 70
End: 2025-09-02

## 2025-09-02 VITALS — OXYGEN SATURATION: 100 % | SYSTOLIC BLOOD PRESSURE: 137 MMHG | HEART RATE: 79 BPM | DIASTOLIC BLOOD PRESSURE: 84 MMHG

## 2025-09-02 DIAGNOSIS — G91.2 IDIOPATHIC NORMAL PRESSURE HYDROCEPHALUS (INPH) (H): Primary | ICD-10-CM

## 2025-09-02 PROCEDURE — 3079F DIAST BP 80-89 MM HG: CPT | Performed by: NEUROLOGICAL SURGERY

## 2025-09-02 PROCEDURE — 99212 OFFICE O/P EST SF 10 MIN: CPT | Performed by: NEUROLOGICAL SURGERY

## 2025-09-02 PROCEDURE — 3075F SYST BP GE 130 - 139MM HG: CPT | Performed by: NEUROLOGICAL SURGERY

## 2025-09-04 ENCOUNTER — TELEPHONE (OUTPATIENT)
Dept: NEUROSURGERY | Facility: CLINIC | Age: 70
End: 2025-09-04
Payer: MEDICARE

## 2025-09-04 ENCOUNTER — PREP FOR PROCEDURE (OUTPATIENT)
Dept: NEUROSURGERY | Facility: CLINIC | Age: 70
End: 2025-09-04
Payer: MEDICARE